# Patient Record
Sex: FEMALE | Race: WHITE | NOT HISPANIC OR LATINO | Employment: UNEMPLOYED | URBAN - METROPOLITAN AREA
[De-identification: names, ages, dates, MRNs, and addresses within clinical notes are randomized per-mention and may not be internally consistent; named-entity substitution may affect disease eponyms.]

---

## 2018-02-04 ENCOUNTER — APPOINTMENT (OUTPATIENT)
Dept: GENERAL RADIOLOGY | Facility: HOSPITAL | Age: 38
End: 2018-02-04

## 2018-02-04 ENCOUNTER — HOSPITAL ENCOUNTER (INPATIENT)
Facility: HOSPITAL | Age: 38
LOS: 2 days | Discharge: HOME | End: 2018-02-06
Attending: EMERGENCY MEDICINE | Admitting: HOSPITALIST

## 2018-02-04 DIAGNOSIS — T40.1X1A ACCIDENTAL OVERDOSE OF HEROIN, INITIAL ENCOUNTER (HCC): ICD-10-CM

## 2018-02-04 DIAGNOSIS — S82.841A BIMALLEOLAR FRACTURE, RIGHT, CLOSED, INITIAL ENCOUNTER: Primary | ICD-10-CM

## 2018-02-04 DIAGNOSIS — Z74.09 IMPAIRED FUNCTIONAL MOBILITY, BALANCE, AND ENDURANCE: ICD-10-CM
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
## 2018-02-04 PROBLEM — G89.11 PAIN, ACUTE DUE TO TRAUMA: Status: ACTIVE | Noted: 2018-02-04

## 2018-02-04 PROBLEM — F19.10 DRUG ABUSE, IV: Status: ACTIVE | Noted: 2018-02-04

## 2018-02-04 LAB
ANION GAP SERPL CALCULATED.3IONS-SCNC: 12.5 MMOL/L
BACTERIA UR QL AUTO: ABNORMAL /HPF
BASOPHILS # BLD AUTO: 0.03 10*3/MM3 (ref 0–0.2)
BASOPHILS NFR BLD AUTO: 0.4 % (ref 0–1.5)
BILIRUB UR QL STRIP: NEGATIVE
BUN BLD-MCNC: 12 MG/DL (ref 6–20)
BUN/CREAT SERPL: 20.3 (ref 7–25)
CALCIUM SPEC-SCNC: 8.3 MG/DL (ref 8.6–10.5)
CHLORIDE SERPL-SCNC: 103 MMOL/L (ref 98–107)
CLARITY UR: CLEAR
CO2 SERPL-SCNC: 25.5 MMOL/L (ref 22–29)
COLOR UR: YELLOW
CREAT BLD-MCNC: 0.59 MG/DL (ref 0.57–1)
DEPRECATED RDW RBC AUTO: 43.6 FL (ref 37–54)
EOSINOPHIL # BLD AUTO: 0.12 10*3/MM3 (ref 0–0.7)
EOSINOPHIL NFR BLD AUTO: 1.7 % (ref 0.3–6.2)
ERYTHROCYTE [DISTWIDTH] IN BLOOD BY AUTOMATED COUNT: 13.2 % (ref 11.7–13)
GFR SERPL CREATININE-BSD FRML MDRD: 115 ML/MIN/1.73
GLUCOSE BLD-MCNC: 129 MG/DL (ref 65–99)
GLUCOSE UR STRIP-MCNC: NEGATIVE MG/DL
HCT VFR BLD AUTO: 38.7 % (ref 35.6–45.5)
HGB BLD-MCNC: 12.7 G/DL (ref 11.9–15.5)
HGB UR QL STRIP.AUTO: ABNORMAL
HYALINE CASTS UR QL AUTO: ABNORMAL /LPF
IMM GRANULOCYTES # BLD: 0 10*3/MM3 (ref 0–0.03)
IMM GRANULOCYTES NFR BLD: 0 % (ref 0–0.5)
KETONES UR QL STRIP: NEGATIVE
LEUKOCYTE ESTERASE UR QL STRIP.AUTO: NEGATIVE
LYMPHOCYTES # BLD AUTO: 2.2 10*3/MM3 (ref 0.9–4.8)
LYMPHOCYTES NFR BLD AUTO: 31.3 % (ref 19.6–45.3)
MCH RBC QN AUTO: 29.3 PG (ref 26.9–32)
MCHC RBC AUTO-ENTMCNC: 32.8 G/DL (ref 32.4–36.3)
MCV RBC AUTO: 89.4 FL (ref 80.5–98.2)
MONOCYTES # BLD AUTO: 0.46 10*3/MM3 (ref 0.2–1.2)
MONOCYTES NFR BLD AUTO: 6.6 % (ref 5–12)
NEUTROPHILS # BLD AUTO: 4.21 10*3/MM3 (ref 1.9–8.1)
NEUTROPHILS NFR BLD AUTO: 60 % (ref 42.7–76)
NITRITE UR QL STRIP: NEGATIVE
PH UR STRIP.AUTO: 6 [PH] (ref 5–8)
PLATELET # BLD AUTO: 243 10*3/MM3 (ref 140–500)
PMV BLD AUTO: 9.9 FL (ref 6–12)
POTASSIUM BLD-SCNC: 3.7 MMOL/L (ref 3.5–5.2)
PROT UR QL STRIP: NEGATIVE
RBC # BLD AUTO: 4.33 10*6/MM3 (ref 3.9–5.2)
RBC # UR: ABNORMAL /HPF
REF LAB TEST METHOD: ABNORMAL
SODIUM BLD-SCNC: 141 MMOL/L (ref 136–145)
SP GR UR STRIP: 1.02 (ref 1–1.03)
SQUAMOUS #/AREA URNS HPF: ABNORMAL /HPF
UROBILINOGEN UR QL STRIP: ABNORMAL
WBC NRBC COR # BLD: 7.02 10*3/MM3 (ref 4.5–10.7)
WBC UR QL AUTO: ABNORMAL /HPF

## 2018-02-04 PROCEDURE — 99285 EMERGENCY DEPT VISIT HI MDM: CPT

## 2018-02-04 PROCEDURE — 73610 X-RAY EXAM OF ANKLE: CPT

## 2018-02-04 PROCEDURE — 25010000002 ONDANSETRON PER 1 MG: Performed by: NURSE PRACTITIONER

## 2018-02-04 PROCEDURE — 71045 X-RAY EXAM CHEST 1 VIEW: CPT

## 2018-02-04 PROCEDURE — 73600 X-RAY EXAM OF ANKLE: CPT

## 2018-02-04 PROCEDURE — 73590 X-RAY EXAM OF LOWER LEG: CPT

## 2018-02-04 PROCEDURE — 81001 URINALYSIS AUTO W/SCOPE: CPT | Performed by: PHYSICIAN ASSISTANT

## 2018-02-04 PROCEDURE — 25010000002 PROPOFOL 10 MG/ML EMULSION: Performed by: NURSE PRACTITIONER

## 2018-02-04 PROCEDURE — 85025 COMPLETE CBC W/AUTO DIFF WBC: CPT | Performed by: PHYSICIAN ASSISTANT

## 2018-02-04 PROCEDURE — 80048 BASIC METABOLIC PNL TOTAL CA: CPT | Performed by: PHYSICIAN ASSISTANT

## 2018-02-04 RX ORDER — SODIUM CHLORIDE 0.9 % (FLUSH) 0.9 %
10 SYRINGE (ML) INJECTION AS NEEDED
Status: DISCONTINUED | OUTPATIENT
Start: 2018-02-04 | End: 2018-02-06

## 2018-02-04 RX ORDER — DIPHENHYDRAMINE HCL 25 MG
25 CAPSULE ORAL NIGHTLY PRN
Status: DISCONTINUED | OUTPATIENT
Start: 2018-02-04 | End: 2018-02-06 | Stop reason: HOSPADM

## 2018-02-04 RX ORDER — KETOROLAC TROMETHAMINE 30 MG/ML
30 INJECTION, SOLUTION INTRAMUSCULAR; INTRAVENOUS EVERY 6 HOURS PRN
Status: DISCONTINUED | OUTPATIENT
Start: 2018-02-04 | End: 2018-02-06 | Stop reason: HOSPADM

## 2018-02-04 RX ORDER — HYDROCODONE BITARTRATE AND ACETAMINOPHEN 10; 325 MG/1; MG/1
1 TABLET ORAL EVERY 4 HOURS
Status: DISCONTINUED | OUTPATIENT
Start: 2018-02-04 | End: 2018-02-04

## 2018-02-04 RX ORDER — HYDROCODONE BITARTRATE AND ACETAMINOPHEN 10; 325 MG/1; MG/1
1 TABLET ORAL EVERY 4 HOURS
Status: DISCONTINUED | OUTPATIENT
Start: 2018-02-04 | End: 2018-02-05

## 2018-02-04 RX ORDER — SODIUM CHLORIDE 0.9 % (FLUSH) 0.9 %
1-10 SYRINGE (ML) INJECTION AS NEEDED
Status: DISCONTINUED | OUTPATIENT
Start: 2018-02-04 | End: 2018-02-06

## 2018-02-04 RX ORDER — PROPOFOL 10 MG/ML
200 VIAL (ML) INTRAVENOUS ONCE
Status: COMPLETED | OUTPATIENT
Start: 2018-02-04 | End: 2018-02-04

## 2018-02-04 RX ORDER — ACETAMINOPHEN 325 MG/1
650 TABLET ORAL EVERY 4 HOURS PRN
Status: DISCONTINUED | OUTPATIENT
Start: 2018-02-04 | End: 2018-02-06 | Stop reason: HOSPADM

## 2018-02-04 RX ORDER — ONDANSETRON 2 MG/ML
4 INJECTION INTRAMUSCULAR; INTRAVENOUS EVERY 6 HOURS PRN
Status: DISCONTINUED | OUTPATIENT
Start: 2018-02-04 | End: 2018-02-06 | Stop reason: HOSPADM

## 2018-02-04 RX ORDER — ONDANSETRON 2 MG/ML
4 INJECTION INTRAMUSCULAR; INTRAVENOUS ONCE
Status: COMPLETED | OUTPATIENT
Start: 2018-02-04 | End: 2018-02-04

## 2018-02-04 RX ADMIN — PROPOFOL 60 MG: 10 INJECTION, EMULSION INTRAVENOUS at 09:45

## 2018-02-04 RX ADMIN — HYDROCODONE BITARTRATE AND ACETAMINOPHEN 1 TABLET: 10; 325 TABLET ORAL at 23:32

## 2018-02-04 RX ADMIN — HYDROCODONE BITARTRATE AND ACETAMINOPHEN 1 TABLET: 10; 325 TABLET ORAL at 15:30

## 2018-02-04 RX ADMIN — HYDROCODONE BITARTRATE AND ACETAMINOPHEN 1 TABLET: 10; 325 TABLET ORAL at 19:16

## 2018-02-04 RX ADMIN — HYDROMORPHONE HYDROCHLORIDE 1 MG: 10 INJECTION INTRAMUSCULAR; INTRAVENOUS; SUBCUTANEOUS at 07:16

## 2018-02-04 RX ADMIN — ONDANSETRON 4 MG: 2 INJECTION INTRAMUSCULAR; INTRAVENOUS at 07:16

## 2018-02-04 NOTE — H&P
"HISTORY AND PHYSICAL   [facility]        Patient Identification:  Name: Beverley Tovar  Age: 37 y.o.  Sex: female  :  1980  MRN: [id number]                     Primary Care Physician: No Known Provider    Chief Complaint:  Right ankle pain status fall post heroin overdose    History of Present Illness:   Mrs Tovar is a 37-year-old female who formally lived in [facility].  She states she started with abuse of prescription pain pills which then led to OxyContin which she claims is \"government heroin\".  She came to [place] for detox in which she states she was sober for 2 years but has been abusing heroin on a daily basis for the last 8 months.  She still states she injects on a daily basis and it's very evident as you can see numerous track marks up and down each upper extremity.  Today she elected to self inject IV heroin and ultimately she gave a strong enough dose that she fell over out of her chair and broke her right ankle.  She was discovered and given Narcan by EMS.  Since coming to the ER she was given IV Dilaudid and right lower extremity was splinted.  Other than her right ankle pain she does not voice any additional complaints and states she had been in her usual health leading up to this day.  She denies any fever chills night sweats chest pain palpitations cough or shortness of breath.  She is already asked to leave the floor with use of a wheelchair and even plans on having additional friends up this evening to watch the Super Bowl.    Past Medical History:  Past Medical History:   Diagnosis Date   • History of alcohol abuse    • History of drug abuse      Past Surgical History:  Past Surgical History:   Procedure Laterality Date   •  SECTION     • EYE SURGERY        Home Meds:  Prescriptions Prior to Admission   Medication Sig Dispense Refill Last Dose   • ibuprofen (ADVIL,MOTRIN) 200 MG tablet Take 200 mg by mouth every 6 (six) hours as needed for mild " "pain (1-3).   2018 at 0300       Allergies:  No Known Allergies  Immunizations:  Immunization History   Administered Date(s) Administered   • Tdap 2016     Social History:   Social History     Social History Narrative     Social History     Social History   • Marital status: Single     Spouse name: N/A   • Number of children: N/A   • Years of education: N/A     Occupational History   • Not on file.     Social History Main Topics   • Smoking status: Heavy Tobacco Smoker     Packs/day: 1.00     Types: Cigarettes   • Smokeless tobacco: Never Used   • Alcohol use No   • Drug use: 7.00 per week     Special: Heroin      Comment: used  yesterday   • Sexual activity: Not Currently     Other Topics Concern   • Not on file     Social History Narrative       Family History:  History reviewed. No pertinent family history.     Review of Systems  See history of present illness and past medical history.  Patient denies any acute changes to vision smell taste or sound headache dizziness.  Denies any focal changes to vision smell taste or sound.  Denies any nausea vomiting chest pain palpitations cough or shortness of breath denies abdominal pain dysuria bruising bleeding or focal loss of function.  Denies any redness or swelling around her injection sites.  Admits to right ankle pain.  Remainder of ROS is negative.    Objective:  tMax 24 hrs: Temp (24hrs), Av.2 °F (36.8 °C), Min:97.7 °F (36.5 °C), Max:98.7 °F (37.1 °C)    Vitals Ranges:   Temp:  [97.7 °F (36.5 °C)-98.7 °F (37.1 °C)] 98.7 °F (37.1 °C)  Heart Rate:  [] 80  Resp:  [14-18] 18  BP: (105-141)/(44-95) 121/80      Exam:  /80 (BP Location: Right arm)  Pulse 80  Temp 98.7 °F (37.1 °C) (Oral)   Resp 18  Ht 165.1 cm (65\")  Wt 113 kg (250 lb)  LMP 2018  SpO2 97%  Breastfeeding? No  BMI 41.6 kg/m2    General Appearance:    Alert, cooperative, no distress, AOx3, pleasant    Head:    Normocephalic, without obvious abnormality, atraumatic "   Eyes:    PERRL, conjunctiva/corneas clear, EOM's intact, both eyes   Ears:    Normal external ear canals, both ears   Nose:   Nares normal, septum midline, mucosa normal, no drainage    or sinus tenderness   Throat:   Lips, mucosa, and tongue normal. Poor dentition    Neck:   Supple, no JVD       Lungs:     Clear to auscultation bilaterally, respirations unlabored        Heart:    Regular rate and rhythm, S1 and S2 normal, no murmur, rub   or gallop   Abdomen:     Soft, non-tender, bowel sounds active all four quadrants,     no masses, no hepatomegaly, no splenomegaly   Extremities:   RLE in ACE/splint - NVM intact in toes, no cyanosis or edema       Skin:   Multiple track marks on both UE w/out cellulitis        Neurologic:   CNII-XII intact, normal strength      .    Data Review:  Labs in chart were reviewed.             Imaging Results (all)     Procedure Component Value Units Date/Time    XR Tibia Fibula 2 View Right [[id number]] Collected:  02/04/18 0842     Updated:  02/04/18 0847    Narrative:       XR ANKLE 3+ VW RIGHT-, XR TIBIA FIBULA 2 VW RIGHT-     INDICATIONS:     Trauma     TECHNIQUE: 3 VIEWS OF THE RIGHT ANKLE, 3 VIEWS OF THE RIGHT LOWER LEG     COMPARISON: None available     FINDINGS:      Angulated fracture of distal fibular metaphysis is seen with 6 mm  posterior displacement of distal fracture fragment. Medial malleolus  fracture shows about 1.4 cm lateral displacement of distal fracture  fragment. Talus is laterally and posteriorly subluxed/partly dislocated,  with surrounding soft tissue swelling. Small calcaneal spurring is  noted.       Impression:          Fracture dislocation of the ankle.     This report was finalized on 2/4/2018 8:44 AM by Dr. Escobar Alaniz MD.       XR Ankle 3+ View Right [[id number]] Collected:  02/04/18 0842     Updated:  02/04/18 0847    Narrative:       XR ANKLE 3+ VW RIGHT-, XR TIBIA FIBULA 2 VW RIGHT-     INDICATIONS:     Trauma     TECHNIQUE: 3 VIEWS OF THE  RIGHT ANKLE, 3 VIEWS OF THE RIGHT LOWER LEG     COMPARISON: None available     FINDINGS:      Angulated fracture of distal fibular metaphysis is seen with 6 mm  posterior displacement of distal fracture fragment. Medial malleolus  fracture shows about 1.4 cm lateral displacement of distal fracture  fragment. Talus is laterally and posteriorly subluxed/partly dislocated,  with surrounding soft tissue swelling. Small calcaneal spurring is  noted.       Impression:          Fracture dislocation of the ankle.     This report was finalized on 2/4/2018 8:44 AM by Dr. Escobar Alaniz MD.       XR Ankle 2 View Right [[id number]] Collected:  02/04/18 1035     Updated:  02/04/18 1041    Narrative:       XR ANKLE 2 VW RIGHT-     INDICATIONS:     Postreduction evaluation     TECHNIQUE: 2 VIEWS OF THE RIGHT ANKLE     COMPARISON: 2/4/2018 at 0816 hours     FINDINGS:      Distal tibia and fibular fractures are redemonstrated with persistent  displacement of fracture fragments, and lateral subluxation of the  talus. Posterior subluxation of the talus is decreased since the prior  exam. Splint material obscures some bony detail.       Impression:          As described.     This report was finalized on 2/4/2018 10:38 AM by Dr. Escobar Alaniz MD.               Assessment:  Principal Problem:    Bimalleolar fracture, right, closed, initial encounter  Active Problems:    Pain, acute due to trauma    Drug abuse, IV    Overdose of heroin      Plan:  Orthopedics to surgically correct fracture   -Will keep in splint for now with plans for the OR in the a.m.   -Postoperative DVT prophylaxis per orthopedics    IV drug abuse with heroin overdose requiring Narcan just earlier in the day   -I normally would give no narcotics to an IV drug abuser but given this acute fracture I feel she requires a certain element of pain management.  At this juncture she will be place on scheduled Norco until nothing by mouth without any when necessary  medications other than Toradol.  Once nothing by mouth will have IV pain medication available on a every 4 hours prn basis and will immediately convert back to oral regimen post operatively.   -Very high concerned for this patient to abuse while in the hospital.  I very clearly sat down with patient with RN and  present to explain terms of this hospitalization.  If this patient chooses to leave the floor, then I asked that she be discharged and further escorted from the hospital immediately.  I've also instructed that I do not want any unsupervised visits due to concerns for abuse in which she could easily use her current IV site.  We will provide some type of bedside sitter if any of her friends are to show up.  Patient has already asked for a wheelchair to leave the floor and this will not be granted.  Patient agrees to the above aspects and rules on this hospitalization and will see how this plays out clinically.    Hua Merida MD  2/4/2018  2:46 PM

## 2018-02-04 NOTE — ED NOTES
EMS reports they were called for OD. Pt also has deformity to her right ankle     Kannan Dos Santos RN  02/04/18 0103

## 2018-02-04 NOTE — ED PROVIDER NOTES
Pt presents complaining of R ankle pain and swelling secondary to a slip and fall onset PTA. She denies LOC secondary to a fall. Pt denies extremity numbness or tingling, SOA, neck pain, back pain, or any other symptoms at this time.    On exam:  Heart: RRR without murmur  Lungs: CTAB without respiratory distress  Musculoskeletal: Obvious deformity of R ankle with tenderness, she has normal DP pulses. Pt's cap refill is nml.    Procedural Sedation  Date/Time: 2/4/2018 9:44 AM  Performed by: AMELIA MOJICA  Authorized by: AMELIA MOJICA     Consent:     Consent obtained:  Verbal    Consent given by:  Patient  Universal protocol:     Procedure explained and questions answered to patient or proxy's satisfaction: yes      Relevant documents present and verified: yes      Test results available and properly labeled: yes      Imaging studies available: yes      Required blood products, implants, devices, and special equipment available: yes      Site/side marked: yes      Patient identity confirmation method:  Verbally with patient  Indications:     Procedure performed:  Dislocation reduction    Procedure necessitating sedation performed by:  Physician performing sedation    Intended level of sedation:  Moderate (conscious sedation)  Pre-sedation assessment:     Time since last food or drink:  PO intake about eight hours ago    ASA classification: class 1 - normal, healthy patient      Neck mobility: normal      Mouth opening:  3 or more finger widths    Mallampati score:  II - soft palate, uvula, fauces visible    Pre-sedation assessments completed and reviewed: airway patency, cardiovascular function, hydration status, mental status, nausea/vomiting, pain level, respiratory function and temperature      History of difficult intubation: no      Pre-sedation assessment completed:  2/4/2018 9:44 AM  Immediate pre-procedure details:     Reassessment: Patient reassessed immediately prior to procedure      Reviewed:  vital signs      Verified: bag valve mask available, emergency equipment available, intubation equipment available, IV patency confirmed, oxygen available, reversal medications available and suction available    Procedure details (see MAR for exact dosages):     Sedation start time:  2/4/2018 9:45 AM    Preoxygenation:  Nasal cannula    Sedation:  Propofol    Intra-procedure monitoring:  Blood pressure monitoring, cardiac monitor, continuous pulse oximetry and frequent vital sign checks    Sedation end time:  2/4/2018 9:51 AM  Post-procedure details:     Post-sedation assessment completed:  2/4/2018 9:54 AM    Attendance: Constant attendance by certified staff until patient recovered      Recovery: Patient returned to pre-procedure baseline      Post-sedation assessments completed and reviewed: airway patency, cardiovascular function, hydration status, mental status, nausea/vomiting, pain level, respiratory function and temperature      Patient is stable for discharge or admission: yes      Patient tolerance:  Tolerated well, no immediate complications            I reviewed pt's imaging results which show R bimalleolar fracture dislocation. Will admit. Pt understands and agrees with the plan. All questions answered.    I supervised care provided by the midlevel provider.    We have discussed this patient's history, physical exam, and treatment plan.   I have reviewed the note and personally saw and examined the patient and agree with the plan of care. Documentation assistance provided by rossana Naidu for Dr. Enriquez.  Information recorded by the scribe was done at my direction and has been verified and validated by me.     Carmel Naidu  02/04/18 121       Darek Enriquez MD  02/04/18 9486

## 2018-02-04 NOTE — CONSULTS
ORTHOPEDIC SURGERY CONSULT      Patient: Beverley Tovar  Date of Admission: 2018  6:44 AM  YOB: 1980  Medical Record Number: [id number]  Attending Physician: Hua Merida MD  Consulting Physician: Julia Polanco PA-C    CHIEF COMPLIANT: Right ankle pain     HISTORY OF PRESENT ILLINESS: Patient is a 37 y.o. year old female presents to [facility] with above complaints.  I was consulted for further evaluation and treatment. Patient presented to the ER after falling in her home. She had immediate onset of right ankle pain. She was diagnosed in the ER with a bimalleolar ankle fracture. She was admitted to the floor. Patient denies any numbess or tingling. She does admit to history of IV drug abuse.     ALLERGIES: No Known Allergies    HOME MEDICATIONS:  Prescriptions Prior to Admission   Medication Sig Dispense Refill Last Dose   • ibuprofen (ADVIL,MOTRIN) 200 MG tablet Take 200 mg by mouth every 6 (six) hours as needed for mild pain (1-3).   2018 at 0300       CURRENT MEDICATIONS:  Scheduled Meds:   Continuous Infusions:   PRN Meds:.•  Insert peripheral IV **AND** sodium chloride    Past Medical History:   Diagnosis Date   • History of alcohol abuse    • History of drug abuse      Past Surgical History:   Procedure Laterality Date   •  SECTION     • EYE SURGERY       Social History     Occupational History   • Not on file.     Social History Main Topics   • Smoking status: Heavy Tobacco Smoker     Packs/day: 1.00     Types: Cigarettes   • Smokeless tobacco: Never Used   • Alcohol use No   • Drug use: 7.00 per week     Special: Heroin      Comment: used  yesterday   • Sexual activity: Not Currently      Social History     Social History Narrative     History reviewed. No pertinent family history.    REVIEW OF SYSTEMS:    HEENT: Patient denies any headaches, vision changes, change in hearing, or tinnitus, Patient denies epistaxis, sinus pain, hoarseness, or  dysphagia   Pulmonary: Patient denies any cough, congestion, acute change in SOA or wheezing.   Cardiovascular: Patient denies any change in chest pain, dyspnea, palpitations, weakness, intolerance of exercise, varicosities, change in murmur   Gastrointestinal:  Patient denies change in appetite, melena, change in bowel habits.   Genital/Urinary: Patient denies dysuria, change in color of urine, change in frequency of urination, pain with urgency, change in incontinence, retention.   Musculoskeletal: Patient denies complaints of acute changes in symptoms of other joints not mentioned above.   Neurological: Patient denies changes in dizziness, tremor, ataxia, or difficulty in speaking or changes in memory.   Endocrine system: Patient denies acute changes in tremors, palpitations, polyuria, polydipsia, polyphagia, diaphoresis, exophthalmos, or goiter.   Psychological: Patient denies thoughts/plans or harming self or other; denies acute changes in depression,  insomnia, night terrors, laverne, disorientation.   Skin: Patient denies any bruising, rashes, discoloration, pruritus,or wounds not mentioned in history of present illness or chief complaint above.   Hematopoietic: Patient denies current bleeding, epistaxis, hematuria, or melena.    PHYSICAL EXAM:   Vitals:  Vitals:    02/04/18 1214 02/04/18 1229 02/04/18 1245 02/04/18 1245   BP:    121/80   BP Location:    Right arm   Pulse: 73 84 81 80   Resp:       Temp:    98.7 °F (37.1 °C)   TempSrc:    Oral   SpO2: 97% 98% 99% 97%   Weight:       Height:           General:  37 y.o. female who appears about stated age.    Alert, cooperative, in no acute distress         Head:    Normocephalic, without obvious abnormality, atraumatic   Eyes:            Lids and lashes normal, conjunctivae and sclerae normal, no         icterus, no pallor, corneas clear, PERRLA   Ears:    Ears appear intact with no abnormalities noted   Throat:   No oral lesions, no thrush, oral mucosa moist    Neck:   No adenopathy, supple, trachea midline, no JVD   Back:     Limited exam shows no severe kyphosis present,no visible           erythema, no excessive  tenderness to palpation.    Lungs:     Respirations regular, even and unlabored.     Heart:    Normal rate, Pulses palpable   Chest Wall:    No abnormalities observed.   Abdomen:     Normal bowel sounds, no masses, no organomegaly, soft              non-tender, non-distended, no guarding, no rebound                      tenderness   Rectal:     Deferred   Pulses:   Pulses palpable and equal bilaterally   Skin:   No bleeding, bruising or rash   Lymph nodes:   No palpable adenopathy   Extremities:     Right ankle is in a well fitting splint. She is NVID. Negative Homans. Skin is intact. She is able to move all toes. ROM not assessed. Knee nontender to palpation. Pulses 2+    DIAGNOSTIC TEST:  No visits with results within 2 Day(s) from this visit.  Latest known visit with results is:    Admission on 05/04/2016, Discharged on 05/04/2016   Component Date Value Ref Range Status   • Hepatitis B Surface Ag 05/04/2016 Non-Reactive  Non-Reactive Final   • Hep A IgM 05/04/2016 Non-Reactive  Non-Reactive Final   • Hep B C IgM 05/04/2016 Non-Reactive  Non-Reactive Final   • Hepatitis C Ab 05/04/2016 Non-Reactive  Non-Reactive Final   • HIV-1/ HIV-2 05/04/2016 Non-Reactive  Non-Reactive Final   • HIV-1 P24 Ag Screen 05/04/2016 Non-Reactive   Final       Xr Tibia Fibula 2 View Right    Result Date: 2/4/2018  Narrative: XR ANKLE 3+ VW RIGHT-, XR TIBIA FIBULA 2 VW RIGHT-  INDICATIONS:     Trauma  TECHNIQUE: 3 VIEWS OF THE RIGHT ANKLE, 3 VIEWS OF THE RIGHT LOWER LEG  COMPARISON: None available  FINDINGS:   Angulated fracture of distal fibular metaphysis is seen with 6 mm posterior displacement of distal fracture fragment. Medial malleolus fracture shows about 1.4 cm lateral displacement of distal fracture fragment. Talus is laterally and posteriorly subluxed/partly  dislocated, with surrounding soft tissue swelling. Small calcaneal spurring is noted.      Impression:  Fracture dislocation of the ankle.  This report was finalized on 2/4/2018 8:44 AM by Dr. Escobar Alaniz MD.      Xr Ankle 2 View Right    Result Date: 2/4/2018  Narrative: XR ANKLE 2 VW RIGHT-  INDICATIONS:     Postreduction evaluation  TECHNIQUE: 2 VIEWS OF THE RIGHT ANKLE  COMPARISON: 2/4/2018 at 0816 hours  FINDINGS:   Distal tibia and fibular fractures are redemonstrated with persistent displacement of fracture fragments, and lateral subluxation of the talus. Posterior subluxation of the talus is decreased since the prior exam. Splint material obscures some bony detail.      Impression:  As described.  This report was finalized on 2/4/2018 10:38 AM by Dr. Escobar Alaniz MD.      Xr Ankle 3+ View Right    Result Date: 2/4/2018  Narrative: XR ANKLE 3+ VW RIGHT-, XR TIBIA FIBULA 2 VW RIGHT-  INDICATIONS:     Trauma  TECHNIQUE: 3 VIEWS OF THE RIGHT ANKLE, 3 VIEWS OF THE RIGHT LOWER LEG  COMPARISON: None available  FINDINGS:   Angulated fracture of distal fibular metaphysis is seen with 6 mm posterior displacement of distal fracture fragment. Medial malleolus fracture shows about 1.4 cm lateral displacement of distal fracture fragment. Talus is laterally and posteriorly subluxed/partly dislocated, with surrounding soft tissue swelling. Small calcaneal spurring is noted.      Impression:  Fracture dislocation of the ankle.  This report was finalized on 2/4/2018 8:44 AM by Dr. Escobar Alaniz MD.          ASSESSMENT:  Bimalleolar ankle fracture  IV Drug user  Patient Active Problem List   Diagnosis   • Bimalleolar fracture, right, closed, initial encounter       PLAN:    I discussed treatment options with the patient. It was discussed that this will need surgical fixation with ORIF of the right fibula and medial malleolus. Dr Jackson was made aware. We will likely move forward with this tomorrow. NPO after  midnight. Continue with pain control.     Risks and benefits of surgery were discussed with the patient in detail.   Risks include infection, blood clot, fracture, need for additional surgery, anesthesia problems, etc.     The above diagnosis and treatment plan was discussed with the patient.  They were educated in treatment options for their condition.   They were given the opportunity to ask questions and were answered to their satisfaction.  They agreed to proceed with the above treatment plan.        Julia Polanco PA-C  Date: 2/4/2018    As Above.      Hira Jackson MD

## 2018-02-04 NOTE — PLAN OF CARE
Problem: Patient Care Overview (Adult)  Goal: Plan of Care Review  Outcome: Ongoing (interventions implemented as appropriate)   02/04/18 1732   Coping/Psychosocial Response Interventions   Plan Of Care Reviewed With patient   Patient Care Overview   Progress no change   Outcome Evaluation   Outcome Summary/Follow up Plan Pt was admitted from ED today with fracture of left ankle. Pt to have surgery in AM. Consent signed. Pt IV drug user and is on strict scheduled pain medication. Pt is to be supervised while visitors are here per MD. Pt c/o mderate pain. VSS. Will continue to monitor.      Goal: Adult Individualization and Mutuality  Outcome: Ongoing (interventions implemented as appropriate)   02/04/18 1732   Individualization   Patient Specific Preferences Pt prefers to be called Concepcion     Goal: Discharge Needs Assessment  Outcome: Ongoing (interventions implemented as appropriate)   02/04/18 1732   Discharge Needs Assessment   Concerns To Be Addressed basic needs concerns;homelessness   Concerns Comments Pt states that she lives in a hotel.        Problem: Pain, Acute (Adult)  Goal: Identify Related Risk Factors and Signs and Symptoms  Outcome: Ongoing (interventions implemented as appropriate)   02/04/18 1732   Pain, Acute   Related Risk Factors (Acute Pain) persistent pain;procedure/treatment;surgery   Signs and Symptoms (Acute Pain) BADLs/IADLs reluctance/inability to perform;verbalization of pain descriptors     Goal: Acceptable Pain Control/Comfort Level  Outcome: Ongoing (interventions implemented as appropriate)   02/04/18 1732   Pain, Acute (Adult)   Acceptable Pain Control/Comfort Level making progress toward outcome       Problem: SUBSTANCE USE/ABUSE  Goal: By day 5 will complete medical detox and be discharged with an appropriate treatment plan in place.  Outcome: Ongoing (interventions implemented as appropriate)    Goal: By discharge, will develop insight into their chemical dependency and sustain  motivation to continue in recovery.  Outcome: Ongoing (interventions implemented as appropriate)    Goal: By discharge, will have in place an ongoing treatment plan in collaboration with assigned CDP.  Outcome: Ongoing (interventions implemented as appropriate)

## 2018-02-04 NOTE — ED PROVIDER NOTES
EMERGENCY DEPARTMENT ENCOUNTER    CHIEF COMPLAINT  Chief Complaint: ingestion  History given by: patient, EMS  History limited by: patient is amnesic of heroin overdose event  Room Number: 06/06  PMD: No Known Provider      HPI:  Pt is a 37 y.o. female who presents with ingestion. She states that she recreationally uses IV heroin daily. Today, at about 0600, after she injected heroin while sitting at her hotel room kitchen table, she lost consciousness. At some point, she fell out of her chair and injured her right ankle. The pain is exacerbated by RLE movement. EMS reports that once they administered 2mg narcan, pt came to and became responsive. Pt is not sure how or exactly when the fall occurred. She reports that she only remembers awaking with EMS surrounding her. She denies headache, neck pain, back pain, abd pain, chest pain, trouble breathing, sensory loss, any other illicit drug use, ETOH use, and sustaining any other injury. EMS splinted pt's right ankle for immobilization. Past Medical History of drug abuse.      Duration: occurred around 0600 today  Timing: constant  Location: generalized  Radiation: none  Quality: none  Intensity/Severity: moderate  Progression: resolved  Associated Symptoms: loss of consciousness, right ankle pain s/p fall  Aggravating Factors: using IV heroin  Alleviating Factors: administration of narcan by EMS  Previous Episodes: Pt states that she recreationally uses IV heroin daily.   Treatment before arrival: EMS reports that once they administered 2mg narcan, pt came to and became responsive. EMS also splinted pt's right ankle for immobilization.     PAST MEDICAL HISTORY  Active Ambulatory Problems     Diagnosis Date Noted   • No Active Ambulatory Problems     Resolved Ambulatory Problems     Diagnosis Date Noted   • No Resolved Ambulatory Problems     Past Medical History:   Diagnosis Date   • History of alcohol abuse    • History of drug abuse        PAST SURGICAL  HISTORY  Past Surgical History:   Procedure Laterality Date   •  SECTION     • EYE SURGERY         FAMILY HISTORY  History reviewed. No pertinent family history.    SOCIAL HISTORY  Social History     Social History   • Marital status: Single     Spouse name: N/A   • Number of children: N/A   • Years of education: N/A     Occupational History   • Not on file.     Social History Main Topics   • Smoking status: Heavy Tobacco Smoker     Packs/day: 1.00     Types: Cigarettes   • Smokeless tobacco: Not on file   • Alcohol use No   • Drug use: Yes      Comment: heroin, sober x 1 year    • Sexual activity: Not on file     Other Topics Concern   • Not on file     Social History Narrative         ALLERGIES  Review of patient's allergies indicates no known allergies.    REVIEW OF SYSTEMS  Review of Systems   Constitutional: Negative for chills and fever.   HENT: Negative for sore throat.    Respiratory: Negative for cough and shortness of breath.    Cardiovascular: Negative for chest pain.   Gastrointestinal: Negative for abdominal pain, diarrhea, nausea and vomiting.   Genitourinary: Negative for difficulty urinating and dysuria.   Musculoskeletal: Negative for back pain and neck pain.        Right ankle pain   Skin: Negative for rash.   Neurological: Positive for syncope (loss of consciousness during heroin ingestion). Negative for dizziness, numbness and headaches.   Psychiatric/Behavioral: The patient is not nervous/anxious.        PHYSICAL EXAM  ED Triage Vitals   Temp Heart Rate Resp BP SpO2   18 0641 18 0641 18 0641 18 0641 18 0641   97.7 °F (36.5 °C) 104 18 141/95 98 % WNL       Physical Exam   Constitutional: She is oriented to person, place, and time and well-developed, well-nourished, and in no distress.   HENT:   Head: Normocephalic and atraumatic.   Mouth/Throat: Mucous membranes are normal.   Eyes: EOM are normal. No scleral icterus.   Neck: Normal range of motion.   No  c-spine tenderness   Cardiovascular: Regular rhythm and normal heart sounds.  Tachycardia present.    Pulses:       Dorsalis pedis pulses are 2+ on the right side, and 2+ on the left side.        Posterior tibial pulses are 2+ on the right side, and 2+ on the left side.   Pulmonary/Chest: Effort normal and breath sounds normal. No respiratory distress.   Abdominal: Soft. There is no tenderness. There is no rebound and no guarding.   Musculoskeletal: She exhibits deformity (deformity to right ankle).   Tenderness to right ankle, no tenderness to right foot or right knee, able to move right toes, pelvis stable, no t-spine or l-spine tenderness, NV intact distally to all extremities    Neurological: She is alert and oriented to person, place, and time. She has normal motor skills and normal sensation.   Sensation intact distally to RLE, follows commands, answers questions appropriately   Skin: Skin is warm and dry.   Track marks and bruising to BUE   Psychiatric: Mood and affect normal.   Nursing note and vitals reviewed.        RADIOLOGY           XR Tibia Fibula 2 View Right (Final result) (pre reduction) Result time: 02/04/18 08:44:31     Final result by Escobar Alaniz MD (02/04/18 08:44:31)     Impression:        Fracture dislocation of the ankle.     This report was finalized on 2/4/2018 8:44 AM by Dr. Escobar Alaniz MD.        Narrative:     XR ANKLE 3+ VW RIGHT-, XR TIBIA FIBULA 2 VW RIGHT-     INDICATIONS:     Trauma     TECHNIQUE: 3 VIEWS OF THE RIGHT ANKLE, 3 VIEWS OF THE RIGHT LOWER LEG     COMPARISON: None available     FINDINGS:      Angulated fracture of distal fibular metaphysis is seen with 6 mm  posterior displacement of distal fracture fragment. Medial malleolus  fracture shows about 1.4 cm lateral displacement of distal fracture  fragment. Talus is laterally and posteriorly subluxed/partly dislocated,  with surrounding soft tissue swelling. Small calcaneal spurring is  noted.            XR  Ankle 2 View Right (Final result) (post reduction) Result time: 02/04/18 10:38:16     Procedure changed from XR Ankle 3+ View Right          Final result by Escobar Alaniz MD (02/04/18 10:38:16)     Impression:        As described.     This report was finalized on 2/4/2018 10:38 AM by Dr. Escobar Alaniz MD.        Narrative:     XR ANKLE 2 VW RIGHT-     INDICATIONS:     Postreduction evaluation     TECHNIQUE: 2 VIEWS OF THE RIGHT ANKLE     COMPARISON: 2/4/2018 at 0816 hours     FINDINGS:      Distal tibia and fibular fractures are redemonstrated with persistent  displacement of fracture fragments, and lateral subluxation of the  talus. Posterior subluxation of the talus is decreased since the prior  exam. Splint material obscures some bony detail.       I ordered the above noted radiological studies and reviewed the images on the PACS system.       PROCEDURES    Lower Extremity Dislocation  Date/Time: 2/4/2018 9:45 AM  Performed by: BEN GUEVARA  Authorized by: AMELIA MOJICA   Consent: Verbal consent obtained. Written consent obtained.  Risks and benefits: risks, benefits and alternatives were discussed  Consent given by: patient  Patient understanding: patient states understanding of the procedure being performed  Patient consent: the patient's understanding of the procedure matches consent given  Imaging studies: imaging studies available  Required items: required blood products, implants, devices, and special equipment available  Patient identity confirmed: verbally with patient and arm band  Injury location: ankle  Location details: right ankle  Injury type: fracture-dislocation  Fracture type: bimalleolar  Pre-procedure neurovascular assessment: neurovascularly intact  Pre-procedure distal perfusion: normal  Pre-procedure neurological function: normal  Pre-procedure range of motion: reduced    Anesthesia:  Local anesthesia used: no    Sedation:  Patient sedated: yes  Sedation type:  moderate (conscious) sedation  Sedatives: propofol and see MAR for details (60mg propofol)  Analgesia: see MAR for details and hydromorphone  Sedation start date/time: 2/4/2018 9:45 AM  Sedation end date/time: 2/4/2018 9:50 AM  Vitals: Vital signs were monitored during sedation.  Manipulation performed: yes  Reduction successful: yes  X-ray confirmed reduction: yes (minimal improvement of alignment on xray)  Immobilization: splint  Splint type: ankle stirrup (posterior)  Supplies used: Ortho-Glass  Post-procedure neurovascular assessment: post-procedure neurovascularly intact  Post-procedure distal perfusion: normal  Post-procedure neurological function: normal  Post-procedure range of motion: improved  Patient tolerance: Patient tolerated the procedure well with no immediate complications  Comments:   9:45 AM- Dr Enriquez administered 60mg propofol for conscious sedation prior to reduction and splint application of right bimalleolar fracture dislocation.      9:46 AM- Reduced and splinted right bimalleolar fracture dislocation.               PROGRESS AND CONSULTS  7:03 AM- Ordered dilaudid and zofran for pain.   7:43 AM- Reviewed pt's history and workup with Dr. Enriquez.  At bedside evaluation, they agree with the plan of care.  8:50 AM- Obtained right ankle xray and right tib/fib xray results-> shows right bimalleolar fracture dislocation.   9:30 AM- Rechecked pt. She is resting comfortably and is in no acute distress. Discussed with pt about right ankle xray and right tib/fib xray findings (right bimalleolar fracture dislocation). Informed pt of plan to reduce and splint her right bimalleolar fracture dislocation under conscious sedation for support and stabilization. Also notified her that we will consult with on call orthopedic surgeon regarding further management. Educated pt that her right bimallolar fracture dislocation could possibly require surgical intervention. Pt verbalizes understanding and agreement.    9:45 AM- Performed reduction and splint application of right bimalleolar fracture dislocation under conscious sedation. See Dr Enriquez's note regarding details of conscious sedation. See above note regarding details of reduction and splint application. Will obtain post reduction right ankle xray.   10:15 AM- Sent call out to on call orthopedic surgeon for consult.   10:35 AM- Discussed case with Dr Jackosn (orthopedic surgeon)  Reviewed history, exam, results and treatments.  Discussed concerns and plan of care. Dr Jackson will consult and would like for pt to be admitted to medicine.  10:37 AM- Sent call out to [facility] for admission.  10:40 AM- Post reduction right ankle xray shows minimal improvement of alignment.   11:09 AM- Discussed case with Dr Merida ([facility] hospitalist)  Reviewed history, exam, results and treatments.  Discussed concerns and plan of care. Dr Merida accepts pt to be admitted to med/surg.  11:11 AM- Rechecked pt. She is resting comfortably and is in no acute distress. Discussed with pt about my conversation with Dr Jackson (orthopedic surgeon) and plan for her to be admitted for further care, possible orthopedic surgical intervention, and observation. Pt verbalizes understanding and agreement with plan.       ADMISSION    Discussed treatment plan and reason for admission with pt/family and admitting physician.  Pt/family voiced understanding of the plan for admission for further testing/treatment as needed.      DIAGNOSIS  Final diagnoses:   Bimalleolar fracture with dislocation, right, closed, initial encounter   Fall, initial encounter   Accidental overdose of heroin, initial encounter           COURSE & MEDICAL DECISION MAKING  Pertinent Imaging studies that were ordered and reviewed are noted above.  Results were reviewed/discussed with the patient and they were also made aware of online assess.   Pt also made aware that some labs, such as cultures, will not be resulted during ER visit and follow  "up with PMD is necessary.     MEDICATIONS GIVEN IN ER  Medications   sodium chloride 0.9 % flush 10 mL (not administered)   HYDROmorphone (DILAUDID) injection 1 mg (1 mg Intravenous Given 2/4/18 0716)   ondansetron (ZOFRAN) injection 4 mg (4 mg Intravenous Given 2/4/18 0716)   Propofol (DIPRIVAN) injection 200 mg (60 mg Intravenous Given 2/4/18 0945)       /44  Pulse 82  Temp 97.7 °F (36.5 °C) (Tympanic)   Resp 18  Ht 165.1 cm (65\")  Wt 113 kg (250 lb)  SpO2 99%  BMI 41.6 kg/m2      I personally reviewed the past medical history, past surgical history, social history, family history, current medications and allergies as they appear in this chart.  The scribe's note accurately reflects the work and decisions made by me.     Documentation assistance provided by rossana Elmore for JC Willis on 2/4/2018 at 11:18 AM. Information recorded by the scribe was done at my direction and has been verified and validated by me.       Franko Elmore  02/04/18 1128       DANDRE Sanchez  02/04/18 1233    Fixed error on ROS.     Macarena Cisse, DANDRE  02/05/18 0736    "

## 2018-02-05 PROCEDURE — 25010000002 KETOROLAC TROMETHAMINE PER 15 MG: Performed by: HOSPITALIST

## 2018-02-05 RX ORDER — BUPROPION HYDROCHLORIDE 150 MG/1
150 TABLET, EXTENDED RELEASE ORAL EVERY 12 HOURS SCHEDULED
Status: DISCONTINUED | OUTPATIENT
Start: 2018-02-05 | End: 2018-02-06 | Stop reason: HOSPADM

## 2018-02-05 RX ORDER — HYDROCODONE BITARTRATE AND ACETAMINOPHEN 10; 325 MG/1; MG/1
1 TABLET ORAL EVERY 4 HOURS PRN
Status: DISCONTINUED | OUTPATIENT
Start: 2018-02-05 | End: 2018-02-06 | Stop reason: HOSPADM

## 2018-02-05 RX ORDER — HYDROMORPHONE HYDROCHLORIDE 1 MG/ML
1 INJECTION, SOLUTION INTRAMUSCULAR; INTRAVENOUS; SUBCUTANEOUS EVERY 4 HOURS PRN
Status: DISCONTINUED | OUTPATIENT
Start: 2018-02-05 | End: 2018-02-06 | Stop reason: HOSPADM

## 2018-02-05 RX ADMIN — HYDROMORPHONE HYDROCHLORIDE 1 MG: 10 INJECTION INTRAMUSCULAR; INTRAVENOUS; SUBCUTANEOUS at 22:45

## 2018-02-05 RX ADMIN — BUPROPION HYDROCHLORIDE 150 MG: 150 TABLET, EXTENDED RELEASE ORAL at 22:44

## 2018-02-05 RX ADMIN — KETOROLAC TROMETHAMINE 30 MG: 30 INJECTION, SOLUTION INTRAMUSCULAR at 19:21

## 2018-02-05 RX ADMIN — HYDROCODONE BITARTRATE AND ACETAMINOPHEN 1 TABLET: 10; 325 TABLET ORAL at 03:35

## 2018-02-05 RX ADMIN — KETOROLAC TROMETHAMINE 30 MG: 30 INJECTION, SOLUTION INTRAMUSCULAR at 08:12

## 2018-02-05 RX ADMIN — HYDROMORPHONE HYDROCHLORIDE 1 MG: 10 INJECTION INTRAMUSCULAR; INTRAVENOUS; SUBCUTANEOUS at 12:09

## 2018-02-05 NOTE — PROGRESS NOTES
DATE OF ADMISSION: 2/4/2018  6:44 AM     LOS: 1 day     Subjective :   C/o. Right ankle pain. Awaiting surgery.    Objective :    Vital signs in last 24 hours:  Vitals:    02/04/18 2030 02/04/18 2330 02/05/18 0300 02/05/18 0731   BP: 133/72 121/67 122/72 114/73   BP Location: Left arm Left arm Left arm Left arm   Patient Position: Lying Lying Lying Lying   Pulse: 90 90 92 87   Resp: 16 16 16 16   Temp: 98.5 °F (36.9 °C) 97.2 °F (36.2 °C) 98 °F (36.7 °C) 98 °F (36.7 °C)   TempSrc: Oral Oral Oral Oral   SpO2: 96% 99% 98% 98%   Weight:       Height:         Body mass index is 41.6 kg/(m^2).    PHYSICAL EXAM:  Patient is calm, in no acute distress, awake and oriented x 3.  Skin on right ankle is clean, dry and intact.  No signs of infection.  Swelling is appropriate in amount.  Homans test is negative.  Patient is neurovascularly intact distally.    LABS:    Results from last 7 days  Lab Units 02/04/18  1614   WBC 10*3/mm3 7.02   HEMOGLOBIN g/dL 12.7   HEMATOCRIT % 38.7   PLATELETS 10*3/mm3 243       Results from last 7 days  Lab Units 02/04/18  1614   SODIUM mmol/L 141   POTASSIUM mmol/L 3.7   CHLORIDE mmol/L 103   CO2 mmol/L 25.5   BUN mg/dL 12   CREATININE mg/dL 0.59   GLUCOSE mg/dL 129*   CALCIUM mg/dL 8.3*           ASSESSMENT:  Right ankle dislocation and fracture     Plan:  Continue pain control. Surgical planning for today or tomorrow am.   Continue splint  Continue SCDs, Continue DVT prophylaxis.  .    Julia Polanco PA-C    Date: 2/5/2018  Time: 8:38 AM    As above.  Surgery scheduled for tomorrow.  NPO after midnight.    Hira Jackson MD

## 2018-02-05 NOTE — PLAN OF CARE
Problem: Orthopaedic Fracture (Adult)  Goal: Signs and Symptoms of Listed Potential Problems Will be Absent or Manageable (Orthopaedic Fracture)  Outcome: Ongoing (interventions implemented as appropriate)      Problem: Patient Care Overview (Adult)  Goal: Plan of Care Review  Outcome: Ongoing (interventions implemented as appropriate)   02/05/18 0253   Coping/Psychosocial Response Interventions   Plan Of Care Reviewed With patient   Patient Care Overview   Progress no change   Outcome Evaluation   Outcome Summary/Follow up Plan patient resting through night, pain controlled at this time, patient educated on medication orders and safety      Goal: Adult Individualization and Mutuality  Outcome: Ongoing (interventions implemented as appropriate)    Goal: Discharge Needs Assessment  Outcome: Ongoing (interventions implemented as appropriate)      Problem: Fall Risk (Adult)  Goal: Identify Related Risk Factors and Signs and Symptoms  Outcome: Outcome(s) achieved Date Met: 02/05/18    Goal: Absence of Falls  Outcome: Ongoing (interventions implemented as appropriate)

## 2018-02-05 NOTE — PAYOR COMM NOTE
"UR CONTACT:  DOT                   P: [phone]  F: [phone]        Beverley Tovar (37 y.o. Female)     Date of Birth Social Security Number Address Home Phone MRN    1980  [place] [address]  [address] [facility] [address] [phone] [id number]    [place] Marital Status          None Single       Admission Date Admission Type Admitting Provider Attending Provider Department, Room/Bed    18 Emergency Hua Merida MD [place], Hua Farley MD [facility] [address] [place], P877/1    Discharge Date Discharge Disposition Discharge Destination                      Attending Provider: Hua Merida MD     Allergies:  No Known Allergies    Isolation:  None   Infection:  None   Code Status:  FULL    Ht:  165.1 cm (65\")   Wt:  113 kg (250 lb)    Admission Cmt:  None   Principal Problem:  Bimalleolar fracture, right, closed, initial encounter [S82.841A]                 Active Insurance as of 2018     Patient has no active insurance coverage on file for 2018.          Emergency Contacts      (Rel.) Home Phone Work Phone Mobile Phone    Tylor De Los Santos (Friend) [phone] -- --               History & Physical      Hua Merida MD at 2018  2:46 PM          HISTORY AND PHYSICAL   [facility]        Patient Identification:  Name: Beverley Tovar  Age: 37 y.o.  Sex: female  :  1980  MRN: [id number]                     Primary Care Physician: No Known Provider    Chief Complaint:  Right ankle pain status fall post heroin overdose    History of Present Illness:   Mrs Tovar is a 37-year-old female who formally lived in [facility].  She states she started with abuse of prescription pain pills which then led to OxyContin which she claims is \"government heroin\".  She came to [place] for detox in which she states she was sober for 2 years but has been abusing heroin on a daily basis for the last 8 months.  " She still states she injects on a daily basis and it's very evident as you can see numerous track marks up and down each upper extremity.  Today she elected to self inject IV heroin and ultimately she gave a strong enough dose that she fell over out of her chair and broke her right ankle.  She was discovered and given Narcan by EMS.  Since coming to the ER she was given IV Dilaudid and right lower extremity was splinted.  Other than her right ankle pain she does not voice any additional complaints and states she had been in her usual health leading up to this day.  She denies any fever chills night sweats chest pain palpitations cough or shortness of breath.  She is already asked to leave the floor with use of a wheelchair and even plans on having additional friends up this evening to watch the Super Bowl.    Past Medical History:  Past Medical History:   Diagnosis Date   • History of alcohol abuse    • History of drug abuse      Past Surgical History:  Past Surgical History:   Procedure Laterality Date   •  SECTION     • EYE SURGERY        Home Meds:  Prescriptions Prior to Admission   Medication Sig Dispense Refill Last Dose   • ibuprofen (ADVIL,MOTRIN) 200 MG tablet Take 200 mg by mouth every 6 (six) hours as needed for mild pain (1-3).   2018 at 0300       Allergies:  No Known Allergies  Immunizations:  Immunization History   Administered Date(s) Administered   • Tdap 2016     Social History:   Social History     Social History Narrative     Social History     Social History   • Marital status: Single     Spouse name: N/A   • Number of children: N/A   • Years of education: N/A     Occupational History   • Not on file.     Social History Main Topics   • Smoking status: Heavy Tobacco Smoker     Packs/day: 1.00     Types: Cigarettes   • Smokeless tobacco: Never Used   • Alcohol use No   • Drug use: 7.00 per week     Special: Heroin      Comment: used  yesterday   • Sexual activity: Not Currently  "    Other Topics Concern   • Not on file     Social History Narrative       Family History:  History reviewed. No pertinent family history.     Review of Systems  See history of present illness and past medical history.  Patient denies any acute changes to vision smell taste or sound headache dizziness.  Denies any focal changes to vision smell taste or sound.  Denies any nausea vomiting chest pain palpitations cough or shortness of breath denies abdominal pain dysuria bruising bleeding or focal loss of function.  Denies any redness or swelling around her injection sites.  Admits to right ankle pain.  Remainder of ROS is negative.    Objective:  tMax 24 hrs: Temp (24hrs), Av.2 °F (36.8 °C), Min:97.7 °F (36.5 °C), Max:98.7 °F (37.1 °C)    Vitals Ranges:   Temp:  [97.7 °F (36.5 °C)-98.7 °F (37.1 °C)] 98.7 °F (37.1 °C)  Heart Rate:  [] 80  Resp:  [14-18] 18  BP: (105-141)/(44-95) 121/80      Exam:  /80 (BP Location: Right arm)  Pulse 80  Temp 98.7 °F (37.1 °C) (Oral)   Resp 18  Ht 165.1 cm (65\")  Wt 113 kg (250 lb)  LMP 2018  SpO2 97%  Breastfeeding? No  BMI 41.6 kg/m2    General Appearance:    Alert, cooperative, no distress, AOx3, pleasant    Head:    Normocephalic, without obvious abnormality, atraumatic   Eyes:    PERRL, conjunctiva/corneas clear, EOM's intact, both eyes   Ears:    Normal external ear canals, both ears   Nose:   Nares normal, septum midline, mucosa normal, no drainage    or sinus tenderness   Throat:   Lips, mucosa, and tongue normal. Poor dentition    Neck:   Supple, no JVD       Lungs:     Clear to auscultation bilaterally, respirations unlabored        Heart:    Regular rate and rhythm, S1 and S2 normal, no murmur, rub   or gallop   Abdomen:     Soft, non-tender, bowel sounds active all four quadrants,     no masses, no hepatomegaly, no splenomegaly   Extremities:   RLE in ACE/splint - NVM intact in toes, no cyanosis or edema       Skin:   Multiple track marks on " both UE w/out cellulitis        Neurologic:   CNII-XII intact, normal strength      .    Data Review:  Labs in chart were reviewed.             Imaging Results (all)     Procedure Component Value Units Date/Time    XR Tibia Fibula 2 View Right [[id number]] Collected:  02/04/18 0842     Updated:  02/04/18 0847    Narrative:       XR ANKLE 3+ VW RIGHT-, XR TIBIA FIBULA 2 VW RIGHT-     INDICATIONS:     Trauma     TECHNIQUE: 3 VIEWS OF THE RIGHT ANKLE, 3 VIEWS OF THE RIGHT LOWER LEG     COMPARISON: None available     FINDINGS:      Angulated fracture of distal fibular metaphysis is seen with 6 mm  posterior displacement of distal fracture fragment. Medial malleolus  fracture shows about 1.4 cm lateral displacement of distal fracture  fragment. Talus is laterally and posteriorly subluxed/partly dislocated,  with surrounding soft tissue swelling. Small calcaneal spurring is  noted.       Impression:          Fracture dislocation of the ankle.     This report was finalized on 2/4/2018 8:44 AM by Dr. Escobar Alaniz MD.       XR Ankle 3+ View Right [[id number]] Collected:  02/04/18 0842     Updated:  02/04/18 0847    Narrative:       XR ANKLE 3+ VW RIGHT-, XR TIBIA FIBULA 2 VW RIGHT-     INDICATIONS:     Trauma     TECHNIQUE: 3 VIEWS OF THE RIGHT ANKLE, 3 VIEWS OF THE RIGHT LOWER LEG     COMPARISON: None available     FINDINGS:      Angulated fracture of distal fibular metaphysis is seen with 6 mm  posterior displacement of distal fracture fragment. Medial malleolus  fracture shows about 1.4 cm lateral displacement of distal fracture  fragment. Talus is laterally and posteriorly subluxed/partly dislocated,  with surrounding soft tissue swelling. Small calcaneal spurring is  noted.       Impression:          Fracture dislocation of the ankle.     This report was finalized on 2/4/2018 8:44 AM by Dr. Escobar Alaniz MD.       XR Ankle 2 View Right [[id number]] Collected:  02/04/18 1035     Updated:  02/04/18 1041     Narrative:       XR ANKLE 2 VW RIGHT-     INDICATIONS:     Postreduction evaluation     TECHNIQUE: 2 VIEWS OF THE RIGHT ANKLE     COMPARISON: 2/4/2018 at 0816 hours     FINDINGS:      Distal tibia and fibular fractures are redemonstrated with persistent  displacement of fracture fragments, and lateral subluxation of the  talus. Posterior subluxation of the talus is decreased since the prior  exam. Splint material obscures some bony detail.       Impression:          As described.     This report was finalized on 2/4/2018 10:38 AM by Dr. Escobar Alaniz MD.               Assessment:  Principal Problem:    Bimalleolar fracture, right, closed, initial encounter  Active Problems:    Pain, acute due to trauma    Drug abuse, IV    Overdose of heroin      Plan:  Orthopedics to surgically correct fracture   -Will keep in splint for now with plans for the OR in the a.m.   -Postoperative DVT prophylaxis per orthopedics    IV drug abuse with heroin overdose requiring Narcan just earlier in the day   -I normally would give no narcotics to an IV drug abuser but given this acute fracture I feel she requires a certain element of pain management.  At this juncture she will be place on scheduled Norco until nothing by mouth without any when necessary medications other than Toradol.  Once nothing by mouth will have IV pain medication available on a every 4 hours prn basis and will immediately convert back to oral regimen post operatively.   -Very high concerned for this patient to abuse while in the hospital.  I very clearly sat down with patient with RN and  present to explain terms of this hospitalization.  If this patient chooses to leave the floor, then I asked that she be discharged and further escorted from the hospital immediately.  I've also instructed that I do not want any unsupervised visits due to concerns for abuse in which she could easily use her current IV site.  We will provide some type of bedside  sitter if any of her friends are to show up.  Patient has already asked for a wheelchair to leave the floor and this will not be granted.  Patient agrees to the above aspects and rules on this hospitalization and will see how this plays out clinically.    Hua Merida MD  2/4/2018  2:46 PM     Electronically signed by Hua Merida MD at 2/4/2018  3:12 PM           Emergency Department Notes      Kannan Dos Santos RN at 2/4/2018  6:39 AM          EMS reports they were called for OD. Pt also has deformity to her right ankle     Kannan Dos Santos RN  02/04/18 0640       Electronically signed by Kannan Dos Santos RN at 2/4/2018  6:40 AM      DANDRE Sanchez at 2/4/2018  6:49 AM      Procedure Orders:    1. Orthopedic Injury Treatment [[id number]] ordered by DANDRE Sanchez at 02/04/18 0946           Attestation signed by Darek Enriquez MD at 2/5/2018  8:08 AM        I supervised care provided by the midlevel provider.    We have discussed this patient's history, physical exam, and treatment plan.   I have reviewed the note and personally saw and examined the patient and agree with the plan of care.  See attached attending note.          For this patient encounter, I reviewed the NP or PA documentation, treatment plan, and medical decision making. Darek Enriquez MD 2/5/2018 8:08 AM                                 EMERGENCY DEPARTMENT ENCOUNTER    CHIEF COMPLAINT  Chief Complaint: ingestion  History given by: patient, EMS  History limited by: patient is amnesic of heroin overdose event  Room Number: 06/06  PMD: No Known Provider      HPI:  Pt is a 37 y.o. female who presents with ingestion. She states that she recreationally uses IV heroin daily. Today, at about 0600, after she injected heroin while sitting at her hotel room kitchen table, she lost consciousness. At some point, she fell out of her chair and injured her right ankle. The pain is  exacerbated by RLE movement. EMS reports that once they administered 2mg narcan, pt came to and became responsive. Pt is not sure how or exactly when the fall occurred. She reports that she only remembers awaking with EMS surrounding her. She denies headache, neck pain, back pain, abd pain, chest pain, trouble breathing, sensory loss, any other illicit drug use, ETOH use, and sustaining any other injury. EMS splinted pt's right ankle for immobilization. Past Medical History of drug abuse.      Duration: occurred around 0600 today  Timing: constant  Location: generalized  Radiation: none  Quality: none  Intensity/Severity: moderate  Progression: resolved  Associated Symptoms: loss of consciousness, right ankle pain s/p fall  Aggravating Factors: using IV heroin  Alleviating Factors: administration of narcan by EMS  Previous Episodes: Pt states that she recreationally uses IV heroin daily.   Treatment before arrival: EMS reports that once they administered 2mg narcan, pt came to and became responsive. EMS also splinted pt's right ankle for immobilization.     PAST MEDICAL HISTORY  Active Ambulatory Problems     Diagnosis Date Noted   • No Active Ambulatory Problems     Resolved Ambulatory Problems     Diagnosis Date Noted   • No Resolved Ambulatory Problems     Past Medical History:   Diagnosis Date   • History of alcohol abuse    • History of drug abuse        PAST SURGICAL HISTORY  Past Surgical History:   Procedure Laterality Date   •  SECTION     • EYE SURGERY         FAMILY HISTORY  History reviewed. No pertinent family history.    SOCIAL HISTORY  Social History     Social History   • Marital status: Single     Spouse name: N/A   • Number of children: N/A   • Years of education: N/A     Occupational History   • Not on file.     Social History Main Topics   • Smoking status: Heavy Tobacco Smoker     Packs/day: 1.00     Types: Cigarettes   • Smokeless tobacco: Not on file   • Alcohol use No   • Drug use:  Yes      Comment: heroin, sober x 1 year    • Sexual activity: Not on file     Other Topics Concern   • Not on file     Social History Narrative         ALLERGIES  Review of patient's allergies indicates no known allergies.    REVIEW OF SYSTEMS  Review of Systems   Constitutional: Negative for chills and fever.   HENT: Negative for sore throat.    Respiratory: Negative for cough and shortness of breath.    Cardiovascular: Negative for chest pain.   Gastrointestinal: Negative for abdominal pain, diarrhea, nausea and vomiting.   Genitourinary: Negative for difficulty urinating and dysuria.   Musculoskeletal: Negative for back pain and neck pain.        Right ankle pain   Skin: Negative for rash.   Neurological: Positive for syncope (loss of consciousness during heroin ingestion). Negative for dizziness, numbness and headaches.   Psychiatric/Behavioral: The patient is not nervous/anxious.        PHYSICAL EXAM  ED Triage Vitals   Temp Heart Rate Resp BP SpO2   02/04/18 0641 02/04/18 0641 02/04/18 0641 02/04/18 0641 02/04/18 0641   97.7 °F (36.5 °C) 104 18 141/95 98 % WNL       Physical Exam   Constitutional: She is oriented to person, place, and time and well-developed, well-nourished, and in no distress.   HENT:   Head: Normocephalic and atraumatic.   Mouth/Throat: Mucous membranes are normal.   Eyes: EOM are normal. No scleral icterus.   Neck: Normal range of motion.   No c-spine tenderness   Cardiovascular: Regular rhythm and normal heart sounds.  Tachycardia present.    Pulses:       Dorsalis pedis pulses are 2+ on the right side, and 2+ on the left side.        Posterior tibial pulses are 2+ on the right side, and 2+ on the left side.   Pulmonary/Chest: Effort normal and breath sounds normal. No respiratory distress.   Abdominal: Soft. There is no tenderness. There is no rebound and no guarding.   Musculoskeletal: She exhibits deformity (deformity to right ankle).   Tenderness to right ankle, no tenderness to right  foot or right knee, able to move right toes, pelvis stable, no t-spine or l-spine tenderness, NV intact distally to all extremities    Neurological: She is alert and oriented to person, place, and time. She has normal motor skills and normal sensation.   Sensation intact distally to RLE, follows commands, answers questions appropriately   Skin: Skin is warm and dry.   Track marks and bruising to BUE   Psychiatric: Mood and affect normal.   Nursing note and vitals reviewed.        RADIOLOGY           XR Tibia Fibula 2 View Right (Final result) (pre reduction) Result time: 02/04/18 08:44:31     Final result by Escobar Alaniz MD (02/04/18 08:44:31)     Impression:        Fracture dislocation of the ankle.     This report was finalized on 2/4/2018 8:44 AM by Dr. Escobar Alaniz MD.        Narrative:     XR ANKLE 3+ VW RIGHT-, XR TIBIA FIBULA 2 VW RIGHT-     INDICATIONS:     Trauma     TECHNIQUE: 3 VIEWS OF THE RIGHT ANKLE, 3 VIEWS OF THE RIGHT LOWER LEG     COMPARISON: None available     FINDINGS:      Angulated fracture of distal fibular metaphysis is seen with 6 mm  posterior displacement of distal fracture fragment. Medial malleolus  fracture shows about 1.4 cm lateral displacement of distal fracture  fragment. Talus is laterally and posteriorly subluxed/partly dislocated,  with surrounding soft tissue swelling. Small calcaneal spurring is  noted.            XR Ankle 2 View Right (Final result) (post reduction) Result time: 02/04/18 10:38:16     Procedure changed from XR Ankle 3+ View Right          Final result by Escobar Alaniz MD (02/04/18 10:38:16)     Impression:        As described.     This report was finalized on 2/4/2018 10:38 AM by Dr. Escobar Alaniz MD.        Narrative:     XR ANKLE 2 VW RIGHT-     INDICATIONS:     Postreduction evaluation     TECHNIQUE: 2 VIEWS OF THE RIGHT ANKLE     COMPARISON: 2/4/2018 at 0816 hours     FINDINGS:      Distal tibia and fibular fractures are redemonstrated  with persistent  displacement of fracture fragments, and lateral subluxation of the  talus. Posterior subluxation of the talus is decreased since the prior  exam. Splint material obscures some bony detail.       I ordered the above noted radiological studies and reviewed the images on the PACS system.       PROCEDURES    Lower Extremity Dislocation  Date/Time: 2/4/2018 9:45 AM  Performed by: BEN GUEVARA  Authorized by: AMELIA MOJICA   Consent: Verbal consent obtained. Written consent obtained.  Risks and benefits: risks, benefits and alternatives were discussed  Consent given by: patient  Patient understanding: patient states understanding of the procedure being performed  Patient consent: the patient's understanding of the procedure matches consent given  Imaging studies: imaging studies available  Required items: required blood products, implants, devices, and special equipment available  Patient identity confirmed: verbally with patient and arm band  Injury location: ankle  Location details: right ankle  Injury type: fracture-dislocation  Fracture type: bimalleolar  Pre-procedure neurovascular assessment: neurovascularly intact  Pre-procedure distal perfusion: normal  Pre-procedure neurological function: normal  Pre-procedure range of motion: reduced    Anesthesia:  Local anesthesia used: no    Sedation:  Patient sedated: yes  Sedation type: moderate (conscious) sedation  Sedatives: propofol and see MAR for details (60mg propofol)  Analgesia: see MAR for details and hydromorphone  Sedation start date/time: 2/4/2018 9:45 AM  Sedation end date/time: 2/4/2018 9:50 AM  Vitals: Vital signs were monitored during sedation.  Manipulation performed: yes  Reduction successful: yes  X-ray confirmed reduction: yes (minimal improvement of alignment on xray)  Immobilization: splint  Splint type: ankle stirrup (posterior)  Supplies used: Ortho-Glass  Post-procedure neurovascular assessment: post-procedure  neurovascularly intact  Post-procedure distal perfusion: normal  Post-procedure neurological function: normal  Post-procedure range of motion: improved  Patient tolerance: Patient tolerated the procedure well with no immediate complications  Comments:   9:45 AM- Dr Enriquez administered 60mg propofol for conscious sedation prior to reduction and splint application of right bimalleolar fracture dislocation.      9:46 AM- Reduced and splinted right bimalleolar fracture dislocation.               PROGRESS AND CONSULTS  7:03 AM- Ordered dilaudid and zofran for pain.   7:43 AM- Reviewed pt's history and workup with Dr. Enriquez.  At bedside evaluation, they agree with the plan of care.  8:50 AM- Obtained right ankle xray and right tib/fib xray results-> shows right bimalleolar fracture dislocation.   9:30 AM- Rechecked pt. She is resting comfortably and is in no acute distress. Discussed with pt about right ankle xray and right tib/fib xray findings (right bimalleolar fracture dislocation). Informed pt of plan to reduce and splint her right bimalleolar fracture dislocation under conscious sedation for support and stabilization. Also notified her that we will consult with on call orthopedic surgeon regarding further management. Educated pt that her right bimallolar fracture dislocation could possibly require surgical intervention. Pt verbalizes understanding and agreement.   9:45 AM- Performed reduction and splint application of right bimalleolar fracture dislocation under conscious sedation. See Dr Enriquez's note regarding details of conscious sedation. See above note regarding details of reduction and splint application. Will obtain post reduction right ankle xray.   10:15 AM- Sent call out to on call orthopedic surgeon for consult.   10:35 AM- Discussed case with Dr Jackson (orthopedic surgeon)  Reviewed history, exam, results and treatments.  Discussed concerns and plan of care. Dr Jackson will consult and would like for  "pt to be admitted to medicine.  10:37 AM- [facility] to [facility] for [facility].  10:40 AM- Post reduction right ankle xray shows minimal improvement of alignment.   11:09 AM- Discussed case with Dr Merida ([facility] hospitalist)  Reviewed history, exam, results and treatments.  Discussed concerns and plan of care. Dr Merida accepts pt to be admitted to med/surg.  11:11 AM- Rechecked pt. She is resting comfortably and is in no acute distress. Discussed with pt about my conversation with Dr Jackson (orthopedic surgeon) and plan for her to be admitted for further care, possible orthopedic surgical intervention, and observation. Pt verbalizes understanding and agreement with plan.       ADMISSION    Discussed treatment plan and reason for admission with pt/family and admitting physician.  Pt/family voiced understanding of the plan for admission for further testing/treatment as needed.      DIAGNOSIS  Final diagnoses:   Bimalleolar fracture with dislocation, right, closed, initial encounter   Fall, initial encounter   Accidental overdose of heroin, initial encounter           COURSE & MEDICAL DECISION MAKING  Pertinent Imaging studies that were ordered and reviewed are noted above.  Results were reviewed/discussed with the patient and they were also made aware of online assess.   Pt also made aware that some labs, such as cultures, will not be resulted during ER visit and follow up with PMD is necessary.     MEDICATIONS GIVEN IN ER  Medications   sodium chloride 0.9 % flush 10 mL (not administered)   HYDROmorphone (DILAUDID) injection 1 mg (1 mg Intravenous Given 2/4/18 0716)   ondansetron (ZOFRAN) injection 4 mg (4 mg Intravenous Given 2/4/18 0716)   Propofol (DIPRIVAN) injection 200 mg (60 mg Intravenous Given 2/4/18 0945)       /44  Pulse 82  Temp 97.7 °F (36.5 °C) (Tympanic)   Resp 18  Ht 165.1 cm (65\")  Wt 113 kg (250 lb)  SpO2 99%  BMI 41.6 kg/m2      I personally reviewed the past medical history, past " surgical history, social history, family history, current medications and allergies as they appear in this chart.  The scribe's note accurately reflects the work and decisions made by me.     Documentation assistance provided by rossana Elmore for JC Willis on 2/4/2018 at 11:18 AM. Information recorded by the scribe was done at my direction and has been verified and validated by me.       Franko Elmore  02/04/18 1128       DANDRE Sanchez  02/04/18 1233    Fixed error on ROS.     Macarena Cisse, DANDRE  02/05/18 0736       Electronically signed by Darek Mojica MD at 2/5/2018  8:08 AM      Darek Mojica MD at 2/4/2018  7:44 AM      Procedure Orders:    1. Procedural Sedation [[id number]] ordered by Darek Mojica MD at 02/04/18 0944                Pt presents complaining of R ankle pain and swelling secondary to a slip and fall onset PTA. She denies LOC secondary to a fall. Pt denies extremity numbness or tingling, SOA, neck pain, back pain, or any other symptoms at this time.    On exam:  Heart: RRR without murmur  Lungs: CTAB without respiratory distress  Musculoskeletal: Obvious deformity of R ankle with tenderness, she has normal DP pulses. Pt's cap refill is nml.    Procedural Sedation  Date/Time: 2/4/2018 9:44 AM  Performed by: DAREK MOJICA  Authorized by: DAREK MOJICA     Consent:     Consent obtained:  Verbal    Consent given by:  Patient  Universal protocol:     Procedure explained and questions answered to patient or proxy's satisfaction: yes      Relevant documents present and verified: yes      Test results available and properly labeled: yes      Imaging studies available: yes      Required blood products, implants, devices, and special equipment available: yes      Site/side marked: yes      Patient identity confirmation method:  Verbally with patient  Indications:     Procedure performed:  Dislocation reduction    Procedure necessitating  sedation performed by:  Physician performing sedation    Intended level of sedation:  Moderate (conscious sedation)  Pre-sedation assessment:     Time since last food or drink:  PO intake about eight hours ago    ASA classification: class 1 - normal, healthy patient      Neck mobility: normal      Mouth opening:  3 or more finger widths    Mallampati score:  II - soft palate, uvula, fauces visible    Pre-sedation assessments completed and reviewed: airway patency, cardiovascular function, hydration status, mental status, nausea/vomiting, pain level, respiratory function and temperature      History of difficult intubation: no      Pre-sedation assessment completed:  2/4/2018 9:44 AM  Immediate pre-procedure details:     Reassessment: Patient reassessed immediately prior to procedure      Reviewed: vital signs      Verified: bag valve mask available, emergency equipment available, intubation equipment available, IV patency confirmed, oxygen available, reversal medications available and suction available    Procedure details (see MAR for exact dosages):     Sedation start time:  2/4/2018 9:45 AM    Preoxygenation:  Nasal cannula    Sedation:  Propofol    Intra-procedure monitoring:  Blood pressure monitoring, cardiac monitor, continuous pulse oximetry and frequent vital sign checks    Sedation end time:  2/4/2018 9:51 AM  Post-procedure details:     Post-sedation assessment completed:  2/4/2018 9:54 AM    Attendance: Constant attendance by certified staff until patient recovered      Recovery: Patient returned to pre-procedure baseline      Post-sedation assessments completed and reviewed: airway patency, cardiovascular function, hydration status, mental status, nausea/vomiting, pain level, respiratory function and temperature      Patient is stable for discharge or admission: yes      Patient tolerance:  Tolerated well, no immediate complications            I reviewed pt's imaging results which show R bimalleolar  fracture dislocation. Will admit. Pt understands and agrees with the plan. All questions answered.    I supervised care provided by the midlevel provider.    We have discussed this patient's history, physical exam, and treatment plan.   I have reviewed the note and personally saw and examined the patient and agree with the plan of care. Documentation assistance provided by rossana Naidu for Dr. Enriquez.  Information recorded by the scribe was done at my direction and has been verified and validated by me.     Carmel Naidu  02/04/18 1216       Darek Enriquez MD  02/04/18 1330       Electronically signed by Darek Enriquez MD at 2/4/2018  1:30 PM        Lines, Drains & Airways    Active LDAs     Name:   Placement date:   Placement time:   Site:   Days:    Peripheral IV Line - Single Lumen 02/04/18 2308 cephalic vein (lateral side of arm), right 18 gauge  02/04/18    2308      less than 1         Inactive LDAs     Name:   Placement date:   Placement time:   Removal date:   Removal time:   Site:   Days:    [REMOVED] Peripheral IV Line - Single Lumen 02/04/18 median cubital vein (antecubital fossa), left 20 gauge  02/04/18 02/04/18    2308      less than 1                Hospital Medications (all)       Dose Frequency Start End    acetaminophen (TYLENOL) tablet 650 mg 650 mg Every 4 Hours PRN 2/4/2018     Sig - Route: Take 2 tablets by mouth Every 4 (Four) Hours As Needed for Mild Pain . - Oral    diphenhydrAMINE (BENADRYL) capsule 25 mg 25 mg Nightly PRN 2/4/2018     Sig - Route: Take 1 capsule by mouth At Night As Needed for Itching. - Oral    HYDROcodone-acetaminophen (NORCO)  MG per tablet 1 tablet 1 tablet Every 4 Hours PRN 2/5/2018 2/15/2018    Sig - Route: Take 1 tablet by mouth Every 4 (Four) Hours As Needed for Moderate Pain . - Oral    HYDROmorphone (DILAUDID) injection 1 mg 1 mg Once 2/4/2018 2/4/2018    Sig - Route: Infuse 1 mL into a venous catheter 1 (One) Time. - Intravenous  "   HYDROmorphone (DILAUDID) injection 1 mg 1 mg Every 4 Hours PRN 2018    Sig - Route: Infuse 1 mL into a venous catheter Every 4 (Four) Hours As Needed for Severe Pain . - Intravenous    ketorolac (TORADOL) injection 30 mg 30 mg Every 6 Hours PRN 2018    Sig - Route: Infuse 30 mg into a venous catheter Every 6 (Six) Hours As Needed for Severe Pain . - Intravenous    ondansetron (ZOFRAN) injection 4 mg 4 mg Once 2018    Sig - Route: Infuse 2 mL into a venous catheter 1 (One) Time. - Intravenous    ondansetron (ZOFRAN) injection 4 mg 4 mg Every 6 Hours PRN 2018     Sig - Route: Infuse 2 mL into a venous catheter Every 6 (Six) Hours As Needed for Nausea or Vomiting. - Intravenous    Propofol (DIPRIVAN) injection 200 mg 200 mg Once 2018    Sig - Route: Infuse 20 mL into a venous catheter 1 (One) Time. - Intravenous    sodium chloride 0.9 % flush 1-10 mL 1-10 mL As Needed 2018     Sig - Route: Infuse 1-10 mL into a venous catheter As Needed for Line Care. - Intravenous    sodium chloride 0.9 % flush 10 mL 10 mL As Needed 2018     Sig - Route: Infuse 10 mL into a venous catheter As Needed for Line Care. - Intravenous    Linked Group 1:  \"And\" Linked Group Details        HYDROcodone-acetaminophen (NORCO)  MG per tablet 1 tablet (Discontinued) 1 tablet Every 4 Hours 2018    Sig - Route: Take 1 tablet by mouth Every 4 (Four) Hours. - Oral    HYDROcodone-acetaminophen (NORCO)  MG per tablet 1 tablet (Discontinued) 1 tablet Every 4 Hours 2018    Sig - Route: Take 1 tablet by mouth Every 4 (Four) Hours. - Oral             Physician Progress Notes       Hua Merida MD at 2018  9:34 AM  Version 2 of 2             DAILY PROGRESS NOTE  [facility]    Patient Identification:  Name: Beverley Tovar  Age: 37 y.o.  Sex: female  :  1980  MRN: [id number]         Primary Care Physician: No Known " "Provider    Subjective:  Interval History: No new issues whether it be medical or social.  Pain is decently controlled.  Denies chest pain shortness of breath or altered mentation.    Objective: No family at bedside    Scheduled Meds:   Continuous Infusions:     Vital signs in last 24 hours:  Temp:  [97.2 °F (36.2 °C)-98.7 °F (37.1 °C)] 98 °F (36.7 °C)  Heart Rate:  [] 87  Resp:  [14-18] 16  BP: (105-141)/(44-86) 114/73    Intake/Output:    Intake/Output Summary (Last 24 hours) at 02/05/18 0934  Last data filed at 02/05/18 0731   Gross per 24 hour   Intake              840 ml   Output              700 ml   Net              140 ml       Exam:  /73 (BP Location: Left arm, Patient Position: Lying)  Pulse 87  Temp 98 °F (36.7 °C) (Oral)   Resp 16  Ht 165.1 cm (65\")  Wt 113 kg (250 lb)  LMP 02/04/2018  SpO2 98%  Breastfeeding? No  BMI 41.6 kg/m2    General Appearance:    Alert, cooperative, no distress, AAOx3, Pain seems controlled                         Throat:   Lips, tongue, gums normal; oral mucosa pink and moist                           Neck:   Supple, symmetrical, trachea midline, no JVD                         Lungs:    Clear to auscultation bilaterally, respirations unlabored                          Heart:    Regular rate and rhythm, S1 and S2 normal                  Abdomen:     Soft, non-tender, bowel sounds active                 Extremities:   Ace/splint to right lower extremity - NVM distally, no cyanosis or edema                             Data Review:  Labs in chart were reviewed.    Assessment:  Active Hospital Problems (** Indicates Principal Problem)    Diagnosis Date Noted   • **Bimalleolar fracture, right, closed, initial encounter [S82.841A] 02/04/2018   • Pain, acute due to trauma [G89.11] 02/04/2018   • Drug abuse, IV [F19.10] 02/04/2018   • Overdose of heroin [T40.1X1A] 02/04/2018      Resolved Hospital Problems    Diagnosis Date Noted Date Resolved   No resolved problems " "to display.       Plan:  OR today - postop DVT prophylaxis per orthopedics and appreciate their assistance    Patient at this juncture is very compliant with pain medication regimen as well as plans to not leave the floor and if visitors come that they are accompanied by staff member    Currently on IV when necessary Dilaudid and postoperatively will transfer back to [place] on a every 4 when necessary basis.  Discussed plan with nurse.    Tobacco abuse - declines NicoNikom    Hua Merida MD  2018  9:34 AM       Electronically signed by Hua Merida MD at 2018  9:39 AM      Hua Merida MD at 2018  9:34 AM  Version 1 of 2             DAILY PROGRESS NOTE  [facility]    Patient Identification:  Name: Beverley Tovar  Age: 37 y.o.  Sex: female  :  1980  MRN: [id number]         Primary Care Physician: No Known Provider    Subjective:  Interval History: No new issues whether it be medical or social.  Pain is decently controlled.  Denies chest pain shortness of breath or altered mentation.    Objective: No family at bedside    Scheduled Meds:   Continuous Infusions:     Vital signs in last 24 hours:  Temp:  [97.2 °F (36.2 °C)-98.7 °F (37.1 °C)] 98 °F (36.7 °C)  Heart Rate:  [] 87  Resp:  [14-18] 16  BP: (105-141)/(44-86) 114/73    Intake/Output:    Intake/Output Summary (Last 24 hours) at 18 0934  Last data filed at 18 0731   Gross per 24 hour   Intake              840 ml   Output              700 ml   Net              140 ml       Exam:  /73 (BP Location: Left arm, Patient Position: Lying)  Pulse 87  Temp 98 °F (36.7 °C) (Oral)   Resp 16  Ht 165.1 cm (65\")  Wt 113 kg (250 lb)  LMP 2018  SpO2 98%  Breastfeeding? No  BMI 41.6 kg/m2    General Appearance:    Alert, cooperative, no distress, AAOx3, Pain seems controlled                         Throat:   Lips, tongue, gums normal; oral mucosa pink and moist                         "   Neck:   Supple, symmetrical, trachea midline, no JVD                         Lungs:    Clear to auscultation bilaterally, respirations unlabored                          Heart:    Regular rate and rhythm, S1 and S2 normal                  Abdomen:     Soft, non-tender, bowel sounds active                 Extremities:   Ace/splint to right lower extremity - NVM distally, no cyanosis or edema                             Data Review:  Labs in chart were reviewed.    Assessment:  Active Hospital Problems (** Indicates Principal Problem)    Diagnosis Date Noted   • **Bimalleolar fracture, right, closed, initial encounter [S82.841A] 02/04/2018   • Pain, acute due to trauma [G89.11] 02/04/2018   • Drug abuse, IV [F19.10] 02/04/2018   • Overdose of heroin [T40.1X1A] 02/04/2018      Resolved Hospital Problems    Diagnosis Date Noted Date Resolved   No resolved problems to display.       Plan:  OR today - postop DVT prophylaxis per orthopedics and appreciate their assistance    Patient at this juncture is very compliant with pain medication regimen as well as plans to not leave the floor and if visitors come that they are accompanied by staff member    Currently on IV when necessary Dilaudid and postoperatively will transfer back to [place] on a every 4 when necessary basis.  Discussed plan with nurse.      Hua Merida MD  2/5/2018  9:34 AM       Electronically signed by Hua Merida MD at 2/5/2018  9:38 AM           Consult Notes       Julia Polanco PA-C at 2/4/2018  2:20 PM  Version 1 of 1     Consult Orders:    1. Ortho (on-call MD unless specified) [[id number]] ordered by DANDRE Sanchez at 02/04/18 1015                      ORTHOPEDIC SURGERY CONSULT      Patient: Beverley Tovar  Date of Admission: 2/4/2018  6:44 AM  YOB: 1980  Medical Record Number: [id number]  Attending Physician: Hua Merida MD  Consulting Physician: Julia Polanco PA-C    CHIEF COMPLIANT: Right  ankle pain     HISTORY OF PRESENT ILLINESS: Patient is a 37 y.o. year old female presents to [facility] with above complaints.  I was consulted for further evaluation and treatment. Patient presented to the ER after falling in her home. She had immediate onset of right ankle pain. She was diagnosed in the ER with a bimalleolar ankle fracture. She was admitted to the floor. Patient denies any numbess or tingling. She does admit to history of IV drug abuse.     ALLERGIES: No Known Allergies    HOME MEDICATIONS:  Prescriptions Prior to Admission   Medication Sig Dispense Refill Last Dose   • ibuprofen (ADVIL,MOTRIN) 200 MG tablet Take 200 mg by mouth every 6 (six) hours as needed for mild pain (1-3).   2018 at 0300       CURRENT MEDICATIONS:  Scheduled Meds:   Continuous Infusions:   PRN Meds:.•  Insert peripheral IV **AND** sodium chloride    Past Medical History:   Diagnosis Date   • History of alcohol abuse    • History of drug abuse      Past Surgical History:   Procedure Laterality Date   •  SECTION     • EYE SURGERY       Social History     Occupational History   • Not on file.     Social History Main Topics   • Smoking status: Heavy Tobacco Smoker     Packs/day: 1.00     Types: Cigarettes   • Smokeless tobacco: Never Used   • Alcohol use No   • Drug use: 7.00 per week     Special: Heroin      Comment: used  yesterday   • Sexual activity: Not Currently      Social History     Social History Narrative     History reviewed. No pertinent family history.    REVIEW OF SYSTEMS:    HEENT: Patient denies any headaches, vision changes, change in hearing, or tinnitus, Patient denies epistaxis, sinus pain, hoarseness, or dysphagia   Pulmonary: Patient denies any cough, congestion, acute change in SOA or wheezing.   Cardiovascular: Patient denies any change in chest pain, dyspnea, palpitations, weakness, intolerance of exercise, varicosities, change in murmur   Gastrointestinal:  Patient denies  change in appetite, melena, change in bowel habits.   Genital/Urinary: Patient denies dysuria, change in color of urine, change in frequency of urination, pain with urgency, change in incontinence, retention.   Musculoskeletal: Patient denies complaints of acute changes in symptoms of other joints not mentioned above.   Neurological: Patient denies changes in dizziness, tremor, ataxia, or difficulty in speaking or changes in memory.   Endocrine system: Patient denies acute changes in tremors, palpitations, polyuria, polydipsia, polyphagia, diaphoresis, exophthalmos, or goiter.   Psychological: Patient denies thoughts/plans or harming self or other; denies acute changes in depression,  insomnia, night terrors, laverne, disorientation.   Skin: Patient denies any bruising, rashes, discoloration, pruritus,or wounds not mentioned in history of present illness or chief complaint above.   Hematopoietic: Patient denies current bleeding, epistaxis, hematuria, or melena.    PHYSICAL EXAM:   Vitals:  Vitals:    02/04/18 1214 02/04/18 1229 02/04/18 1245 02/04/18 1245   BP:    121/80   BP Location:    Right arm   Pulse: 73 84 81 80   Resp:       Temp:    98.7 °F (37.1 °C)   TempSrc:    Oral   SpO2: 97% 98% 99% 97%   Weight:       Height:           General:  37 y.o. female who appears about stated age.    Alert, cooperative, in no acute distress         Head:    Normocephalic, without obvious abnormality, atraumatic   Eyes:            Lids and lashes normal, conjunctivae and sclerae normal, no         icterus, no pallor, corneas clear, PERRLA   Ears:    Ears appear intact with no abnormalities noted   Throat:   No oral lesions, no thrush, oral mucosa moist   Neck:   No adenopathy, supple, trachea midline, no JVD   Back:     Limited exam shows no severe kyphosis present,no visible           erythema, no excessive  tenderness to palpation.    Lungs:     Respirations regular, even and unlabored.     Heart:    Normal rate, Pulses  palpable   Chest Wall:    No abnormalities observed.   Abdomen:     Normal bowel sounds, no masses, no organomegaly, soft              non-tender, non-distended, no guarding, no rebound                      tenderness   Rectal:     Deferred   Pulses:   Pulses palpable and equal bilaterally   Skin:   No bleeding, bruising or rash   Lymph nodes:   No palpable adenopathy   Extremities:     Right ankle is in a well fitting splint. She is NVID. Negative Homans. Skin is intact. She is able to move all toes. ROM not assessed. Knee nontender to palpation. Pulses 2+    DIAGNOSTIC TEST:  No visits with results within 2 Day(s) from this visit.  Latest known visit with results is:    Admission on 05/04/2016, Discharged on 05/04/2016   Component Date Value Ref Range Status   • Hepatitis B Surface Ag 05/04/2016 Non-Reactive  Non-Reactive Final   • Hep A IgM 05/04/2016 Non-Reactive  Non-Reactive Final   • Hep B C IgM 05/04/2016 Non-Reactive  Non-Reactive Final   • Hepatitis C Ab 05/04/2016 Non-Reactive  Non-Reactive Final   • HIV-1/ HIV-2 05/04/2016 Non-Reactive  Non-Reactive Final   • HIV-1 P24 Ag Screen 05/04/2016 Non-Reactive   Final       Xr Tibia Fibula 2 View Right    Result Date: 2/4/2018  Narrative: XR ANKLE 3+ VW RIGHT-, XR TIBIA FIBULA 2 VW RIGHT-  INDICATIONS:     Trauma  TECHNIQUE: 3 VIEWS OF THE RIGHT ANKLE, 3 VIEWS OF THE RIGHT LOWER LEG  COMPARISON: None available  FINDINGS:   Angulated fracture of distal fibular metaphysis is seen with 6 mm posterior displacement of distal fracture fragment. Medial malleolus fracture shows about 1.4 cm lateral displacement of distal fracture fragment. Talus is laterally and posteriorly subluxed/partly dislocated, with surrounding soft tissue swelling. Small calcaneal spurring is noted.      Impression:  Fracture dislocation of the ankle.  This report was finalized on 2/4/2018 8:44 AM by Dr. Escobar Alaniz MD.      Xr Ankle 2 View Right    Result Date: 2/4/2018  Narrative: XR  ANKLE 2 VW RIGHT-  INDICATIONS:     Postreduction evaluation  TECHNIQUE: 2 VIEWS OF THE RIGHT ANKLE  COMPARISON: 2/4/2018 at 0816 hours  FINDINGS:   Distal tibia and fibular fractures are redemonstrated with persistent displacement of fracture fragments, and lateral subluxation of the talus. Posterior subluxation of the talus is decreased since the prior exam. Splint material obscures some bony detail.      Impression:  As described.  This report was finalized on 2/4/2018 10:38 AM by Dr. Escobar Alaniz MD.      Xr Ankle 3+ View Right    Result Date: 2/4/2018  Narrative: XR ANKLE 3+ VW RIGHT-, XR TIBIA FIBULA 2 VW RIGHT-  INDICATIONS:     Trauma  TECHNIQUE: 3 VIEWS OF THE RIGHT ANKLE, 3 VIEWS OF THE RIGHT LOWER LEG  COMPARISON: None available  FINDINGS:   Angulated fracture of distal fibular metaphysis is seen with 6 mm posterior displacement of distal fracture fragment. Medial malleolus fracture shows about 1.4 cm lateral displacement of distal fracture fragment. Talus is laterally and posteriorly subluxed/partly dislocated, with surrounding soft tissue swelling. Small calcaneal spurring is noted.      Impression:  Fracture dislocation of the ankle.  This report was finalized on 2/4/2018 8:44 AM by Dr. Escobar Alaniz MD.          ASSESSMENT:  Bimalleolar ankle fracture  IV Drug user  Patient Active Problem List   Diagnosis   • Bimalleolar fracture, right, closed, initial encounter       PLAN:    I discussed treatment options with the patient. It was discussed that this will need surgical fixation with ORIF of the right fibula and medial malleolus. Dr Jackson was made aware. We will likely move forward with this tomorrow. NPO after midnight. Continue with pain control.     Risks and benefits of surgery were discussed with the patient in detail.   Risks include infection, blood clot, fracture, need for additional surgery, anesthesia problems, etc.     The above diagnosis and treatment plan was discussed with  the patient.  They were educated in treatment options for their condition.   They were given the opportunity to ask questions and were answered to their satisfaction.  They agreed to proceed with the above treatment plan.        Julia Polanco PA-C  Date: 2/4/2018         Electronically signed by Julia Polanco PA-C at 2/4/2018  3:26 PM

## 2018-02-05 NOTE — PROGRESS NOTES
"    DAILY PROGRESS NOTE  [facility]    Patient Identification:  Name: Beverley Tovar  Age: 37 y.o.  Sex: female  :  1980  MRN: [id number]         Primary Care Physician: No Known Provider    Subjective:  Interval History: No new issues whether it be medical or social.  Pain is decently controlled.  Denies chest pain shortness of breath or altered mentation.    Objective: No family at bedside    Scheduled Meds:   Continuous Infusions:     Vital signs in last 24 hours:  Temp:  [97.2 °F (36.2 °C)-98.7 °F (37.1 °C)] 98 °F (36.7 °C)  Heart Rate:  [] 87  Resp:  [14-18] 16  BP: (105-141)/(44-86) 114/73    Intake/Output:    Intake/Output Summary (Last 24 hours) at 18 0934  Last data filed at 18 0731   Gross per 24 hour   Intake              840 ml   Output              700 ml   Net              140 ml       Exam:  /73 (BP Location: Left arm, Patient Position: Lying)  Pulse 87  Temp 98 °F (36.7 °C) (Oral)   Resp 16  Ht 165.1 cm (65\")  Wt 113 kg (250 lb)  LMP 2018  SpO2 98%  Breastfeeding? No  BMI 41.6 kg/m2    General Appearance:    Alert, cooperative, no distress, AAOx3, Pain seems controlled                         Throat:   Lips, tongue, gums normal; oral mucosa pink and moist                           Neck:   Supple, symmetrical, trachea midline, no JVD                         Lungs:    Clear to auscultation bilaterally, respirations unlabored                          Heart:    Regular rate and rhythm, S1 and S2 normal                  Abdomen:     Soft, non-tender, bowel sounds active                 Extremities:   Ace/splint to right lower extremity - NVM distally, no cyanosis or edema                             Data Review:  Labs in chart were reviewed.    Assessment:  Active Hospital Problems (** Indicates Principal Problem)    Diagnosis Date Noted   • **Bimalleolar fracture, right, closed, initial encounter [S82.849G] 2018   • Pain, acute due " to trauma [G89.11] 02/04/2018   • Drug abuse, IV [F19.10] 02/04/2018   • Overdose of heroin [T40.1X1A] 02/04/2018      Resolved Hospital Problems    Diagnosis Date Noted Date Resolved   No resolved problems to display.       Plan:  OR today - postop DVT prophylaxis per orthopedics and appreciate their assistance    Patient at this juncture is very compliant with pain medication regimen as well as plans to not leave the floor and if visitors come that they are accompanied by staff member    Currently on IV when necessary Dilaudid and postoperatively will transfer back to [place] on a every 4 when necessary basis.  Discussed plan with nurse.    Tobacco abuse - declines Lana Merida MD  2/5/2018  9:34 AM

## 2018-02-05 NOTE — PLAN OF CARE
Problem: Orthopaedic Fracture (Adult)  Goal: Signs and Symptoms of Listed Potential Problems Will be Absent or Manageable (Orthopaedic Fracture)  Outcome: Ongoing (interventions implemented as appropriate)   02/05/18 1744   Orthopaedic Fracture   Problems Assessed (Orthopaedic Fracture) all   Problems Present (Orthopaedic Fracture) pain;functional deficit/ self-care deficit       Problem: Patient Care Overview (Adult)  Goal: Plan of Care Review  Outcome: Ongoing (interventions implemented as appropriate)   02/05/18 1744   Coping/Psychosocial Response Interventions   Plan Of Care Reviewed With patient   Patient Care Overview   Progress no change   Outcome Evaluation   Outcome Summary/Follow up Plan Pt treated with IV toradal and one dose of IV dilaudid. Pain controlled. Pt rested. Pt to have surgery tomorrow. NPO after midnight. VSS. Will continue to monitor.      Goal: Adult Individualization and Mutuality  Outcome: Ongoing (interventions implemented as appropriate)    Goal: Discharge Needs Assessment  Outcome: Ongoing (interventions implemented as appropriate)      Problem: SUBSTANCE USE/ABUSE  Goal: By day 5 will complete medical detox and be discharged with an appropriate treatment plan in place.  Outcome: Ongoing (interventions implemented as appropriate)    Goal: By discharge, will develop insight into their chemical dependency and sustain motivation to continue in recovery.  Outcome: Ongoing (interventions implemented as appropriate)    Goal: By discharge, will have in place an ongoing treatment plan in collaboration with assigned CDP.  Outcome: Ongoing (interventions implemented as appropriate)      Problem: Fall Risk (Adult)  Goal: Absence of Falls  Outcome: Ongoing (interventions implemented as appropriate)   02/05/18 1744   Fall Risk (Adult)   Absence of Falls making progress toward outcome

## 2018-02-06 ENCOUNTER — APPOINTMENT (OUTPATIENT)
Dept: GENERAL RADIOLOGY | Facility: HOSPITAL | Age: 38
End: 2018-02-06

## 2018-02-06 ENCOUNTER — ANESTHESIA (OUTPATIENT)
Dept: PERIOP | Facility: HOSPITAL | Age: 38
End: 2018-02-06

## 2018-02-06 ENCOUNTER — ANESTHESIA EVENT (OUTPATIENT)
Dept: PERIOP | Facility: HOSPITAL | Age: 38
End: 2018-02-06

## 2018-02-06 VITALS
OXYGEN SATURATION: 97 % | DIASTOLIC BLOOD PRESSURE: 68 MMHG | HEIGHT: 65 IN | HEART RATE: 96 BPM | TEMPERATURE: 97.4 F | BODY MASS INDEX: 41.65 KG/M2 | WEIGHT: 250 LBS | RESPIRATION RATE: 14 BRPM | SYSTOLIC BLOOD PRESSURE: 118 MMHG

## 2018-02-06 LAB — HCG SERPL QL: NEGATIVE

## 2018-02-06 PROCEDURE — C1713 ANCHOR/SCREW BN/BN,TIS/BN: HCPCS | Performed by: ORTHOPAEDIC SURGERY

## 2018-02-06 PROCEDURE — 25010000002 MIDAZOLAM PER 1 MG: Performed by: ANESTHESIOLOGY

## 2018-02-06 PROCEDURE — 25010000002 HYDROMORPHONE PER 4 MG: Performed by: NURSE ANESTHETIST, CERTIFIED REGISTERED

## 2018-02-06 PROCEDURE — 25010000002 FENTANYL CITRATE (PF) 100 MCG/2ML SOLUTION: Performed by: NURSE ANESTHETIST, CERTIFIED REGISTERED

## 2018-02-06 PROCEDURE — 84703 CHORIONIC GONADOTROPIN ASSAY: CPT | Performed by: ANESTHESIOLOGY

## 2018-02-06 PROCEDURE — 25010000002 NEOSTIGMINE PER 0.5 MG: Performed by: NURSE ANESTHETIST, CERTIFIED REGISTERED

## 2018-02-06 PROCEDURE — 25010000002 DEXAMETHASONE PER 1 MG: Performed by: NURSE ANESTHETIST, CERTIFIED REGISTERED

## 2018-02-06 PROCEDURE — 25010000003 CEFAZOLIN IN DEXTROSE 2-4 GM/100ML-% SOLUTION: Performed by: ORTHOPAEDIC SURGERY

## 2018-02-06 PROCEDURE — 0QSG04Z REPOSITION RIGHT TIBIA WITH INTERNAL FIXATION DEVICE, OPEN APPROACH: ICD-10-PCS | Performed by: ORTHOPAEDIC SURGERY

## 2018-02-06 PROCEDURE — 0QSJ04Z REPOSITION RIGHT FIBULA WITH INTERNAL FIXATION DEVICE, OPEN APPROACH: ICD-10-PCS | Performed by: ORTHOPAEDIC SURGERY

## 2018-02-06 PROCEDURE — 25010000002 ONDANSETRON PER 1 MG: Performed by: NURSE ANESTHETIST, CERTIFIED REGISTERED

## 2018-02-06 PROCEDURE — 25010000002 PROPOFOL 10 MG/ML EMULSION: Performed by: NURSE ANESTHETIST, CERTIFIED REGISTERED

## 2018-02-06 PROCEDURE — 25010000002 FENTANYL CITRATE (PF) 100 MCG/2ML SOLUTION: Performed by: ANESTHESIOLOGY

## 2018-02-06 PROCEDURE — 73610 X-RAY EXAM OF ANKLE: CPT

## 2018-02-06 PROCEDURE — 76000 FLUOROSCOPY <1 HR PHYS/QHP: CPT

## 2018-02-06 PROCEDURE — 25010000002 KETOROLAC TROMETHAMINE PER 15 MG: Performed by: HOSPITALIST

## 2018-02-06 PROCEDURE — 97162 PT EVAL MOD COMPLEX 30 MIN: CPT

## 2018-02-06 DEVICE — SCRW ULS LK 2.7X20MM: Type: IMPLANTABLE DEVICE | Site: ANKLE | Status: FUNCTIONAL

## 2018-02-06 DEVICE — SCRW CANN SD/ST 1/2THRD 4X40MM: Type: IMPLANTABLE DEVICE | Site: ANKLE | Status: FUNCTIONAL

## 2018-02-06 DEVICE — SCRW PERIART LK HD2.7MM 3.5X14MM: Type: IMPLANTABLE DEVICE | Site: ANKLE | Status: FUNCTIONAL

## 2018-02-06 DEVICE — PLT FIB PERIART LK D/L 4H 80 RT: Type: IMPLANTABLE DEVICE | Site: ANKLE | Status: FUNCTIONAL

## 2018-02-06 DEVICE — SCRW PERIART S/TAP HD/2.7MM 3.5X14MM: Type: IMPLANTABLE DEVICE | Site: ANKLE | Status: FUNCTIONAL

## 2018-02-06 DEVICE — SCRW ULS LK 2.7X18MM: Type: IMPLANTABLE DEVICE | Site: ANKLE | Status: FUNCTIONAL

## 2018-02-06 DEVICE — SCRW ULS LK 2.7X16MM: Type: IMPLANTABLE DEVICE | Site: ANKLE | Status: FUNCTIONAL

## 2018-02-06 DEVICE — SCRW PERIART S/TAP HD/2.7MM 3.5X12MM: Type: IMPLANTABLE DEVICE | Site: ANKLE | Status: FUNCTIONAL

## 2018-02-06 DEVICE — SCRW PERIART S/TAP HD/2.7MM 3.5X16MM: Type: IMPLANTABLE DEVICE | Site: ANKLE | Status: FUNCTIONAL

## 2018-02-06 RX ORDER — HYDRALAZINE HYDROCHLORIDE 20 MG/ML
5 INJECTION INTRAMUSCULAR; INTRAVENOUS
Status: DISCONTINUED | OUTPATIENT
Start: 2018-02-06 | End: 2018-02-06 | Stop reason: HOSPADM

## 2018-02-06 RX ORDER — GLYCOPYRROLATE 0.2 MG/ML
INJECTION INTRAMUSCULAR; INTRAVENOUS AS NEEDED
Status: DISCONTINUED | OUTPATIENT
Start: 2018-02-06 | End: 2018-02-06 | Stop reason: SURG

## 2018-02-06 RX ORDER — PROMETHAZINE HYDROCHLORIDE 25 MG/1
25 SUPPOSITORY RECTAL ONCE AS NEEDED
Status: DISCONTINUED | OUTPATIENT
Start: 2018-02-06 | End: 2018-02-06 | Stop reason: HOSPADM

## 2018-02-06 RX ORDER — CEFAZOLIN SODIUM 2 G/100ML
2 INJECTION, SOLUTION INTRAVENOUS ONCE
Status: COMPLETED | OUTPATIENT
Start: 2018-02-06 | End: 2018-02-06

## 2018-02-06 RX ORDER — ONDANSETRON 2 MG/ML
INJECTION INTRAMUSCULAR; INTRAVENOUS AS NEEDED
Status: DISCONTINUED | OUTPATIENT
Start: 2018-02-06 | End: 2018-02-06 | Stop reason: SURG

## 2018-02-06 RX ORDER — EPHEDRINE SULFATE 50 MG/ML
5 INJECTION, SOLUTION INTRAVENOUS ONCE AS NEEDED
Status: DISCONTINUED | OUTPATIENT
Start: 2018-02-06 | End: 2018-02-06 | Stop reason: HOSPADM

## 2018-02-06 RX ORDER — DEXAMETHASONE SODIUM PHOSPHATE 10 MG/ML
INJECTION INTRAMUSCULAR; INTRAVENOUS AS NEEDED
Status: DISCONTINUED | OUTPATIENT
Start: 2018-02-06 | End: 2018-02-06 | Stop reason: SURG

## 2018-02-06 RX ORDER — DIPHENHYDRAMINE HYDROCHLORIDE 50 MG/ML
12.5 INJECTION INTRAMUSCULAR; INTRAVENOUS
Status: DISCONTINUED | OUTPATIENT
Start: 2018-02-06 | End: 2018-02-06 | Stop reason: HOSPADM

## 2018-02-06 RX ORDER — NALOXONE HCL 0.4 MG/ML
0.2 VIAL (ML) INJECTION AS NEEDED
Status: DISCONTINUED | OUTPATIENT
Start: 2018-02-06 | End: 2018-02-06 | Stop reason: HOSPADM

## 2018-02-06 RX ORDER — FENTANYL CITRATE 50 UG/ML
INJECTION, SOLUTION INTRAMUSCULAR; INTRAVENOUS AS NEEDED
Status: DISCONTINUED | OUTPATIENT
Start: 2018-02-06 | End: 2018-02-06 | Stop reason: SURG

## 2018-02-06 RX ORDER — SODIUM CHLORIDE AND POTASSIUM CHLORIDE 150; 900 MG/100ML; MG/100ML
100 INJECTION, SOLUTION INTRAVENOUS CONTINUOUS
Status: DISCONTINUED | OUTPATIENT
Start: 2018-02-06 | End: 2018-02-06 | Stop reason: HOSPADM

## 2018-02-06 RX ORDER — FAMOTIDINE 10 MG/ML
20 INJECTION, SOLUTION INTRAVENOUS ONCE
Status: COMPLETED | OUTPATIENT
Start: 2018-02-06 | End: 2018-02-06

## 2018-02-06 RX ORDER — SODIUM CHLORIDE 0.9 % (FLUSH) 0.9 %
1-10 SYRINGE (ML) INJECTION AS NEEDED
Status: DISCONTINUED | OUTPATIENT
Start: 2018-02-06 | End: 2018-02-06 | Stop reason: HOSPADM

## 2018-02-06 RX ORDER — OXYCODONE AND ACETAMINOPHEN 7.5; 325 MG/1; MG/1
1 TABLET ORAL ONCE AS NEEDED
Status: DISCONTINUED | OUTPATIENT
Start: 2018-02-06 | End: 2018-02-06 | Stop reason: HOSPADM

## 2018-02-06 RX ORDER — HYDROCODONE BITARTRATE AND ACETAMINOPHEN 10; 325 MG/1; MG/1
1-2 TABLET ORAL EVERY 4 HOURS PRN
Qty: 56 TABLET | Refills: 0 | Status: SHIPPED | OUTPATIENT
Start: 2018-02-06 | End: 2018-02-15

## 2018-02-06 RX ORDER — ROCURONIUM BROMIDE 10 MG/ML
INJECTION, SOLUTION INTRAVENOUS AS NEEDED
Status: DISCONTINUED | OUTPATIENT
Start: 2018-02-06 | End: 2018-02-06 | Stop reason: SURG

## 2018-02-06 RX ORDER — MIDAZOLAM HYDROCHLORIDE 1 MG/ML
1 INJECTION INTRAMUSCULAR; INTRAVENOUS
Status: DISCONTINUED | OUTPATIENT
Start: 2018-02-06 | End: 2018-02-06 | Stop reason: HOSPADM

## 2018-02-06 RX ORDER — FENTANYL CITRATE 50 UG/ML
50 INJECTION, SOLUTION INTRAMUSCULAR; INTRAVENOUS
Status: DISCONTINUED | OUTPATIENT
Start: 2018-02-06 | End: 2018-02-06 | Stop reason: HOSPADM

## 2018-02-06 RX ORDER — PROMETHAZINE HYDROCHLORIDE 25 MG/ML
12.5 INJECTION, SOLUTION INTRAMUSCULAR; INTRAVENOUS ONCE AS NEEDED
Status: DISCONTINUED | OUTPATIENT
Start: 2018-02-06 | End: 2018-02-06 | Stop reason: HOSPADM

## 2018-02-06 RX ORDER — CEFAZOLIN SODIUM 2 G/100ML
2 INJECTION, SOLUTION INTRAVENOUS EVERY 8 HOURS
Status: DISCONTINUED | OUTPATIENT
Start: 2018-02-06 | End: 2018-02-06 | Stop reason: HOSPADM

## 2018-02-06 RX ORDER — PROMETHAZINE HYDROCHLORIDE 25 MG/1
12.5 TABLET ORAL ONCE AS NEEDED
Status: DISCONTINUED | OUTPATIENT
Start: 2018-02-06 | End: 2018-02-06 | Stop reason: HOSPADM

## 2018-02-06 RX ORDER — ONDANSETRON 2 MG/ML
4 INJECTION INTRAMUSCULAR; INTRAVENOUS ONCE AS NEEDED
Status: DISCONTINUED | OUTPATIENT
Start: 2018-02-06 | End: 2018-02-06 | Stop reason: HOSPADM

## 2018-02-06 RX ORDER — LIDOCAINE HYDROCHLORIDE 20 MG/ML
INJECTION, SOLUTION INFILTRATION; PERINEURAL AS NEEDED
Status: DISCONTINUED | OUTPATIENT
Start: 2018-02-06 | End: 2018-02-06 | Stop reason: SURG

## 2018-02-06 RX ORDER — HYDROCODONE BITARTRATE AND ACETAMINOPHEN 7.5; 325 MG/1; MG/1
1 TABLET ORAL ONCE AS NEEDED
Status: DISCONTINUED | OUTPATIENT
Start: 2018-02-06 | End: 2018-02-06 | Stop reason: HOSPADM

## 2018-02-06 RX ORDER — LABETALOL HYDROCHLORIDE 5 MG/ML
5 INJECTION, SOLUTION INTRAVENOUS
Status: DISCONTINUED | OUTPATIENT
Start: 2018-02-06 | End: 2018-02-06 | Stop reason: HOSPADM

## 2018-02-06 RX ORDER — MIDAZOLAM HYDROCHLORIDE 1 MG/ML
2 INJECTION INTRAMUSCULAR; INTRAVENOUS
Status: DISCONTINUED | OUTPATIENT
Start: 2018-02-06 | End: 2018-02-06 | Stop reason: HOSPADM

## 2018-02-06 RX ORDER — FLUMAZENIL 0.1 MG/ML
0.2 INJECTION INTRAVENOUS AS NEEDED
Status: DISCONTINUED | OUTPATIENT
Start: 2018-02-06 | End: 2018-02-06 | Stop reason: HOSPADM

## 2018-02-06 RX ORDER — HYDROMORPHONE HCL 110MG/55ML
0.5 PATIENT CONTROLLED ANALGESIA SYRINGE INTRAVENOUS
Status: DISCONTINUED | OUTPATIENT
Start: 2018-02-06 | End: 2018-02-06 | Stop reason: HOSPADM

## 2018-02-06 RX ORDER — SODIUM CHLORIDE, SODIUM LACTATE, POTASSIUM CHLORIDE, CALCIUM CHLORIDE 600; 310; 30; 20 MG/100ML; MG/100ML; MG/100ML; MG/100ML
9 INJECTION, SOLUTION INTRAVENOUS CONTINUOUS
Status: DISCONTINUED | OUTPATIENT
Start: 2018-02-06 | End: 2018-02-06 | Stop reason: HOSPADM

## 2018-02-06 RX ORDER — PROMETHAZINE HYDROCHLORIDE 25 MG/1
25 TABLET ORAL ONCE AS NEEDED
Status: DISCONTINUED | OUTPATIENT
Start: 2018-02-06 | End: 2018-02-06 | Stop reason: HOSPADM

## 2018-02-06 RX ORDER — PROPOFOL 10 MG/ML
VIAL (ML) INTRAVENOUS AS NEEDED
Status: DISCONTINUED | OUTPATIENT
Start: 2018-02-06 | End: 2018-02-06 | Stop reason: SURG

## 2018-02-06 RX ORDER — MAGNESIUM HYDROXIDE 1200 MG/15ML
LIQUID ORAL AS NEEDED
Status: DISCONTINUED | OUTPATIENT
Start: 2018-02-06 | End: 2018-02-06 | Stop reason: HOSPADM

## 2018-02-06 RX ADMIN — LIDOCAINE HYDROCHLORIDE 100 MG: 20 INJECTION, SOLUTION INFILTRATION; PERINEURAL at 07:50

## 2018-02-06 RX ADMIN — GLYCOPYRROLATE 0.2 MG: 0.2 INJECTION INTRAMUSCULAR; INTRAVENOUS at 09:03

## 2018-02-06 RX ADMIN — KETOROLAC TROMETHAMINE 30 MG: 30 INJECTION, SOLUTION INTRAMUSCULAR at 03:28

## 2018-02-06 RX ADMIN — PROPOFOL 100 MG: 10 INJECTION, EMULSION INTRAVENOUS at 09:35

## 2018-02-06 RX ADMIN — MIDAZOLAM 2 MG: 1 INJECTION INTRAMUSCULAR; INTRAVENOUS at 07:03

## 2018-02-06 RX ADMIN — FENTANYL CITRATE 25 MCG: 50 INJECTION INTRAMUSCULAR; INTRAVENOUS at 08:34

## 2018-02-06 RX ADMIN — SODIUM CHLORIDE, POTASSIUM CHLORIDE, SODIUM LACTATE AND CALCIUM CHLORIDE: 600; 310; 30; 20 INJECTION, SOLUTION INTRAVENOUS at 07:04

## 2018-02-06 RX ADMIN — FENTANYL CITRATE 50 MCG: 50 INJECTION INTRAMUSCULAR; INTRAVENOUS at 09:12

## 2018-02-06 RX ADMIN — CEFAZOLIN SODIUM 2 G: 2 INJECTION, SOLUTION INTRAVENOUS at 07:56

## 2018-02-06 RX ADMIN — HYDROMORPHONE HYDROCHLORIDE 0.5 MG: 2 INJECTION, SOLUTION INTRAMUSCULAR; INTRAVENOUS; SUBCUTANEOUS at 10:24

## 2018-02-06 RX ADMIN — SODIUM CHLORIDE, POTASSIUM CHLORIDE, SODIUM LACTATE AND CALCIUM CHLORIDE 9 ML/HR: 600; 310; 30; 20 INJECTION, SOLUTION INTRAVENOUS at 10:32

## 2018-02-06 RX ADMIN — KETOROLAC TROMETHAMINE 30 MG: 30 INJECTION, SOLUTION INTRAMUSCULAR at 15:56

## 2018-02-06 RX ADMIN — PROPOFOL 200 MG: 10 INJECTION, EMULSION INTRAVENOUS at 07:50

## 2018-02-06 RX ADMIN — HYDROCODONE BITARTRATE AND ACETAMINOPHEN 1 TABLET: 10; 325 TABLET ORAL at 15:41

## 2018-02-06 RX ADMIN — FENTANYL CITRATE 50 MCG: 50 INJECTION, SOLUTION INTRAMUSCULAR; INTRAVENOUS at 07:20

## 2018-02-06 RX ADMIN — HYDROCODONE BITARTRATE AND ACETAMINOPHEN 1 TABLET: 10; 325 TABLET ORAL at 11:36

## 2018-02-06 RX ADMIN — NEOSTIGMINE METHYLSULFATE 2 MG: 1 INJECTION INTRAMUSCULAR; INTRAVENOUS; SUBCUTANEOUS at 09:03

## 2018-02-06 RX ADMIN — ONDANSETRON 4 MG: 2 INJECTION INTRAMUSCULAR; INTRAVENOUS at 09:00

## 2018-02-06 RX ADMIN — FENTANYL CITRATE 50 MCG: 50 INJECTION INTRAMUSCULAR; INTRAVENOUS at 07:48

## 2018-02-06 RX ADMIN — DEXAMETHASONE SODIUM PHOSPHATE 8 MG: 10 INJECTION INTRAMUSCULAR; INTRAVENOUS at 08:10

## 2018-02-06 RX ADMIN — FAMOTIDINE 20 MG: 10 INJECTION, SOLUTION INTRAVENOUS at 07:03

## 2018-02-06 RX ADMIN — FENTANYL CITRATE 50 MCG: 50 INJECTION, SOLUTION INTRAMUSCULAR; INTRAVENOUS at 07:35

## 2018-02-06 RX ADMIN — FENTANYL CITRATE 25 MCG: 50 INJECTION INTRAMUSCULAR; INTRAVENOUS at 09:05

## 2018-02-06 RX ADMIN — SODIUM CHLORIDE, POTASSIUM CHLORIDE, SODIUM LACTATE AND CALCIUM CHLORIDE 9 ML/HR: 600; 310; 30; 20 INJECTION, SOLUTION INTRAVENOUS at 07:03

## 2018-02-06 RX ADMIN — CEFAZOLIN SODIUM 2 G: 2 INJECTION, SOLUTION INTRAVENOUS at 15:56

## 2018-02-06 RX ADMIN — FENTANYL CITRATE 50 MCG: 50 INJECTION, SOLUTION INTRAMUSCULAR; INTRAVENOUS at 07:03

## 2018-02-06 RX ADMIN — ROCURONIUM BROMIDE 40 MG: 10 INJECTION INTRAVENOUS at 07:50

## 2018-02-06 RX ADMIN — HYDROMORPHONE HYDROCHLORIDE 1 MG: 10 INJECTION INTRAMUSCULAR; INTRAVENOUS; SUBCUTANEOUS at 05:08

## 2018-02-06 NOTE — PLAN OF CARE
Problem: Patient Care Overview (Adult)  Goal: Plan of Care Review   02/06/18 4474   Coping/Psychosocial Response Interventions   Plan Of Care Reviewed With patient   Outcome Evaluation   Outcome Summary/Follow up Plan Patient is a pleasant 37 y.o. female who sustained a Right Biimalleolar ankle fracture after a fall of unknown cause. s/p R ankle ORIF- NWBing per PT orders. All mobility performed with SV-SBA. Ambulated 25' with crutches- no LOB noted. Discussed stair training verbally. Patient to DC home today with family assist. TRAVIS Guan notified regarding ordering axillary crutches.

## 2018-02-06 NOTE — ANESTHESIA PROCEDURE NOTES
Airway  Urgency: elective    Date/Time: 2/6/2018 7:50 AM  End Time:2/6/2018 7:53 AM  Airway not difficult    General Information and Staff    Patient location during procedure: OR  Anesthesiologist: MELVIN MARIE  CRNA: ABHINAV NIELSEN    Indications and Patient Condition  Indications for airway management: airway protection    Preoxygenated: yes  Mask difficulty assessment: 1 - vent by mask    Final Airway Details  Final airway type: endotracheal airway      Successful airway: ETT  Cuffed: yes   Successful intubation technique: direct laryngoscopy  Facilitating devices/methods: intubating stylet  Endotracheal tube insertion site: oral  Blade: Hector  Blade size: #3  ETT size: 7.0 mm  Cormack-Lehane Classification: grade I - full view of glottis  Placement verified by: chest auscultation and capnometry   Cuff volume (mL): 7  Measured from: lips  ETT to lips (cm): 22  Number of attempts at approach: 1    Additional Comments  Preoxygenated with 100% FiO2. Smooth IV induction. Atraumatic insertion. No change to dentition or soft tissue.

## 2018-02-06 NOTE — PLAN OF CARE
Problem: Patient Care Overview (Adult)  Goal: Plan of Care Review  Outcome: Ongoing (interventions implemented as appropriate)   02/06/18 1043 02/06/18 1400   Coping/Psychosocial Response Interventions   Plan Of Care Reviewed With patient --    Patient Care Overview   Progress --  progress towards functional goals is fair   Outcome Evaluation   Outcome Summary/Follow up Plan --  Pt s/p right ankle ORIF. Surgical block is intact, RLE from knee down is numb, pt is unable to move extremity, pulses 2+, toes are warm. The patient complains of sharp pain on medial aspect of right ankle. Pain controlled with PO meds. Pt is tachycardic, other vials are WNL. Dressing is CDI. Ambulated with staff. Possible d/c tomorrow. Will continue to monitor for s/s of withdrawal d/t history of heroin and alcohol abuse.     Goal: Adult Individualization and Mutuality  Outcome: Ongoing (interventions implemented as appropriate)    Goal: Discharge Needs Assessment  Outcome: Ongoing (interventions implemented as appropriate)      Problem: SUBSTANCE USE/ABUSE  Goal: By day 5 will complete medical detox and be discharged with an appropriate treatment plan in place.  Outcome: Ongoing (interventions implemented as appropriate)    Goal: By discharge, will develop insight into their chemical dependency and sustain motivation to continue in recovery.  Outcome: Ongoing (interventions implemented as appropriate)    Goal: By discharge, will have in place an ongoing treatment plan in collaboration with assigned CDP.  Outcome: Ongoing (interventions implemented as appropriate)      Problem: Fall Risk (Adult)  Goal: Absence of Falls  Outcome: Ongoing (interventions implemented as appropriate)      Problem: Perioperative Period (Adult)  Goal: Signs and Symptoms of Listed Potential Problems Will be Absent or Manageable (Perioperative Period)  Outcome: Ongoing (interventions implemented as appropriate)

## 2018-02-06 NOTE — PLAN OF CARE
Problem: Orthopaedic Fracture (Adult)  Goal: Signs and Symptoms of Listed Potential Problems Will be Absent or Manageable (Orthopaedic Fracture)  Outcome: Ongoing (interventions implemented as appropriate)      Problem: Patient Care Overview (Adult)  Goal: Plan of Care Review  Outcome: Ongoing (interventions implemented as appropriate)   02/06/18 0212   Outcome Evaluation   Outcome Summary/Follow up Plan pain controlled during shift with IV pain meds; splint and ace wrap on ankle- elevated; vss; no signs of withdrawals; NPO after midnight; surgery planned for morning     Goal: Adult Individualization and Mutuality  Outcome: Ongoing (interventions implemented as appropriate)      Problem: SUBSTANCE USE/ABUSE  Goal: By day 5 will complete medical detox and be discharged with an appropriate treatment plan in place.  Outcome: Ongoing (interventions implemented as appropriate)    Goal: By discharge, will develop insight into their chemical dependency and sustain motivation to continue in recovery.  Outcome: Ongoing (interventions implemented as appropriate)    Goal: By discharge, will have in place an ongoing treatment plan in collaboration with assigned CDP.  Outcome: Ongoing (interventions implemented as appropriate)      Problem: Fall Risk (Adult)  Goal: Absence of Falls  Outcome: Ongoing (interventions implemented as appropriate)

## 2018-02-06 NOTE — DISCHARGE SUMMARY
Discharge Summary    Date of Admission: 2/4/2018  6:44 AM  Date of Discharge:  2/6/2018    Discharge Diagnosis:   Accidental overdose of heroin, initial encounter [T40.1X1A]  Fall, initial encounter [W19.XXXA]  Bimalleolar fracture, right, closed, initial encounter [S82.841A]      PMHX:   Past Medical History:   Diagnosis Date   • History of alcohol abuse    • History of drug abuse        Discharge Disposition  Home or Self Care    Procedures Performed  Procedure(s):  RIGHT ANKLE OPEN REDUCTION INTERNAL FIXATION       Indication for Admission  Patient is a 37 y.o. female admitted after undergoing the above surgical procedure. They were admitted for post-operative pain control, medical management and physical therapy.  They progressed with physical therapy.    They were deemed stable for discharge.      Consults:   Consults     Date and Time Order Name Status Description    2/4/2018 1455 Inpatient Consult to Orthopedic Surgery      2/4/2018 1037 [facility] (on-call MD unless specified) Completed     2/4/2018 1015 Ortho (on-call MD unless specified) Completed           Discharge Instructions:  Patient is non-weight bearing as tolerated on the operative leg.  Patient is to progress range of motion and ambulation as tolerated.  Use walker/crutches as needed for stability and gait.  Keep cast clean and dry.  Patient will follow-up in the office in 2 weeks.  Call the office at [phone] for any questions or concerns.      Discharge Medications   Beverley Tovar   Home Medication Instructions ROSARIO:[id number]    Printed on:02/06/18 5678   Medication Information                      HYDROcodone-acetaminophen (NORCO)  MG per tablet  Take 1-2 tablets by mouth Every 4 (Four) Hours As Needed for Moderate Pain  for up to 9 days.             ibuprofen (ADVIL,MOTRIN) 200 MG tablet  Take 200 mg by mouth every 6 (six) hours as needed for mild pain (1-3).                 Discharge Diet:   Diet Instructions     Diet:  Regular       Discharge Diet:  Regular                 Activity at Discharge:   Activity Instructions     Discharge Activity Restrictions       Keep cast clean and dry.  Elevate leg.  Keep weight off leg.                 Follow-up Appointments  No future appointments.  Additional Instructions for the Follow-ups that You Need to Schedule     Discharge Follow-up with Specified Provider: Dr. Jackson.  [phone]; 2 Weeks    As directed    To:  Dr. Jackson.  [phone]    Follow Up:  2 Weeks                     Test Results Pending at Discharge  none     Hira Jackson MD  02/06/18,  9:52 AM

## 2018-02-06 NOTE — PROGRESS NOTES
Discharge Planning Assessment  [facility]     Patient Name: Beverley Tovar  MRN: [id number]  Today's Date: 2/6/2018    Admit Date: 2/4/2018          Discharge Needs Assessment       02/06/18 1229    Living Environment    Lives With friend(s)    Living Arrangements hotel/motel    Transportation Available car;family or friend will provide    Discharge Needs Assessment    Concerns To Be Addressed basic needs concerns    Current Discharge Risk substance abuse            Discharge Plan       02/06/18 1231    Case Management/Social Work Plan    [facility]    Additional Comments Spoke with pt for screening o DCP/needs.  Pt statedt hat her  plan will be to D/c to her Dad's ( Armen Tovar) home [address] uop[address] D/C.   Pt reports not have any DME or using HH in the past.  Pt did state that her dad will be able to bring her back for any  MD appts upon D/C.  Also encouraged pt to get a PCP.   [facility] was able to get pt signed up for [place] medicaid.    Discussed with pt also about her   Heroine  abuse.  pt stated that she has had  2 yr soberity in the passed but has relasped for the last 8 months.   Pt stated that she completed the [facility] program in the past and has also completed thier refocus  program as well.  Pt stated that she prefers to go to  support groups such as NA.   Pt reorts that her family does not know she has had a relasp and does not plan to tell them.  pt stated that she will plan to atten a NA support group once she can in [place].   Pt anticipating that she will D/C 2/7 and will have her dad on stand by to come get her since it is over 2 hrs away.   CCP will follow jaci  assist if any additonal D/c needs arise.          Discharge Placement     No information found        Expected Discharge Date and Time     Expected Discharge Date Expected Discharge Time    Feb 6, 2018               Demographic Summary       02/06/18 1228    Referral Information     Admission Type inpatient    Arrived From home or self-care    Referral Source admission list    Reason For Consult discharge planning;substance use concerns            Functional Status       02/06/18 1229    Functional Status Current    Ambulation 3-->assistive equipment and person    Transferring 3-->assistive equipment and person    Toileting 3-->assistive equipment and person    Bathing 2-->assistive person    Dressing 2-->assistive person    Eating 0-->independent    Communication 0-->understands/communicates without difficulty    Swallowing (if score 2 or more for any item, consult Rehab Services) 0-->swallows foods/liquids without difficulty    Functional Status Prior    Ambulation 0-->independent    Transferring 0-->independent    Toileting 0-->independent    Bathing 0-->independent    Dressing 0-->independent    Eating 0-->independent    Communication 0-->understands/communicates without difficulty    Swallowing 0-->swallows foods/liquids without difficulty    IADL    Medications independent    Meal Preparation independent    Housekeeping independent    Laundry independent    Shopping independent    Oral Care independent            Psychosocial     None            Abuse/Neglect     None            Legal     None            Substance Abuse     None            Patient Forms     None          IRENE Quinteros

## 2018-02-06 NOTE — OP NOTE
ANKLE OPEN REDUCTION INTERNAL FIXATION  Procedure Note    Beverley Tovar  2/4/2018 - 2/6/2018    Pre-op Diagnosis: Right  Biimalleolar ankle fracture  Post-op Diagnosis:Same  Procedure:  Open Reduction Internal Fixation of Right trimalleolar ankle fracture.  Surgeon:  Hira Jackson MD  Anesthesia: General, Anesthesiologist: Rhonda Ledesma MD  CRNA: Selam Flowers CRNA  Staff: Circulator: Magdalena Restrepo RN  Radiology Technologist: Debra Knapp  Scrub Person: Phil Celestin  Vendor Representative: Saúl Wang CFA  Estimated Blood Loss: 50 ml  Specimens:   Order Name Source Comment Collection Info Order Time   HCG, SERUM, QUALITATIVE Arm, Left  Collected By: Laly Yoo RN 2/6/2018  6:25 AM     Drains: none  Complications: None  Implants used:    Implant Name Type Inv. Item Serial No.  Lot No. LRB No. Used   PLT FIB PERIART LK D/L 4H 80 RT - [id number] Implant PLT FIB PERIART LK D/L 4H 80 RT  [employer]  Right 1   SCRW ULS LK 2.7X16MM - [id number] Implant SCRW ULS LK 2.7X16MM  [employer]  Right 1   SCRW ULS LK 2.7X18MM - [id number] Implant SCRW ULS LK 2.7X18MM  [employer]  Right 2   SCRW ULS LK 2.7X20MM - [id number] Implant SCRW ULS LK 2.7X20MM  [employer]  Right 2   SCRW PERIART S/TAP HD/2.7MM 3.5X14MM - [id number] Implant SCRW PERIART S/TAP HD/2.7MM 3.5X14MM  [employer]  Right 1   SCRW PERIART S/TAP HD/2.7MM 3.5X16MM - [id number] Implant SCRW PERIART S/TAP HD/2.7MM 3.5X16MM  [employer]  Right 1   SCRW PERIART S/TAP HD/2.7MM 3.5X12MM - [id number] Implant SCRW PERIART S/TAP HD/2.7MM 3.5X12MM  [employer]  Right 1   SCRW PERIART LK HD2.7MM 3.5X14MM - [id number] Implant SCRW PERIART LK HD2.7MM 3.5X14MM  [employer]  Right 1   SCRW LETHA SD/ST 1/2THRD 4X40MM - [id number] Implant SCRW LETHA SD/ST 1/2THRD 4X40MM   [employer]   Right 2       Indication for Procedure:   The patient is a 37 y.o. female  who sustained a ankle fracture after she passed out from heroin use.  EMS  was called and gave her narcan and resuscitated her. The fracture was significantly displaced.  Surgical options and non-surgical options were discussed in detail and to the patients satisfaction.  Surgical intervention was recommended based on the patients injury and functional status.     The patient was educated in risks of surgery that could include possible risk of fracture malunion, non-union, painful hardware,  infection, deep venous thrombosis, pulmonary embolism, fracture, neurovascular injury, dislocation, possible persistent pain, need for additional surgeries, anesthetic risks, medical risks including heart attack and stroke, and death.  The patient had the opportunity to ask questions, and concerns about the proposed surgery.  The patient also understood that medicine is not an exact science, and that outcomes of the surgical procedure may be less than desired. The patient wished to proceed.      Protocols for intravenous antibiotics and venous thrombosis were followed for this patient.  IV antibiotics were infused prior to surgery and will be discontinued within 24 hours of completion of the surgical procedure.       DESCRIPTION OF PROCEDURE: After the patient was identified in the preoperative holding area, the operative site was marked and confirmed. The patient was brought to the operating room on a stretcher and placed supine on the operating table. The above anesthetic was placed uneventfully. The patient's operative leg then had a non-sterile tourniquet placed and was then prepped and drapped in usual sterile fashion.  Time-out procedure was performed and IV antibiotics were infused.  An esmarch was used and the touniquet was inflated.  Then a lateral approach to the distal fibula was used with a 10 blade scalpel.  The periosteum was elevated off the fracture and the hematoma was removed.  The fracture was reduced using a lobster bone reduction forceps.  C-arm visualization was used to confirm  fracture reduction.   Then the Delgado distal fibular locking plate was applied, and with C-arm guidance, screws were filled in standard AO technique.     Then a medial curvilinear approach was used for the medial malleolar fracture.  The hematoma was removed.  Then the fracture was reduced using point to point bone reduction forceps.  C-arm was used to confirm anatomic fracture alignment.  Then 2 k-wires were placed distal to proximal crossing the medial malleolar fragment.  Then two 4-0 cannulated screws were placed over these wires up into the tibia proximally.  This held the fracture together nicely.  The posterior tibia was evaluated and it was deemed that it did not need screw fixation.  Final C-arm images were obtained which showed good fracture reduction and hardware placement.  The tourniquet was dropped and the wounds were closed with 0-vicryl to close the fascia, 2-0 vicryl to close the deep dermis, and 3-0 nylon to close the skin.  Sterile dressings were applied and a fiberglass cast that was split to allow for swelling was applied.      The patient was then awakened from anesthesia and returned to recovery room in stable condition. No intraoperative complications.  Sponge and needle counts were correct at the conclusion of the case.      Hira Jackson MD

## 2018-02-06 NOTE — THERAPY EVALUATION
Acute Care - Physical Therapy Initial Evaluation/Discharge  [facility]     Patient Name: Beverley Tovar  : 1980  MRN: [id number]  Today's Date: 2018   Onset of Illness/Injury or Date of Surgery Date: 18 (s/p R ankle ORIF)  Date of Referral to PT: 18  Referring Physician: Renetta      Admit Date: 2018     Visit Dx:    ICD-10-CM ICD-9-CM   1. Bimalleolar fracture with dislocation, right, closed, initial encounter S82.841A 824.4   2. Fall, initial encounter W19.XXXA E888.9   3. Accidental overdose of heroin, initial encounter T40.1X1A 965.01     E850.0   4. Impaired functional mobility, balance, and endurance Z74.09 V49.89     Patient Active Problem List   Diagnosis   • Bimalleolar fracture, right, closed, initial encounter   • Pain, acute due to trauma   • Drug abuse, IV   • Overdose of heroin     Past Medical History:   Diagnosis Date   • History of alcohol abuse    • History of drug abuse      Past Surgical History:   Procedure Laterality Date   •  SECTION     • EYE SURGERY            PT ASSESSMENT (last 72 hours)      PT Evaluation       18 1600 18 1229    Rehab Evaluation    Document Type evaluation  -MA     Subjective Information agree to therapy;complains of;pain  -MA     Patient Effort, Rehab Treatment good  -MA     Symptoms Noted During/After Treatment none  -MA     General Information    Patient Profile Review yes  -MA     Onset of Illness/Injury or Date of Surgery Date 18   s/p R ankle ORIF  -MA     Referring Physician Renetta  -MA     General Observations supine in bed with NATALIE elevated, RN at bedside, R DIXIE elevated  -MA     Pertinent History Of Current Problem Lost consciousness after injecting heroin and sustained a fall-unsure of mechanism and only remembers EMS surrounding her. Sustained a Right  Bimalleolar ankle fracture. s/p R ankle ORIF  -MA     Precautions/Limitations fall precautions  -MA     Prior Level of Function independent:;all  household mobility  -MA     Equipment Currently Used at Home none  -MA     Plans/Goals Discussed With patient  -MA     Risks Reviewed patient:  -MA     Benefits Reviewed patient:  -MA     Barriers to Rehab none identified  -MA     Living Environment    Lives With  friend(s)  -MS    Living Arrangements  hotel/motel  -MS    Transportation Available  car;family or friend will provide  -MS    Clinical Impression    Date of Referral to PT 02/06/18  -MA     PT Diagnosis impaired funct mobility 2' to post op and NWBing of R LE  -MA     Criteria for Skilled Therapeutic Interventions Met no problems identified which require skilled intervention  -MA     Pathology/Pathophysiology Noted (Describe Specifically for Each System) musculoskeletal  -MA     Vital Signs    O2 Delivery Pre Treatment room air  -MA     Pain Assessment    Pain Assessment No/denies pain  -MA     Vision Assessment/Intervention    Visual Impairment WFL with corrective lenses  -MA     Cognitive Assessment/Intervention    Current Cognitive/Communication Assessment functional  -MA     Orientation Status oriented x 4  -MA     Follows Commands/Answers Questions 100% of the time;able to follow multi-step instructions  -MA     Personal Safety WNL/WFL;good awareness, safety precautions  -MA     Personal Safety Interventions fall prevention program maintained;gait belt;nonskid shoes/slippers when out of bed  -MA     ROM (Range of Motion)    General ROM Detail L LE WFL, R LE in cast  -MA     MMT (Manual Muscle Testing)    General MMT Assessment Detail L LE 4+/5 grossly, unable to assess R LE  -MA     Mobility Assessment/Training    Extremity Weight-Bearing Status right lower extremity  -MA     Right Lower Extremity Weight-Bearing non weight-bearing  -MA     Bed Mobility, Assessment/Treatment    Bed Mobility, Assistive Device bed rails;head of bed elevated  -MA     Bed Mob, Supine to Sit, [place] independent  -MA     Bed Mob, Sit to Supine, [place]  independent  -MA     Transfer Assessment/Treatment    Transfers, Sit-Stand [place] supervision required  -MA     Transfers, Stand-Sit [place] supervision required  -MA     Transfers, Sit-Stand-Sit, Assist Device axillary crutches  -MA     Transfer, Maintain Weight Bearing Status able to maintain weight bearing status  -MA     Transfer, Comment Cues for hand placement and positioning  -MA     Gait Assessment/Treatment    Gait, [place] Level supervision required  -MA     Gait, Assistive Device axillary crutches  -MA     Gait, Distance (Feet) 25  -MA     Gait, Gait Pattern Analysis 2-point gait  -MA     Gait, Maintain Weight Bearing Status able to maintain weight bearing status  -MA     Gait, Comment Cues for stair training, no overt LOB noted  -MA     Stairs Assessment/Treatment    Stairs, Comment Discussed stair training verbally as patient voiced she has 6 steps to enter home. Reinforced importance of NWBing status of R LE and recommended scooting on bottom and using UE to assist in ascending with chair placed at top of step. Patient verbalized understanding and did not have any concerns.  -MA     Positioning and Restraints    Pre-Treatment Position in bed  -MA     Post Treatment Position bed  -MA     In Bed fowlers;call light within reach;encouraged to call for assist;RLE elevated  -MA       02/06/18 0615 02/05/18 0400    General Information    Equipment Currently Used at Home none  -SK     Living Environment    Lives With alone  -SK     Living Arrangements hotel/motel  -SK     Home Accessibility no concerns  -SK     Stair Railings at Home none  -SK     Type of Financial/Environmental Concern no permanent residence   PT. STATES SHE WILL BE LIVING WITH PARENTS IN [place], [place].  -SK     Transportation Available car   FRIEND- ABILIO.  PT. DOES NOT KNOW PHONE NUMBER--IT IS N HER PHONE UPSTAIRS  -SK     Muscle Tone Assessment    Muscle Tone Assessment  Bilateral Upper Extremities  -BM    Bilateral Upper  Extremities Muscle Tone Assessment  other (see comments)   no arm drift  -BM      02/05/18 0253 02/05/18 0000    General Information    Equipment Currently Used at Home none  -BM     Living Environment    Transportation Available car  -BM     Muscle Tone Assessment    Muscle Tone Assessment  Bilateral Upper Extremities  -BM    Bilateral Upper Extremities Muscle Tone Assessment  other (see comments)   no arm drift  -BM      02/04/18 1954 02/04/18 1351    Living Environment    Lives With  alone  -HB    Living Arrangements  hotel/motel  -HB    Home Accessibility  no concerns  -HB    Stair Railings at Home  none  -HB    Type of Financial/Environmental Concern  no permanent residence  -HB    Transportation Available  family or friend will provide;car  -HB    Muscle Tone Assessment    Muscle Tone Assessment Bilateral Upper Extremities  -BM     Bilateral Upper Extremities Muscle Tone Assessment other (see comments)   no arm drift  -BM       02/04/18 1349       General Information    Equipment Currently Used at Home none  -HB       User Key  (r) = Recorded By, (t) = Taken By, (c) = Cosigned By    Initials Name Provider Type     Estephania Calderón, RN Registered Nurse    SK Laly Yoo RN Registered Nurse     Karolina Kong, RN Registered Nurse    MS Tanya Britt, MSW     YUE Carballo, PT Physical Therapist              PT Recommendation and Plan  Anticipated Equipment Needs At Discharge: crutches (Notified TRAVIS Guan to contact materials mgmt)  Anticipated Discharge Disposition: home with assist  PT Frequency: evaluation only (DC home today with family assist)  Plan of Care Review  Plan Of Care Reviewed With: patient  Outcome Summary/Follow up Plan: Patient is a pleasant 37 y.o. female who sustained a Right  Biimalleolar ankle fracture after a fall of unknown cause. s/p R ankle ORIF- NWBing per PT orders. All mobility performed with SV-SBA. Ambulated 25' with crutches- no LOB  noted. Discussed stair training verbally. Patient to MD home today with family assist. FreidaRN notified regarding ordering axillary crutches.              Outcome Measures       02/06/18 1600          How much help from another person do you currently need...    Turning from your back to your side while in flat bed without using bedrails? 4  -MA      Moving from lying on back to sitting on the side of a flat bed without bedrails? 4  -MA      Moving to and from a bed to a chair (including a wheelchair)? 4  -MA      Standing up from a chair using your arms (e.g., wheelchair, bedside chair)? 4  -MA      Climbing 3-5 steps with a railing? 3  -MA      To walk in hospital room? 4  -MA      AM-PAC 6 Clicks Score 23  -MA      Functional Assessment    Outcome Measure Options AM-PAC 6 Clicks Basic Mobility (PT)  -MA        User Key  (r) = Recorded By, (t) = Taken By, (c) = Cosigned By    Initials Name Provider Type    YUE Carballo PT Physical Therapist           Time Calculation:         PT Charges       02/06/18 1616          Time Calculation    Start Time 1600  -MA      Stop Time 1616  -MA      Time Calculation (min) 16 min  -MA      PT Received On 02/06/18  -MA        User Key  (r) = Recorded By, (t) = Taken By, (c) = Cosigned By    Initials Name Provider Type    YUE Carballo PT Physical Therapist          Therapy Charges for Today     Code Description Service Date Service Provider Modifiers Qty    [id number] HC PT EVAL MOD COMPLEXITY 1 2/6/2018 Shanelle Carballo, PT GP 1          PT G-Codes  Outcome Measure Options: AM-PAC 6 Clicks Basic Mobility (PT)      Shanelle Carballo PT  2/6/2018

## 2018-02-06 NOTE — ANESTHESIA PREPROCEDURE EVALUATION
Anesthesia Evaluation     no history of anesthetic complications:         Airway   Mallampati: I  TM distance: >3 FB  Neck ROM: full  no difficulty expected  Dental - normal exam     Pulmonary    (+) a smoker Current Abstained day of surgery,   (-) asthma, recent URI  Cardiovascular     (-) hypertension, dysrhythmias, hyperlipidemia      Neuro/Psych  (-) seizures, TIA, dizziness/light headedness  GI/Hepatic/Renal/Endo    (-) hepatitis, no renal disease, diabetes    Musculoskeletal     Abdominal    Substance History   (+) alcohol use, drug use (heroin overdose)     OB/GYN          Other                                                Anesthesia Plan    ASA 3     general     intravenous induction   Anesthetic plan and risks discussed with patient.

## 2018-02-06 NOTE — PROGRESS NOTES
DC already done by Dr Jackson - d/w. Medically stable for DC. Staying w/ father w/ sister for assistance. Crutches to be supplied prior to DC - d/w [facility]CP

## 2018-02-06 NOTE — ANESTHESIA PROCEDURE NOTES
Peripheral Block    Patient location during procedure: holding area  Start time: 2/6/2018 7:23 AM  Stop time: 2/6/2018 7:37 AM  Reason for block: at surgeon's request and post-op pain management  Performed by  Anesthesiologist: MELVIN MARIE  Preanesthetic Checklist  Completed: patient identified, site marked, surgical consent, pre-op evaluation, timeout performed, IV checked, risks and benefits discussed and monitors and equipment checked  Prep:  Pt Position: right lateral decubitus  Sterile barriers:cap, gloves and mask  Prep: ChloraPrep  Patient monitoring: blood pressure monitoring, continuous pulse oximetry and EKG  Procedure  Sedation:yes    Guidance:ultrasound guided    Block Type:popliteal  Injection Technique:single-shotNeedle Gauge:21 G    Medications  Comment:10cc Carbo and 30 Rop .5% in divided doses with neg asp  Local Injected:ropivacaine 0.5% and 2% Mepivacaine  Post Assessment  Injection Assessment: negative aspiration for heme, no paresthesia on injection and incremental injection  Patient Tolerance:comfortable throughout block  Complications:no  Additional Notes  Divided dosing

## 2018-02-06 NOTE — PLAN OF CARE
Problem: Patient Care Overview (Adult)  Goal: Plan of Care Review  Outcome: Ongoing (interventions implemented as appropriate)   02/06/18 1043   Coping/Psychosocial Response Interventions   Plan Of Care Reviewed With patient   Patient Care Overview   Progress improving   Outcome Evaluation   Outcome Summary/Follow up Plan VSS, Cast CDI, block seems to be working as pt has no sensation in foot however c/o sharp, inner ankle pain, CPOT score 0 , but pt given 1 dose of dilaudid, pt tolerating ice chips, no familiy in waiting area to update- ready to transfer to floor       Problem: Perioperative Period (Adult)  Goal: Signs and Symptoms of Listed Potential Problems Will be Absent or Manageable (Perioperative Period)  Outcome: Ongoing (interventions implemented as appropriate)

## 2018-02-06 NOTE — ANESTHESIA POSTPROCEDURE EVALUATION
"Patient: Beverley Tovar    Procedure Summary     Date Anesthesia Start Anesthesia Stop Room / Location    02/06/18 0744 0959  [facility] /  [facility]       Procedure Diagnosis Surgeon Provider    RIGHT ANKLE OPEN REDUCTION INTERNAL FIXATION (Right Ankle) No diagnosis on file. MD Rhonda Suazo MD          Anesthesia Type: general  Last vitals  BP   105/59 (02/06/18 1020)   Temp   36.8 °C (98.2 °F) (02/06/18 0953)   Pulse   101 (02/06/18 1020)   Resp   14 (02/06/18 1020)     SpO2   99 % (02/06/18 1020)     Post Anesthesia Care and Evaluation    Patient location during evaluation: PACU  Patient participation: complete - patient participated  Level of consciousness: awake and alert  Pain management: adequate  Airway patency: patent  Anesthetic complications: No anesthetic complications    Cardiovascular status: acceptable  Respiratory status: acceptable  Hydration status: acceptable    Comments: /59  Pulse 101  Temp 36.8 °C (98.2 °F) (Oral)   Resp 14  Ht 165.1 cm (65\")  Wt 113 kg (250 lb)  LMP 02/04/2018  SpO2 99%  Breastfeeding? No  BMI 41.6 kg/m2      "

## 2018-02-13 NOTE — PAYOR COMM NOTE
"UR CONTACT:  DOT                  P: [phone]  F: [phone]  RETRO REVIEW         Connie Tovar (37 y.o. Female)     Date of Birth Social Security Number Address Home Phone MRN    1980  NO ADDRESS  [place] [phone] [id number]    Mormonism Marital Status          None Single       Admission Date Admission Type Admitting Provider Attending Provider Department, Room/Bed    18 Emergency Hua Merida MD  [facility] [address], P877/1    Discharge Date Discharge Disposition Discharge Destination        2018 Home or Self Care             Attending Provider: (none)    Allergies:  No Known Allergies    Isolation:  None   Infection:  None   Code Status:  Prior    Ht:  165.1 cm (65\")   Wt:  113 kg (250 lb)    Admission Cmt:  None   Principal Problem:  Bimalleolar fracture, right, closed, initial encounter [S82.841A]                 Active Insurance as of 2018     Primary Coverage     Payor Plan Insurance Group Employer/Plan Group    PASSPORT PASSPORT      Payor Plan Address Payor Plan Phone Number Effective From Effective To    [address] [phone] 2018     [place] [address]       Subscriber Name Subscriber Birth Date Member ID       CONNIE TOVAR 1980 [id number]                 Emergency Contacts      (Rel.) Home Phone Work Phone Mobile Phone    Tylor De Los Santos (Friend) [phone] -- --               History & Physical      Hua Merida MD at 2018  2:46 PM          HISTORY AND PHYSICAL   [facility]        Patient Identification:  Name: Connie Tovar  Age: 37 y.o.  Sex: female  :  1980  MRN: [id number]                     Primary Care Physician: No Known Provider    Chief Complaint:  Right ankle pain status fall post heroin overdose    History of Present Illness:   Mrs Tovar is a 37-year-old female who formally lived in [facility].  She states she started with " "abuse of prescription pain pills which then led to OxyContin which she claims is \"government heroin\".  She came to [place] for detox in which she states she was sober for 2 years but has been abusing heroin on a daily basis for the last 8 months.  She still states she injects on a daily basis and it's very evident as you can see numerous track marks up and down each upper extremity.  Today she elected to self inject IV heroin and ultimately she gave a strong enough dose that she fell over out of her chair and broke her right ankle.  She was discovered and given Narcan by EMS.  Since coming to the ER she was given IV Dilaudid and right lower extremity was splinted.  Other than her right ankle pain she does not voice any additional complaints and states she had been in her usual health leading up to this day.  She denies any fever chills night sweats chest pain palpitations cough or shortness of breath.  She is already asked to leave the floor with use of a wheelchair and even plans on having additional friends up this evening to watch the Super Bowl.    Past Medical History:  Past Medical History:   Diagnosis Date   • History of alcohol abuse    • History of drug abuse      Past Surgical History:  Past Surgical History:   Procedure Laterality Date   •  SECTION     • EYE SURGERY        Home Meds:  Prescriptions Prior to Admission   Medication Sig Dispense Refill Last Dose   • ibuprofen (ADVIL,MOTRIN) 200 MG tablet Take 200 mg by mouth every 6 (six) hours as needed for mild pain (1-3).   2018 at 0300       Allergies:  No Known Allergies  Immunizations:  Immunization History   Administered Date(s) Administered   • Tdap 2016     Social History:   Social History     Social History Narrative     Social History     Social History   • Marital status: Single     Spouse name: N/A   • Number of children: N/A   • Years of education: N/A     Occupational History   • Not on file.     Social History Main Topics " "  • Smoking status: Heavy Tobacco Smoker     Packs/day: 1.00     Types: Cigarettes   • Smokeless tobacco: Never Used   • Alcohol use No   • Drug use: 7.00 per week     Special: Heroin      Comment: used  yesterday   • Sexual activity: Not Currently     Other Topics Concern   • Not on file     Social History Narrative       Family History:  History reviewed. No pertinent family history.     Review of Systems  See history of present illness and past medical history.  Patient denies any acute changes to vision smell taste or sound headache dizziness.  Denies any focal changes to vision smell taste or sound.  Denies any nausea vomiting chest pain palpitations cough or shortness of breath denies abdominal pain dysuria bruising bleeding or focal loss of function.  Denies any redness or swelling around her injection sites.  Admits to right ankle pain.  Remainder of ROS is negative.    Objective:  tMax 24 hrs: Temp (24hrs), Av.2 °F (36.8 °C), Min:97.7 °F (36.5 °C), Max:98.7 °F (37.1 °C)    Vitals Ranges:   Temp:  [97.7 °F (36.5 °C)-98.7 °F (37.1 °C)] 98.7 °F (37.1 °C)  Heart Rate:  [] 80  Resp:  [14-18] 18  BP: (105-141)/(44-95) 121/80      Exam:  /80 (BP Location: Right arm)  Pulse 80  Temp 98.7 °F (37.1 °C) (Oral)   Resp 18  Ht 165.1 cm (65\")  Wt 113 kg (250 lb)  LMP 2018  SpO2 97%  Breastfeeding? No  BMI 41.6 kg/m2    General Appearance:    Alert, cooperative, no distress, AOx3, pleasant    Head:    Normocephalic, without obvious abnormality, atraumatic   Eyes:    PERRL, conjunctiva/corneas clear, EOM's intact, both eyes   Ears:    Normal external ear canals, both ears   Nose:   Nares normal, septum midline, mucosa normal, no drainage    or sinus tenderness   Throat:   Lips, mucosa, and tongue normal. Poor dentition    Neck:   Supple, no JVD       Lungs:     Clear to auscultation bilaterally, respirations unlabored        Heart:    Regular rate and rhythm, S1 and S2 normal, no murmur, rub   " or gallop   Abdomen:     Soft, non-tender, bowel sounds active all four quadrants,     no masses, no hepatomegaly, no splenomegaly   Extremities:   RLE in ACE/splint - NVM intact in toes, no cyanosis or edema       Skin:   Multiple track marks on both UE w/out cellulitis        Neurologic:   CNII-XII intact, normal strength      .    Data Review:  Labs in chart were reviewed.             Imaging Results (all)     Procedure Component Value Units Date/Time    XR Tibia Fibula 2 View Right [[id number]] Collected:  02/04/18 0842     Updated:  02/04/18 0847    Narrative:       XR ANKLE 3+ VW RIGHT-, XR TIBIA FIBULA 2 VW RIGHT-     INDICATIONS:     Trauma     TECHNIQUE: 3 VIEWS OF THE RIGHT ANKLE, 3 VIEWS OF THE RIGHT LOWER LEG     COMPARISON: None available     FINDINGS:      Angulated fracture of distal fibular metaphysis is seen with 6 mm  posterior displacement of distal fracture fragment. Medial malleolus  fracture shows about 1.4 cm lateral displacement of distal fracture  fragment. Talus is laterally and posteriorly subluxed/partly dislocated,  with surrounding soft tissue swelling. Small calcaneal spurring is  noted.       Impression:          Fracture dislocation of the ankle.     This report was finalized on 2/4/2018 8:44 AM by Dr. Escobar Alaniz MD.       XR Ankle 3+ View Right [[id number]] Collected:  02/04/18 0842     Updated:  02/04/18 0847    Narrative:       XR ANKLE 3+ VW RIGHT-, XR TIBIA FIBULA 2 VW RIGHT-     INDICATIONS:     Trauma     TECHNIQUE: 3 VIEWS OF THE RIGHT ANKLE, 3 VIEWS OF THE RIGHT LOWER LEG     COMPARISON: None available     FINDINGS:      Angulated fracture of distal fibular metaphysis is seen with 6 mm  posterior displacement of distal fracture fragment. Medial malleolus  fracture shows about 1.4 cm lateral displacement of distal fracture  fragment. Talus is laterally and posteriorly subluxed/partly dislocated,  with surrounding soft tissue swelling. Small calcaneal spurring  is  noted.       Impression:          Fracture dislocation of the ankle.     This report was finalized on 2/4/2018 8:44 AM by Dr. Escobar Alaniz MD.       XR Ankle 2 View Right [[id number]] Collected:  02/04/18 1035     Updated:  02/04/18 1041    Narrative:       XR ANKLE 2 VW RIGHT-     INDICATIONS:     Postreduction evaluation     TECHNIQUE: 2 VIEWS OF THE RIGHT ANKLE     COMPARISON: 2/4/2018 at 0816 hours     FINDINGS:      Distal tibia and fibular fractures are redemonstrated with persistent  displacement of fracture fragments, and lateral subluxation of the  talus. Posterior subluxation of the talus is decreased since the prior  exam. Splint material obscures some bony detail.       Impression:          As described.     This report was finalized on 2/4/2018 10:38 AM by Dr. Escobar Alaniz MD.               Assessment:  Principal Problem:    Bimalleolar fracture, right, closed, initial encounter  Active Problems:    Pain, acute due to trauma    Drug abuse, IV    Overdose of heroin      Plan:  Orthopedics to surgically correct fracture   -Will keep in splint for now with plans for the OR in the a.m.   -Postoperative DVT prophylaxis per orthopedics    IV drug abuse with heroin overdose requiring Narcan just earlier in the day   -I normally would give no narcotics to an IV drug abuser but given this acute fracture I feel she requires a certain element of pain management.  At this juncture she will be place on scheduled Norco until nothing by mouth without any when necessary medications other than Toradol.  Once nothing by mouth will have IV pain medication available on a every 4 hours prn basis and will immediately convert back to oral regimen post operatively.   -Very high concerned for this patient to abuse while in the hospital.  I very clearly sat down with patient with RN and  present to explain terms of this hospitalization.  If this patient chooses to leave the floor, then I asked that  she be discharged and further escorted from the hospital immediately.  I've also instructed that I do not want any unsupervised visits due to concerns for abuse in which she could easily use her current IV site.  We will provide some type of bedside sitter if any of her friends are to show up.  Patient has already asked for a wheelchair to leave the floor and this will not be granted.  Patient agrees to the above aspects and rules on this hospitalization and will see how this plays out clinically.    Hua Merida MD  2/4/2018  2:46 PM     Electronically signed by Hua Merida MD at 2/4/2018  3:12 PM           Emergency Department Notes      Kannan Dos Santos RN at 2/4/2018  6:39 AM          EMS reports they were called for OD. Pt also has deformity to her right ankle     Kannan Dos Santos RN  02/04/18 0640       Electronically signed by Kannan Dos Santos RN at 2/4/2018  6:40 AM      DANDRE Sanchez at 2/4/2018  6:49 AM      Procedure Orders:    1. Orthopedic Injury Treatment [[id number]] ordered by DANDRE Sanchez at 02/04/18 0946           Attestation signed by Darek Enriquez MD at 2/5/2018  8:08 AM        I supervised care provided by the midlevel provider.    We have discussed this patient's history, physical exam, and treatment plan.   I have reviewed the note and personally saw and examined the patient and agree with the plan of care.  See attached attending note.          For this patient encounter, I reviewed the NP or PA documentation, treatment plan, and medical decision making. Darek Enriquez MD 2/5/2018 8:08 AM                                 EMERGENCY DEPARTMENT ENCOUNTER    CHIEF COMPLAINT  Chief Complaint: ingestion  History given by: patient, EMS  History limited by: patient is amnesic of heroin overdose event  Room Number: 06/06  PMD: No Known Provider      HPI:  Pt is a 37 y.o. female who presents with ingestion. She states  that she recreationally uses IV heroin daily. Today, at about 0600, after she injected heroin while sitting at her hotel room kitchen table, she lost consciousness. At some point, she fell out of her chair and injured her right ankle. The pain is exacerbated by RLE movement. EMS reports that once they administered 2mg narcan, pt came to and became responsive. Pt is not sure how or exactly when the fall occurred. She reports that she only remembers awaking with EMS surrounding her. She denies headache, neck pain, back pain, abd pain, chest pain, trouble breathing, sensory loss, any other illicit drug use, ETOH use, and sustaining any other injury. EMS splinted pt's right ankle for immobilization. Past Medical History of drug abuse.      Duration: occurred around 0600 today  Timing: constant  Location: generalized  Radiation: none  Quality: none  Intensity/Severity: moderate  Progression: resolved  Associated Symptoms: loss of consciousness, right ankle pain s/p fall  Aggravating Factors: using IV heroin  Alleviating Factors: administration of narcan by EMS  Previous Episodes: Pt states that she recreationally uses IV heroin daily.   Treatment before arrival: EMS reports that once they administered 2mg narcan, pt came to and became responsive. EMS also splinted pt's right ankle for immobilization.     PAST MEDICAL HISTORY  Active Ambulatory Problems     Diagnosis Date Noted   • No Active Ambulatory Problems     Resolved Ambulatory Problems     Diagnosis Date Noted   • No Resolved Ambulatory Problems     Past Medical History:   Diagnosis Date   • History of alcohol abuse    • History of drug abuse        PAST SURGICAL HISTORY  Past Surgical History:   Procedure Laterality Date   •  SECTION     • EYE SURGERY         FAMILY HISTORY  History reviewed. No pertinent family history.    SOCIAL HISTORY  Social History     Social History   • Marital status: Single     Spouse name: N/A   • Number of children: N/A   •  Years of education: N/A     Occupational History   • Not on file.     Social History Main Topics   • Smoking status: Heavy Tobacco Smoker     Packs/day: 1.00     Types: Cigarettes   • Smokeless tobacco: Not on file   • Alcohol use No   • Drug use: Yes      Comment: heroin, sober x 1 year    • Sexual activity: Not on file     Other Topics Concern   • Not on file     Social History Narrative         ALLERGIES  Review of patient's allergies indicates no known allergies.    REVIEW OF SYSTEMS  Review of Systems   Constitutional: Negative for chills and fever.   HENT: Negative for sore throat.    Respiratory: Negative for cough and shortness of breath.    Cardiovascular: Negative for chest pain.   Gastrointestinal: Negative for abdominal pain, diarrhea, nausea and vomiting.   Genitourinary: Negative for difficulty urinating and dysuria.   Musculoskeletal: Negative for back pain and neck pain.        Right ankle pain   Skin: Negative for rash.   Neurological: Positive for syncope (loss of consciousness during heroin ingestion). Negative for dizziness, numbness and headaches.   Psychiatric/Behavioral: The patient is not nervous/anxious.        PHYSICAL EXAM  ED Triage Vitals   Temp Heart Rate Resp BP SpO2   02/04/18 0641 02/04/18 0641 02/04/18 0641 02/04/18 0641 02/04/18 0641   97.7 °F (36.5 °C) 104 18 141/95 98 % WNL       Physical Exam   Constitutional: She is oriented to person, place, and time and well-developed, well-nourished, and in no distress.   HENT:   Head: Normocephalic and atraumatic.   Mouth/Throat: Mucous membranes are normal.   Eyes: EOM are normal. No scleral icterus.   Neck: Normal range of motion.   No c-spine tenderness   Cardiovascular: Regular rhythm and normal heart sounds.  Tachycardia present.    Pulses:       Dorsalis pedis pulses are 2+ on the right side, and 2+ on the left side.        Posterior tibial pulses are 2+ on the right side, and 2+ on the left side.   Pulmonary/Chest: Effort normal and  breath sounds normal. No respiratory distress.   Abdominal: Soft. There is no tenderness. There is no rebound and no guarding.   Musculoskeletal: She exhibits deformity (deformity to right ankle).   Tenderness to right ankle, no tenderness to right foot or right knee, able to move right toes, pelvis stable, no t-spine or l-spine tenderness, NV intact distally to all extremities    Neurological: She is alert and oriented to person, place, and time. She has normal motor skills and normal sensation.   Sensation intact distally to RLE, follows commands, answers questions appropriately   Skin: Skin is warm and dry.   Track marks and bruising to BUE   Psychiatric: Mood and affect normal.   Nursing note and vitals reviewed.        RADIOLOGY           XR Tibia Fibula 2 View Right (Final result) (pre reduction) Result time: 02/04/18 08:44:31     Final result by Escobar Alaniz MD (02/04/18 08:44:31)     Impression:        Fracture dislocation of the ankle.     This report was finalized on 2/4/2018 8:44 AM by Dr. Escobar Alaniz MD.        Narrative:     XR ANKLE 3+ VW RIGHT-, XR TIBIA FIBULA 2 VW RIGHT-     INDICATIONS:     Trauma     TECHNIQUE: 3 VIEWS OF THE RIGHT ANKLE, 3 VIEWS OF THE RIGHT LOWER LEG     COMPARISON: None available     FINDINGS:      Angulated fracture of distal fibular metaphysis is seen with 6 mm  posterior displacement of distal fracture fragment. Medial malleolus  fracture shows about 1.4 cm lateral displacement of distal fracture  fragment. Talus is laterally and posteriorly subluxed/partly dislocated,  with surrounding soft tissue swelling. Small calcaneal spurring is  noted.            XR Ankle 2 View Right (Final result) (post reduction) Result time: 02/04/18 10:38:16     Procedure changed from XR Ankle 3+ View Right          Final result by Escobar Alaniz MD (02/04/18 10:38:16)     Impression:        As described.     This report was finalized on 2/4/2018 10:38 AM by Dr. Escobar PACK  MD Corbin.        Narrative:     XR ANKLE 2 VW RIGHT-     INDICATIONS:     Postreduction evaluation     TECHNIQUE: 2 VIEWS OF THE RIGHT ANKLE     COMPARISON: 2/4/2018 at 0816 hours     FINDINGS:      Distal tibia and fibular fractures are redemonstrated with persistent  displacement of fracture fragments, and lateral subluxation of the  talus. Posterior subluxation of the talus is decreased since the prior  exam. Splint material obscures some bony detail.       I ordered the above noted radiological studies and reviewed the images on the PACS system.       PROCEDURES    Lower Extremity Dislocation  Date/Time: 2/4/2018 9:45 AM  Performed by: BEN GUEVARA  Authorized by: AMELIA MOJICA   Consent: Verbal consent obtained. Written consent obtained.  Risks and benefits: risks, benefits and alternatives were discussed  Consent given by: patient  Patient understanding: patient states understanding of the procedure being performed  Patient consent: the patient's understanding of the procedure matches consent given  Imaging studies: imaging studies available  Required items: required blood products, implants, devices, and special equipment available  Patient identity confirmed: verbally with patient and arm band  Injury location: ankle  Location details: right ankle  Injury type: fracture-dislocation  Fracture type: bimalleolar  Pre-procedure neurovascular assessment: neurovascularly intact  Pre-procedure distal perfusion: normal  Pre-procedure neurological function: normal  Pre-procedure range of motion: reduced    Anesthesia:  Local anesthesia used: no    Sedation:  Patient sedated: yes  Sedation type: moderate (conscious) sedation  Sedatives: propofol and see MAR for details (60mg propofol)  Analgesia: see MAR for details and hydromorphone  Sedation start date/time: 2/4/2018 9:45 AM  Sedation end date/time: 2/4/2018 9:50 AM  Vitals: Vital signs were monitored during sedation.  Manipulation performed:  yes  Reduction successful: yes  X-ray confirmed reduction: yes (minimal improvement of alignment on xray)  Immobilization: splint  Splint type: ankle stirrup (posterior)  Supplies used: Ortho-Glass  Post-procedure neurovascular assessment: post-procedure neurovascularly intact  Post-procedure distal perfusion: normal  Post-procedure neurological function: normal  Post-procedure range of motion: improved  Patient tolerance: Patient tolerated the procedure well with no immediate complications  Comments:   9:45 AM- Dr Enriquez administered 60mg propofol for conscious sedation prior to reduction and splint application of right bimalleolar fracture dislocation.      9:46 AM- Reduced and splinted right bimalleolar fracture dislocation.               PROGRESS AND CONSULTS  7:03 AM- Ordered dilaudid and zofran for pain.   7:43 AM- Reviewed pt's history and workup with Dr. Enriquez.  At bedside evaluation, they agree with the plan of care.  8:50 AM- Obtained right ankle xray and right tib/fib xray results-> shows right bimalleolar fracture dislocation.   9:30 AM- Rechecked pt. She is resting comfortably and is in no acute distress. Discussed with pt about right ankle xray and right tib/fib xray findings (right bimalleolar fracture dislocation). Informed pt of plan to reduce and splint her right bimalleolar fracture dislocation under conscious sedation for support and stabilization. Also notified her that we will consult with on call orthopedic surgeon regarding further management. Educated pt that her right bimallolar fracture dislocation could possibly require surgical intervention. Pt verbalizes understanding and agreement.   9:45 AM- Performed reduction and splint application of right bimalleolar fracture dislocation under conscious sedation. See Dr Enriquez's note regarding details of conscious sedation. See above note regarding details of reduction and splint application. Will obtain post reduction right ankle xray.    10:15 AM- Sent call out to on call orthopedic surgeon for consult.   10:35 AM- Discussed case with Dr Jackson (orthopedic surgeon)  Reviewed history, exam, results and treatments.  Discussed concerns and plan of care. Dr Jackson will consult and would like for pt to be admitted to medicine.  10:37 AM- Sent call out to [facility] for admission.  10:40 AM- Post reduction right ankle xray shows minimal improvement of alignment.   11:09 AM- Discussed case with Dr Merida ([facility] hospitalist)  Reviewed history, exam, results and treatments.  Discussed concerns and plan of care. Dr Merida accepts pt to be admitted to med/surg.  11:11 AM- Rechecked pt. She is resting comfortably and is in no acute distress. Discussed with pt about my conversation with Dr Jackson (orthopedic surgeon) and plan for her to be admitted for further care, possible orthopedic surgical intervention, and observation. Pt verbalizes understanding and agreement with plan.       ADMISSION    Discussed treatment plan and reason for admission with pt/family and admitting physician.  Pt/family voiced understanding of the plan for admission for further testing/treatment as needed.      DIAGNOSIS  Final diagnoses:   Bimalleolar fracture with dislocation, right, closed, initial encounter   Fall, initial encounter   Accidental overdose of heroin, initial encounter           COURSE & MEDICAL DECISION MAKING  Pertinent Imaging studies that were ordered and reviewed are noted above.  Results were reviewed/discussed with the patient and they were also made aware of online assess.   Pt also made aware that some labs, such as cultures, will not be resulted during ER visit and follow up with PMD is necessary.     MEDICATIONS GIVEN IN ER  Medications   sodium chloride 0.9 % flush 10 mL (not administered)   HYDROmorphone (DILAUDID) injection 1 mg (1 mg Intravenous Given 2/4/18 0716)   ondansetron (ZOFRAN) injection 4 mg (4 mg Intravenous Given 2/4/18 0716)   Propofol (DIPRIVAN)  "injection 200 mg (60 mg Intravenous Given 2/4/18 0945)       /44  Pulse 82  Temp 97.7 °F (36.5 °C) (Tympanic)   Resp 18  Ht 165.1 cm (65\")  Wt 113 kg (250 lb)  SpO2 99%  BMI 41.6 kg/m2      I personally reviewed the past medical history, past surgical history, social history, family history, current medications and allergies as they appear in this chart.  The scribe's note accurately reflects the work and decisions made by me.     Documentation assistance provided by rossana Elmore for JC Willis on 2/4/2018 at 11:18 AM. Information recorded by the scribe was done at my direction and has been verified and validated by me.       Franko Elmore  02/04/18 1128       Macarena Cisse, DANDRE  02/04/18 1233    Fixed error on ROS.     Macarena Cisse, APRABEL  02/05/18 0736       Electronically signed by Darek Mojica MD at 2/5/2018  8:08 AM      Darek Mojica MD at 2/4/2018  7:44 AM      Procedure Orders:    1. Procedural Sedation [[id number]] ordered by Darek Mojica MD at 02/04/18 0944                Pt presents complaining of R ankle pain and swelling secondary to a slip and fall onset PTA. She denies LOC secondary to a fall. Pt denies extremity numbness or tingling, SOA, neck pain, back pain, or any other symptoms at this time.    On exam:  Heart: RRR without murmur  Lungs: CTAB without respiratory distress  Musculoskeletal: Obvious deformity of R ankle with tenderness, she has normal DP pulses. Pt's cap refill is nml.    Procedural Sedation  Date/Time: 2/4/2018 9:44 AM  Performed by: DAREK MOJICA  Authorized by: DAREK MOJICA     Consent:     Consent obtained:  Verbal    Consent given by:  Patient  Universal protocol:     Procedure explained and questions answered to patient or proxy's satisfaction: yes      Relevant documents present and verified: yes      Test results available and properly labeled: yes      Imaging studies available: yes      Required " blood products, implants, devices, and special equipment available: yes      Site/side marked: yes      Patient identity confirmation method:  Verbally with patient  Indications:     Procedure performed:  Dislocation reduction    Procedure necessitating sedation performed by:  Physician performing sedation    Intended level of sedation:  Moderate (conscious sedation)  Pre-sedation assessment:     Time since last food or drink:  PO intake about eight hours ago    ASA classification: class 1 - normal, healthy patient      Neck mobility: normal      Mouth opening:  3 or more finger widths    Mallampati score:  II - soft palate, uvula, fauces visible    Pre-sedation assessments completed and reviewed: airway patency, cardiovascular function, hydration status, mental status, nausea/vomiting, pain level, respiratory function and temperature      History of difficult intubation: no      Pre-sedation assessment completed:  2/4/2018 9:44 AM  Immediate pre-procedure details:     Reassessment: Patient reassessed immediately prior to procedure      Reviewed: vital signs      Verified: bag valve mask available, emergency equipment available, intubation equipment available, IV patency confirmed, oxygen available, reversal medications available and suction available    Procedure details (see MAR for exact dosages):     Sedation start time:  2/4/2018 9:45 AM    Preoxygenation:  Nasal cannula    Sedation:  Propofol    Intra-procedure monitoring:  Blood pressure monitoring, cardiac monitor, continuous pulse oximetry and frequent vital sign checks    Sedation end time:  2/4/2018 9:51 AM  Post-procedure details:     Post-sedation assessment completed:  2/4/2018 9:54 AM    Attendance: Constant attendance by certified staff until patient recovered      Recovery: Patient returned to pre-procedure baseline      Post-sedation assessments completed and reviewed: airway patency, cardiovascular function, hydration status, mental status,  nausea/vomiting, pain level, respiratory function and temperature      Patient is stable for discharge or admission: yes      Patient tolerance:  Tolerated well, no immediate complications            I reviewed pt's imaging results which show R bimalleolar fracture dislocation. Will admit. Pt understands and agrees with the plan. All questions answered.    I supervised care provided by the midlevel provider.    We have discussed this patient's history, physical exam, and treatment plan.   I have reviewed the note and personally saw and examined the patient and agree with the plan of care. Documentation assistance provided by rossana Naidu for Dr. Enriquez.  Information recorded by the scribe was done at my direction and has been verified and validated by me.     Carmel Naidu  02/04/18 1216       Darek Enriquez MD  02/04/18 1330       Electronically signed by Darek Enriquez MD at 2/4/2018  1:30 PM        Lines, Drains & Airways    Active LDAs     Name:   Placement date:   Placement time:   Site:   Days:    Peripheral IV Line - Single Lumen 02/06/18 1543 median vein (underside of arm), left 22 gauge;1 in length  02/06/18    1543      6         Inactive LDAs     Name:   Placement date:   Placement time:   Removal date:   Removal time:   Site:   Days:    [REMOVED] Peripheral IV Line - Single Lumen 02/04/18 median cubital vein (antecubital fossa), left 20 gauge  02/04/18 02/04/18    2308      less than 1    [REMOVED] Peripheral IV Line - Single Lumen 02/04/18 2308 cephalic vein (lateral side of arm), right 18 gauge  02/04/18    2308    02/06/18    1532      1    [REMOVED] Airway 02/06/18 0750  02/06/18    0750 created via procedure documentation  02/06/18    0951      less than 1                     Operative/Procedure Notes       Hira Jackson MD at 2/6/2018  9:43 AM  Version 1 of 1                   ANKLE OPEN REDUCTION INTERNAL FIXATION  Procedure Note    Beverley Tovar  2/4/2018 -  2/6/2018    Pre-op Diagnosis: Right  Biimalleolar ankle fracture  Post-op Diagnosis:Same  Procedure:  Open Reduction Internal Fixation of Right trimalleolar ankle fracture.  Surgeon:  Hira Jackson MD  Anesthesia: General, Anesthesiologist: Rhonda Ledesma MD  CRNA: Selam Flowers CRNA  Staff: Circulator: Magdalena Restrepo RN  Radiology Technologist: Debra Knapp  Scrub Person: Phil Celestin  Vendor Representative: Saúl Wang [facility]  Estimated Blood Loss: 50 ml  Specimens:   Order Name Source Comment Collection Info Order Time   HCG, SERUM, QUALITATIVE Arm, Left  Collected By: Laly Yoo RN 2/6/2018  6:25 AM     Drains: none  Complications: None  Implants used:    Implant Name Type Inv. Item Serial No.  Lot No. LRB No. Used   PLT FIB PERIART LK D/L 4H 80 RT - [id number] Implant PLT FIB PERIART LK D/L 4H 80 RT  [employer]  Right 1   SCRW ULS LK 2.7X16MM - [id number] Implant SCRW ULS LK 2.7X16MM  [employer]  Right 1   SCRW ULS LK 2.7X18MM - [id number] Implant SCRW ULS LK 2.7X18MM  [employer]  Right 2   SCRW ULS LK 2.7X20MM - [id number] Implant SCRW ULS LK 2.7X20MM  [employer]  Right 2   SCRW PERIART S/TAP HD/2.7MM 3.5X14MM - [id number] Implant SCRW PERIART S/TAP HD/2.7MM 3.5X14MM  [employer]  Right 1   SCRW PERIART S/TAP HD/2.7MM 3.5X16MM - [id number] Implant SCRW PERIART S/TAP HD/2.7MM 3.5X16MM  [employer]  Right 1   SCRW PERIART S/TAP HD/2.7MM 3.5X12MM - [id number] Implant SCRW PERIART S/TAP HD/2.7MM 3.5X12MM  [employer]  Right 1   SCRW PERIART LK HD2.7MM 3.5X14MM - [id number] Implant SCRW PERIART LK HD2.7MM 3.5X14MM  [employer]  Right 1   SCRW LETHA SD/ST 1/2THRD 4X40MM - [id number] Implant SCRW LETHA SD/ST 1/2THRD 4X40MM   [employer]   Right 2       Indication for Procedure:   The patient is a 37 y.o. female  who sustained a ankle fracture after she passed out from heroin use.  EMS was called and gave her narcan and resuscitated her. The fracture was significantly displaced.   Surgical options and non-surgical options were discussed in detail and to the patients satisfaction.  Surgical intervention was recommended based on the patients injury and functional status.     The patient was educated in risks of surgery that could include possible risk of fracture malunion, non-union, painful hardware,  infection, deep venous thrombosis, pulmonary embolism, fracture, neurovascular injury, dislocation, possible persistent pain, need for additional surgeries, anesthetic risks, medical risks including heart attack and stroke, and death.  The patient had the opportunity to ask questions, and concerns about the proposed surgery.  The patient also understood that medicine is not an exact science, and that outcomes of the surgical procedure may be less than desired. The patient wished to proceed.      Protocols for intravenous antibiotics and venous thrombosis were followed for this patient.  IV antibiotics were infused prior to surgery and will be discontinued within 24 hours of completion of the surgical procedure.       DESCRIPTION OF PROCEDURE: After the patient was identified in the preoperative holding area, the operative site was marked and confirmed. The patient was brought to the operating room on a stretcher and placed supine on the operating table. The above anesthetic was placed uneventfully. The patient's operative leg then had a non-sterile tourniquet placed and was then prepped and drapped in usual sterile fashion.  Time-out procedure was performed and IV antibiotics were infused.  An esmarch was used and the touniquet was inflated.  Then a lateral approach to the distal fibula was used with a 10 blade scalpel.  The periosteum was elevated off the fracture and the hematoma was removed.  The fracture was reduced using a lobster bone reduction forceps.  C-arm visualization was used to confirm fracture reduction.   Then the Delgado distal fibular locking plate was applied, and with C-arm  guidance, screws were filled in standard AO technique.     Then a medial curvilinear approach was used for the medial malleolar fracture.  The hematoma was removed.  Then the fracture was reduced using point to point bone reduction forceps.  C-arm was used to confirm anatomic fracture alignment.  Then 2 k-wires were placed distal to proximal crossing the medial malleolar fragment.  Then two 4-0 cannulated screws were placed over these wires up into the tibia proximally.  This held the fracture together nicely.  The posterior tibia was evaluated and it was deemed that it did not need screw fixation.  Final C-arm images were obtained which showed good fracture reduction and hardware placement.  The tourniquet was dropped and the wounds were closed with 0-vicryl to close the fascia, 2-0 vicryl to close the deep dermis, and 3-0 nylon to close the skin.  Sterile dressings were applied and a fiberglass cast that was split to allow for swelling was applied.      The patient was then awakened from anesthesia and returned to recovery room in stable condition. No intraoperative complications.  Sponge and needle counts were correct at the conclusion of the case.      Hira Jackson MD        Electronically signed by Hira Jackson MD at 2/6/2018  9:46 AM           Physician Progress Notes       Hira Jackson MD at 2/5/2018  8:38 AM  Version 2 of 2              DATE OF ADMISSION: 2/4/2018  6:44 AM     LOS: 1 day     Subjective :   C/o. Right ankle pain. Awaiting surgery.    Objective :    Vital signs in last 24 hours:  Vitals:    02/04/18 2030 02/04/18 2330 02/05/18 0300 02/05/18 0731   BP: 133/72 121/67 122/72 114/73   BP Location: Left arm Left arm Left arm Left arm   Patient Position: Lying Lying Lying Lying   Pulse: 90 90 92 87   Resp: 16 16 16 16   Temp: 98.5 °F (36.9 °C) 97.2 °F (36.2 °C) 98 °F (36.7 °C) 98 °F (36.7 °C)   TempSrc: Oral Oral Oral Oral   SpO2: 96% 99% 98% 98%   Weight:       Height:         Body mass index is  41.6 kg/(m^2).    PHYSICAL EXAM:  Patient is calm, in no acute distress, awake and oriented x 3.  Skin on right ankle is clean, dry and intact.  No signs of infection.  Swelling is appropriate in amount.  Homans test is negative.  Patient is neurovascularly intact distally.    LABS:    Results from last 7 days  Lab Units 02/04/18  1614   WBC 10*3/mm3 7.02   HEMOGLOBIN g/dL 12.7   HEMATOCRIT % 38.7   PLATELETS 10*3/mm3 243       Results from last 7 days  Lab Units 02/04/18  1614   SODIUM mmol/L 141   POTASSIUM mmol/L 3.7   CHLORIDE mmol/L 103   CO2 mmol/L 25.5   BUN mg/dL 12   CREATININE mg/dL 0.59   GLUCOSE mg/dL 129*   CALCIUM mg/dL 8.3*           ASSESSMENT:  Right ankle dislocation and fracture     Plan:  Continue pain control. Surgical planning for today or tomorrow am.   Continue splint  Continue SCDs, Continue DVT prophylaxis.  .    Julia Polanco PA-C    Date: 2/5/2018  Time: 8:38 AM    As above.  Surgery scheduled for tomorrow.  NPO after midnight.    Hira Jackson MD     Electronically signed by Hira Jackson MD at 2/5/2018  3:52 PM      Julia Polanco PA-C at 2/5/2018  8:38 AM  Version 1 of 2              DATE OF ADMISSION: 2/4/2018  6:44 AM     LOS: 1 day     Subjective :   C/o. Right ankle pain. Awaiting surgery.    Objective :    Vital signs in last 24 hours:  Vitals:    02/04/18 2030 02/04/18 2330 02/05/18 0300 02/05/18 0731   BP: 133/72 121/67 122/72 114/73   BP Location: Left arm Left arm Left arm Left arm   Patient Position: Lying Lying Lying Lying   Pulse: 90 90 92 87   Resp: 16 16 16 16   Temp: 98.5 °F (36.9 °C) 97.2 °F (36.2 °C) 98 °F (36.7 °C) 98 °F (36.7 °C)   TempSrc: Oral Oral Oral Oral   SpO2: 96% 99% 98% 98%   Weight:       Height:         Body mass index is 41.6 kg/(m^2).    PHYSICAL EXAM:  Patient is calm, in no acute distress, awake and oriented x 3.  Skin on right ankle is clean, dry and intact.  No signs of infection.  Swelling is appropriate in amount.  Homans test is negative.  Patient  "is neurovascularly intact distally.    LABS:    Results from last 7 days  Lab Units 18  1614   WBC 10*3/mm3 7.02   HEMOGLOBIN g/dL 12.7   HEMATOCRIT % 38.7   PLATELETS 10*3/mm3 243       Results from last 7 days  Lab Units 18  1614   SODIUM mmol/L 141   POTASSIUM mmol/L 3.7   CHLORIDE mmol/L 103   CO2 mmol/L 25.5   BUN mg/dL 12   CREATININE mg/dL 0.59   GLUCOSE mg/dL 129*   CALCIUM mg/dL 8.3*           ASSESSMENT:  Right ankle dislocation and fracture     Plan:  Continue pain control. Surgical planning for today or tomorrow am.   Continue splint  Continue SCDs, Continue DVT prophylaxis.  .    Julia Polanco PA-C    Date: 2018  Time: 8:38 AM         Electronically signed by Julia Polanco PA-C at 2018 12:44 PM      Hua Merida MD at 2018  9:34 AM  Version 2 of 2             DAILY PROGRESS NOTE  [facility]    Patient Identification:  Name: Beverley Tovar  Age: 37 y.o.  Sex: female  :  1980  MRN: [id number]         Primary Care Physician: No Known Provider    Subjective:  Interval History: No new issues whether it be medical or social.  Pain is decently controlled.  Denies chest pain shortness of breath or altered mentation.    Objective: No family at bedside    Scheduled Meds:   Continuous Infusions:     Vital signs in last 24 hours:  Temp:  [97.2 °F (36.2 °C)-98.7 °F (37.1 °C)] 98 °F (36.7 °C)  Heart Rate:  [] 87  Resp:  [14-18] 16  BP: (105-141)/(44-86) 114/73    Intake/Output:    Intake/Output Summary (Last 24 hours) at 18 0934  Last data filed at 18 0731   Gross per 24 hour   Intake              840 ml   Output              700 ml   Net              140 ml       Exam:  /73 (BP Location: Left arm, Patient Position: Lying)  Pulse 87  Temp 98 °F (36.7 °C) (Oral)   Resp 16  Ht 165.1 cm (65\")  Wt 113 kg (250 lb)  LMP 2018  SpO2 98%  Breastfeeding? No  BMI 41.6 kg/m2    General Appearance:    Alert, cooperative, no distress, " AAOx3, Pain seems controlled                         Throat:   Lips, tongue, gums normal; oral mucosa pink and moist                           Neck:   Supple, symmetrical, trachea midline, no JVD                         Lungs:    Clear to auscultation bilaterally, respirations unlabored                          Heart:    Regular rate and rhythm, S1 and S2 normal                  Abdomen:     Soft, non-tender, bowel sounds active                 Extremities:   Ace/splint to right lower extremity - NVM distally, no cyanosis or edema                             Data Review:  Labs in chart were reviewed.    Assessment:  Active Hospital Problems (** Indicates Principal Problem)    Diagnosis Date Noted   • **Bimalleolar fracture, right, closed, initial encounter [S82.841A] 2018   • Pain, acute due to trauma [G89.11] 2018   • Drug abuse, IV [F19.10] 2018   • Overdose of heroin [T40.1X1A] 2018      Resolved Hospital Problems    Diagnosis Date Noted Date Resolved   No resolved problems to display.       Plan:  OR today - postop DVT prophylaxis per orthopedics and appreciate their assistance    Patient at this juncture is very compliant with pain medication regimen as well as plans to not leave the floor and if visitors come that they are accompanied by staff member    Currently on IV when necessary Dilaudid and postoperatively will transfer back to [place] on a every 4 when necessary basis.  Discussed plan with nurse.    Tobacco abuse - declines NicoNiko    Hua Merida MD  2018  9:34 AM       Electronically signed by Hua Merida MD at 2018  9:39 AM      Hua Merida MD at 2018  9:34 AM  Version 1 of 2             DAILY PROGRESS NOTE  [facility]    Patient Identification:  Name: Beverley Tovar  Age: 37 y.o.  Sex: female  :  1980  MRN: [id number]         Primary Care Physician: No Known Provider    Subjective:  Interval History: No new issues  "whether it be medical or social.  Pain is decently controlled.  Denies chest pain shortness of breath or altered mentation.    Objective: No family at bedside    Scheduled Meds:   Continuous Infusions:     Vital signs in last 24 hours:  Temp:  [97.2 °F (36.2 °C)-98.7 °F (37.1 °C)] 98 °F (36.7 °C)  Heart Rate:  [] 87  Resp:  [14-18] 16  BP: (105-141)/(44-86) 114/73    Intake/Output:    Intake/Output Summary (Last 24 hours) at 02/05/18 0934  Last data filed at 02/05/18 0731   Gross per 24 hour   Intake              840 ml   Output              700 ml   Net              140 ml       Exam:  /73 (BP Location: Left arm, Patient Position: Lying)  Pulse 87  Temp 98 °F (36.7 °C) (Oral)   Resp 16  Ht 165.1 cm (65\")  Wt 113 kg (250 lb)  LMP 02/04/2018  SpO2 98%  Breastfeeding? No  BMI 41.6 kg/m2    General Appearance:    Alert, cooperative, no distress, AAOx3, Pain seems controlled                         Throat:   Lips, tongue, gums normal; oral mucosa pink and moist                           Neck:   Supple, symmetrical, trachea midline, no JVD                         Lungs:    Clear to auscultation bilaterally, respirations unlabored                          Heart:    Regular rate and rhythm, S1 and S2 normal                  Abdomen:     Soft, non-tender, bowel sounds active                 Extremities:   Ace/splint to right lower extremity - NVM distally, no cyanosis or edema                             Data Review:  Labs in chart were reviewed.    Assessment:  Active Hospital Problems (** Indicates Principal Problem)    Diagnosis Date Noted   • **Bimalleolar fracture, right, closed, initial encounter [S82.841A] 02/04/2018   • Pain, acute due to trauma [G89.11] 02/04/2018   • Drug abuse, IV [F19.10] 02/04/2018   • Overdose of heroin [T40.1X1A] 02/04/2018      Resolved Hospital Problems    Diagnosis Date Noted Date Resolved   No resolved problems to display.       Plan:  OR today - postop DVT " prophylaxis per orthopedics and appreciate their assistance    Patient at this juncture is very compliant with pain medication regimen as well as plans to not leave the floor and if visitors come that they are accompanied by staff member    Currently on IV when necessary Dilaudid and postoperatively will transfer back to [place] on a every 4 when necessary basis.  Discussed plan with nurse.      Hua Merida MD  2/5/2018  9:34 AM       Electronically signed by Hua Merida MD at 2/5/2018  9:38 AM      Hua Merida MD at 2/6/2018  3:20 PM  Version 1 of 1         DC already done by Dr Jackson - d/w. Medically stable for DC. Staying w/ father w/ sister for assistance. Crutches to be supplied prior to DC - d/w CCP     Electronically signed by Hua Merida MD at 2/6/2018  3:21 PM           Consult Notes       Hira Jackson MD at 2/4/2018  2:20 PM  Version 2 of 2     Consult Orders:    1. Ortho (on-call MD unless specified) [[id number]] ordered by DANDRE Sanchez at 02/04/18 1015                      ORTHOPEDIC SURGERY CONSULT      Patient: Beverley Tovar  Date of Admission: 2/4/2018  6:44 AM  YOB: 1980  Medical Record Number: [id number]  Attending Physician: Hua Merida MD  Consulting Physician: Julia Polanco PA-C    CHIEF COMPLIANT: Right ankle pain     HISTORY OF PRESENT ILLINESS: Patient is a 37 y.o. year old female presents to [facility] with above complaints.  I was consulted for further evaluation and treatment. Patient presented to the ER after falling in her home. She had immediate onset of right ankle pain. She was diagnosed in the ER with a bimalleolar ankle fracture. She was admitted to the floor. Patient denies any numbess or tingling. She does admit to history of IV drug abuse.     ALLERGIES: No Known Allergies    HOME MEDICATIONS:  Prescriptions Prior to Admission   Medication Sig Dispense Refill Last Dose   • ibuprofen  (ADVIL,MOTRIN) 200 MG tablet Take 200 mg by mouth every 6 (six) hours as needed for mild pain (1-3).   2018 at 0300       CURRENT MEDICATIONS:  Scheduled Meds:   Continuous Infusions:   PRN Meds:.•  Insert peripheral IV **AND** sodium chloride    Past Medical History:   Diagnosis Date   • History of alcohol abuse    • History of drug abuse      Past Surgical History:   Procedure Laterality Date   •  SECTION     • EYE SURGERY       Social History     Occupational History   • Not on file.     Social History Main Topics   • Smoking status: Heavy Tobacco Smoker     Packs/day: 1.00     Types: Cigarettes   • Smokeless tobacco: Never Used   • Alcohol use No   • Drug use: 7.00 per week     Special: Heroin      Comment: used  yesterday   • Sexual activity: Not Currently      Social History     Social History Narrative     History reviewed. No pertinent family history.    REVIEW OF SYSTEMS:    HEENT: Patient denies any headaches, vision changes, change in hearing, or tinnitus, Patient denies epistaxis, sinus pain, hoarseness, or dysphagia   Pulmonary: Patient denies any cough, congestion, acute change in SOA or wheezing.   Cardiovascular: Patient denies any change in chest pain, dyspnea, palpitations, weakness, intolerance of exercise, varicosities, change in murmur   Gastrointestinal:  Patient denies change in appetite, melena, change in bowel habits.   Genital/Urinary: Patient denies dysuria, change in color of urine, change in frequency of urination, pain with urgency, change in incontinence, retention.   Musculoskeletal: Patient denies complaints of acute changes in symptoms of other joints not mentioned above.   Neurological: Patient denies changes in dizziness, tremor, ataxia, or difficulty in speaking or changes in memory.   Endocrine system: Patient denies acute changes in tremors, palpitations, polyuria, polydipsia, polyphagia, diaphoresis, exophthalmos, or goiter.   Psychological: Patient denies  thoughts/plans or harming self or other; denies acute changes in depression,  insomnia, night terrors, laverne, disorientation.   Skin: Patient denies any bruising, rashes, discoloration, pruritus,or wounds not mentioned in history of present illness or chief complaint above.   Hematopoietic: Patient denies current bleeding, epistaxis, hematuria, or melena.    PHYSICAL EXAM:   Vitals:  Vitals:    02/04/18 1214 02/04/18 1229 02/04/18 1245 02/04/18 1245   BP:    121/80   BP Location:    Right arm   Pulse: 73 84 81 80   Resp:       Temp:    98.7 °F (37.1 °C)   TempSrc:    Oral   SpO2: 97% 98% 99% 97%   Weight:       Height:           General:  37 y.o. female who appears about stated age.    Alert, cooperative, in no acute distress         Head:    Normocephalic, without obvious abnormality, atraumatic   Eyes:            Lids and lashes normal, conjunctivae and sclerae normal, no         icterus, no pallor, corneas clear, PERRLA   Ears:    Ears appear intact with no abnormalities noted   Throat:   No oral lesions, no thrush, oral mucosa moist   Neck:   No adenopathy, supple, trachea midline, no JVD   Back:     Limited exam shows no severe kyphosis present,no visible           erythema, no excessive  tenderness to palpation.    Lungs:     Respirations regular, even and unlabored.     Heart:    Normal rate, Pulses palpable   Chest Wall:    No abnormalities observed.   Abdomen:     Normal bowel sounds, no masses, no organomegaly, soft              non-tender, non-distended, no guarding, no rebound                      tenderness   Rectal:     Deferred   Pulses:   Pulses palpable and equal bilaterally   Skin:   No bleeding, bruising or rash   Lymph nodes:   No palpable adenopathy   Extremities:     Right ankle is in a well fitting splint. She is NVID. Negative Homans. Skin is intact. She is able to move all toes. ROM not assessed. Knee nontender to palpation. Pulses 2+    DIAGNOSTIC TEST:  No visits with results within 2  Day(s) from this visit.  Latest known visit with results is:    Admission on 05/04/2016, Discharged on 05/04/2016   Component Date Value Ref Range Status   • Hepatitis B Surface Ag 05/04/2016 Non-Reactive  Non-Reactive Final   • Hep A IgM 05/04/2016 Non-Reactive  Non-Reactive Final   • Hep B C IgM 05/04/2016 Non-Reactive  Non-Reactive Final   • Hepatitis C Ab 05/04/2016 Non-Reactive  Non-Reactive Final   • HIV-1/ HIV-2 05/04/2016 Non-Reactive  Non-Reactive Final   • HIV-1 P24 Ag Screen 05/04/2016 Non-Reactive   Final       Xr Tibia Fibula 2 View Right    Result Date: 2/4/2018  Narrative: XR ANKLE 3+ VW RIGHT-, XR TIBIA FIBULA 2 VW RIGHT-  INDICATIONS:     Trauma  TECHNIQUE: 3 VIEWS OF THE RIGHT ANKLE, 3 VIEWS OF THE RIGHT LOWER LEG  COMPARISON: None available  FINDINGS:   Angulated fracture of distal fibular metaphysis is seen with 6 mm posterior displacement of distal fracture fragment. Medial malleolus fracture shows about 1.4 cm lateral displacement of distal fracture fragment. Talus is laterally and posteriorly subluxed/partly dislocated, with surrounding soft tissue swelling. Small calcaneal spurring is noted.      Impression:  Fracture dislocation of the ankle.  This report was finalized on 2/4/2018 8:44 AM by Dr. Escobar Alaniz MD.      Xr Ankle 2 View Right    Result Date: 2/4/2018  Narrative: XR ANKLE 2 VW RIGHT-  INDICATIONS:     Postreduction evaluation  TECHNIQUE: 2 VIEWS OF THE RIGHT ANKLE  COMPARISON: 2/4/2018 at 0816 hours  FINDINGS:   Distal tibia and fibular fractures are redemonstrated with persistent displacement of fracture fragments, and lateral subluxation of the talus. Posterior subluxation of the talus is decreased since the prior exam. Splint material obscures some bony detail.      Impression:  As described.  This report was finalized on 2/4/2018 10:38 AM by Dr. Escobar Alaniz MD.      Xr Ankle 3+ View Right    Result Date: 2/4/2018  Narrative: XR ANKLE 3+ VW RIGHT-, XR TIBIA FIBULA  2 VW RIGHT-  INDICATIONS:     Trauma  TECHNIQUE: 3 VIEWS OF THE RIGHT ANKLE, 3 VIEWS OF THE RIGHT LOWER LEG  COMPARISON: None available  FINDINGS:   Angulated fracture of distal fibular metaphysis is seen with 6 mm posterior displacement of distal fracture fragment. Medial malleolus fracture shows about 1.4 cm lateral displacement of distal fracture fragment. Talus is laterally and posteriorly subluxed/partly dislocated, with surrounding soft tissue swelling. Small calcaneal spurring is noted.      Impression:  Fracture dislocation of the ankle.  This report was finalized on 2/4/2018 8:44 AM by Dr. Escobar Alaniz MD.          ASSESSMENT:  Bimalleolar ankle fracture  IV Drug user  Patient Active Problem List   Diagnosis   • Bimalleolar fracture, right, closed, initial encounter       PLAN:    I discussed treatment options with the patient. It was discussed that this will need surgical fixation with ORIF of the right fibula and medial malleolus. Dr Jackson was made aware. We will likely move forward with this tomorrow. NPO after midnight. Continue with pain control.     Risks and benefits of surgery were discussed with the patient in detail.   Risks include infection, blood clot, fracture, need for additional surgery, anesthesia problems, etc.     The above diagnosis and treatment plan was discussed with the patient.  They were educated in treatment options for their condition.   They were given the opportunity to ask questions and were answered to their satisfaction.  They agreed to proceed with the above treatment plan.        Julia Polanco PA-C  Date: 2/4/2018    As Above.      Hira Jackson MD     Electronically signed by Hira Jackson MD at 2/5/2018  3:51 PM      Julia Polanco PA-C at 2/4/2018  2:20 PM  Version 1 of 2               ORTHOPEDIC SURGERY CONSULT      Patient: Beverley Tovar  Date of Admission: 2/4/2018  6:44 AM  YOB: 1980  Medical Record Number: [id number]  Attending Physician:  Hua Merida MD  Consulting Physician: Julia Polanco PA-C    CHIEF COMPLIANT: Right ankle pain     HISTORY OF PRESENT ILLINESS: Patient is a 37 y.o. year old female presents to [facility] with above complaints.  I was consulted for further evaluation and treatment. Patient presented to the ER after falling in her home. She had immediate onset of right ankle pain. She was diagnosed in the ER with a bimalleolar ankle fracture. She was admitted to the floor. Patient denies any numbess or tingling. She does admit to history of IV drug abuse.     ALLERGIES: No Known Allergies    HOME MEDICATIONS:  Prescriptions Prior to Admission   Medication Sig Dispense Refill Last Dose   • ibuprofen (ADVIL,MOTRIN) 200 MG tablet Take 200 mg by mouth every 6 (six) hours as needed for mild pain (1-3).   2018 at 0300       CURRENT MEDICATIONS:  Scheduled Meds:   Continuous Infusions:   PRN Meds:.•  Insert peripheral IV **AND** sodium chloride    Past Medical History:   Diagnosis Date   • History of alcohol abuse    • History of drug abuse      Past Surgical History:   Procedure Laterality Date   •  SECTION     • EYE SURGERY       Social History     Occupational History   • Not on file.     Social History Main Topics   • Smoking status: Heavy Tobacco Smoker     Packs/day: 1.00     Types: Cigarettes   • Smokeless tobacco: Never Used   • Alcohol use No   • Drug use: 7.00 per week     Special: Heroin      Comment: used  yesterday   • Sexual activity: Not Currently      Social History     Social History Narrative     History reviewed. No pertinent family history.    REVIEW OF SYSTEMS:    HEENT: Patient denies any headaches, vision changes, change in hearing, or tinnitus, Patient denies epistaxis, sinus pain, hoarseness, or dysphagia   Pulmonary: Patient denies any cough, congestion, acute change in SOA or wheezing.   Cardiovascular: Patient denies any change in chest pain, dyspnea, palpitations, weakness,  intolerance of exercise, varicosities, change in murmur   Gastrointestinal:  Patient denies change in appetite, melena, change in bowel habits.   Genital/Urinary: Patient denies dysuria, change in color of urine, change in frequency of urination, pain with urgency, change in incontinence, retention.   Musculoskeletal: Patient denies complaints of acute changes in symptoms of other joints not mentioned above.   Neurological: Patient denies changes in dizziness, tremor, ataxia, or difficulty in speaking or changes in memory.   Endocrine system: Patient denies acute changes in tremors, palpitations, polyuria, polydipsia, polyphagia, diaphoresis, exophthalmos, or goiter.   Psychological: Patient denies thoughts/plans or harming self or other; denies acute changes in depression,  insomnia, night terrors, laverne, disorientation.   Skin: Patient denies any bruising, rashes, discoloration, pruritus,or wounds not mentioned in history of present illness or chief complaint above.   Hematopoietic: Patient denies current bleeding, epistaxis, hematuria, or melena.    PHYSICAL EXAM:   Vitals:  Vitals:    02/04/18 1214 02/04/18 1229 02/04/18 1245 02/04/18 1245   BP:    121/80   BP Location:    Right arm   Pulse: 73 84 81 80   Resp:       Temp:    98.7 °F (37.1 °C)   TempSrc:    Oral   SpO2: 97% 98% 99% 97%   Weight:       Height:           General:  37 y.o. female who appears about stated age.    Alert, cooperative, in no acute distress         Head:    Normocephalic, without obvious abnormality, atraumatic   Eyes:            Lids and lashes normal, conjunctivae and sclerae normal, no         icterus, no pallor, corneas clear, PERRLA   Ears:    Ears appear intact with no abnormalities noted   Throat:   No oral lesions, no thrush, oral mucosa moist   Neck:   No adenopathy, supple, trachea midline, no JVD   Back:     Limited exam shows no severe kyphosis present,no visible           erythema, no excessive  tenderness to palpation.     Lungs:     Respirations regular, even and unlabored.     Heart:    Normal rate, Pulses palpable   Chest Wall:    No abnormalities observed.   Abdomen:     Normal bowel sounds, no masses, no organomegaly, soft              non-tender, non-distended, no guarding, no rebound                      tenderness   Rectal:     Deferred   Pulses:   Pulses palpable and equal bilaterally   Skin:   No bleeding, bruising or rash   Lymph nodes:   No palpable adenopathy   Extremities:     Right ankle is in a well fitting splint. She is NVID. Negative Homans. Skin is intact. She is able to move all toes. ROM not assessed. Knee nontender to palpation. Pulses 2+    DIAGNOSTIC TEST:  No visits with results within 2 Day(s) from this visit.  Latest known visit with results is:    Admission on 05/04/2016, Discharged on 05/04/2016   Component Date Value Ref Range Status   • Hepatitis B Surface Ag 05/04/2016 Non-Reactive  Non-Reactive Final   • Hep A IgM 05/04/2016 Non-Reactive  Non-Reactive Final   • Hep B C IgM 05/04/2016 Non-Reactive  Non-Reactive Final   • Hepatitis C Ab 05/04/2016 Non-Reactive  Non-Reactive Final   • HIV-1/ HIV-2 05/04/2016 Non-Reactive  Non-Reactive Final   • HIV-1 P24 Ag Screen 05/04/2016 Non-Reactive   Final       Xr Tibia Fibula 2 View Right    Result Date: 2/4/2018  Narrative: XR ANKLE 3+ VW RIGHT-, XR TIBIA FIBULA 2 VW RIGHT-  INDICATIONS:     Trauma  TECHNIQUE: 3 VIEWS OF THE RIGHT ANKLE, 3 VIEWS OF THE RIGHT LOWER LEG  COMPARISON: None available  FINDINGS:   Angulated fracture of distal fibular metaphysis is seen with 6 mm posterior displacement of distal fracture fragment. Medial malleolus fracture shows about 1.4 cm lateral displacement of distal fracture fragment. Talus is laterally and posteriorly subluxed/partly dislocated, with surrounding soft tissue swelling. Small calcaneal spurring is noted.      Impression:  Fracture dislocation of the ankle.  This report was finalized on 2/4/2018 8:44 AM by   Escobar Alaniz MD.      Xr Ankle 2 View Right    Result Date: 2/4/2018  Narrative: XR ANKLE 2 VW RIGHT-  INDICATIONS:     Postreduction evaluation  TECHNIQUE: 2 VIEWS OF THE RIGHT ANKLE  COMPARISON: 2/4/2018 at 0816 hours  FINDINGS:   Distal tibia and fibular fractures are redemonstrated with persistent displacement of fracture fragments, and lateral subluxation of the talus. Posterior subluxation of the talus is decreased since the prior exam. Splint material obscures some bony detail.      Impression:  As described.  This report was finalized on 2/4/2018 10:38 AM by Dr. Escobar Alaniz MD.      Xr Ankle 3+ View Right    Result Date: 2/4/2018  Narrative: XR ANKLE 3+ VW RIGHT-, XR TIBIA FIBULA 2 VW RIGHT-  INDICATIONS:     Trauma  TECHNIQUE: 3 VIEWS OF THE RIGHT ANKLE, 3 VIEWS OF THE RIGHT LOWER LEG  COMPARISON: None available  FINDINGS:   Angulated fracture of distal fibular metaphysis is seen with 6 mm posterior displacement of distal fracture fragment. Medial malleolus fracture shows about 1.4 cm lateral displacement of distal fracture fragment. Talus is laterally and posteriorly subluxed/partly dislocated, with surrounding soft tissue swelling. Small calcaneal spurring is noted.      Impression:  Fracture dislocation of the ankle.  This report was finalized on 2/4/2018 8:44 AM by Dr. Escobar Alaniz MD.          ASSESSMENT:  Bimalleolar ankle fracture  IV Drug user  Patient Active Problem List   Diagnosis   • Bimalleolar fracture, right, closed, initial encounter       PLAN:    I discussed treatment options with the patient. It was discussed that this will need surgical fixation with ORIF of the right fibula and medial malleolus. Dr Jackson was made aware. We will likely move forward with this tomorrow. NPO after midnight. Continue with pain control.     Risks and benefits of surgery were discussed with the patient in detail.   Risks include infection, blood clot, fracture, need for additional surgery,  anesthesia problems, etc.     The above diagnosis and treatment plan was discussed with the patient.  They were educated in treatment options for their condition.   They were given the opportunity to ask questions and were answered to their satisfaction.  They agreed to proceed with the above treatment plan.        Julia Polanco PA-C  Date: 2/4/2018         Electronically signed by Julia Polanco PA-C at 2/4/2018  3:26 PM           Discharge Summary      Hira Jackson MD at 2/6/2018  9:51 AM              Discharge Summary    Date of Admission: 2/4/2018  6:44 AM  Date of Discharge:  2/6/2018    Discharge Diagnosis:   Accidental overdose of heroin, initial encounter [T40.1X1A]  Fall, initial encounter [W19.XXXA]  Bimalleolar fracture, right, closed, initial encounter [S82.841A]      PMHX:   Past Medical History:   Diagnosis Date   • History of alcohol abuse    • History of drug abuse        Discharge Disposition  Home or Self Care    Procedures Performed  Procedure(s):  RIGHT ANKLE OPEN REDUCTION INTERNAL FIXATION       Indication for Admission  Patient is a 37 y.o. female admitted after undergoing the above surgical procedure. They were admitted for post-operative pain control, medical management and physical therapy.  They progressed with physical therapy.    They were deemed stable for discharge.      Consults:   Consults     Date and Time Order Name Status Description    2/4/2018 1455 Inpatient Consult to Orthopedic Surgery      2/4/2018 1037 [facility] (on-call MD unless specified) Completed     2/4/2018 1015 Ortho (on-call MD unless specified) Completed           Discharge Instructions:  Patient is non-weight bearing as tolerated on the operative leg.  Patient is to progress range of motion and ambulation as tolerated.  Use walker/crutches as needed for stability and gait.  Keep cast clean and dry.  Patient will follow-up in the office in 2 weeks.  Call the office at [phone] for any questions or concerns.       Discharge Medications   Beverley Tovar   Home Medication Instructions ROSARIO:[id number]    Printed on:02/06/18 0952   Medication Information                      HYDROcodone-acetaminophen (NORCO)  MG per tablet  Take 1-2 tablets by mouth Every 4 (Four) Hours As Needed for Moderate Pain  for up to 9 days.             ibuprofen (ADVIL,MOTRIN) 200 MG tablet  Take 200 mg by mouth every 6 (six) hours as needed for mild pain (1-3).                 Discharge Diet:   Diet Instructions     Diet: Regular       Discharge Diet:  Regular                 Activity at Discharge:   Activity Instructions     Discharge Activity Restrictions       Keep cast clean and dry.  Elevate leg.  Keep weight off leg.                 Follow-up Appointments  No future appointments.  Additional Instructions for the Follow-ups that You Need to Schedule     Discharge Follow-up with Specified Provider: Dr. Jackson.  [phone]; 2 Weeks    As directed    To:  Dr. Jackson.  [phone]    Follow Up:  2 Weeks                     Test Results Pending at Discharge  none     Hira Jackson MD  02/06/18,  9:52 AM       Electronically signed by Hira Jackson MD at 2/6/2018  9:53 AM

## 2019-12-09 ENCOUNTER — OFFICE VISIT (OUTPATIENT)
Dept: PSYCHIATRY | Facility: CLINIC | Age: 39
End: 2019-12-09

## 2019-12-09 VITALS
BODY MASS INDEX: 41.95 KG/M2 | SYSTOLIC BLOOD PRESSURE: 105 MMHG | HEIGHT: 70 IN | DIASTOLIC BLOOD PRESSURE: 77 MMHG | WEIGHT: 293 LBS | HEART RATE: 86 BPM

## 2019-12-09 DIAGNOSIS — F41.1 GENERALIZED ANXIETY DISORDER: ICD-10-CM

## 2019-12-09 DIAGNOSIS — F33.1 MODERATE EPISODE OF RECURRENT MAJOR DEPRESSIVE DISORDER (HCC): Primary | ICD-10-CM

## 2019-12-09 PROCEDURE — 90792 PSYCH DIAG EVAL W/MED SRVCS: CPT | Performed by: NURSE PRACTITIONER

## 2019-12-09 RX ORDER — SERTRALINE HYDROCHLORIDE 25 MG/1
25 TABLET, FILM COATED ORAL DAILY
Qty: 30 TABLET | Refills: 2
Start: 2019-12-09 | End: 2019-12-18 | Stop reason: SDUPTHER
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
## 2019-12-16 ENCOUNTER — TELEPHONE (OUTPATIENT)
Dept: PSYCHIATRY | Facility: CLINIC | Age: 39
End: 2019-12-16

## 2019-12-16 NOTE — TELEPHONE ENCOUNTER
Patient called and stated that her dr said it was ok to start Zoloft that she was to call back and tell you

## 2019-12-18 DIAGNOSIS — F41.1 GENERALIZED ANXIETY DISORDER: ICD-10-CM

## 2019-12-18 DIAGNOSIS — F33.1 MODERATE EPISODE OF RECURRENT MAJOR DEPRESSIVE DISORDER (HCC): ICD-10-CM

## 2019-12-18 RX ORDER — SERTRALINE HYDROCHLORIDE 25 MG/1
25 TABLET, FILM COATED ORAL DAILY
Qty: 30 TABLET | Refills: 0 | Status: SHIPPED | OUTPATIENT
Start: 2019-12-18 | End: 2019-12-18 | Stop reason: SDUPTHER

## 2019-12-18 RX ORDER — SERTRALINE HYDROCHLORIDE 25 MG/1
25 TABLET, FILM COATED ORAL DAILY
Qty: 30 TABLET | Refills: 0 | Status: SHIPPED | OUTPATIENT
Start: 2019-12-18 | End: 2020-01-13 | Stop reason: SDUPTHER

## 2020-01-13 ENCOUNTER — TELEMEDICINE (OUTPATIENT)
Dept: PSYCHIATRY | Facility: CLINIC | Age: 40
End: 2020-01-13

## 2020-01-13 VITALS
SYSTOLIC BLOOD PRESSURE: 118 MMHG | DIASTOLIC BLOOD PRESSURE: 79 MMHG | WEIGHT: 293 LBS | HEIGHT: 70 IN | BODY MASS INDEX: 41.95 KG/M2 | HEART RATE: 111 BPM

## 2020-01-13 DIAGNOSIS — F33.1 MODERATE EPISODE OF RECURRENT MAJOR DEPRESSIVE DISORDER (HCC): ICD-10-CM

## 2020-01-13 DIAGNOSIS — F41.1 GENERALIZED ANXIETY DISORDER: ICD-10-CM

## 2020-01-13 PROCEDURE — 99213 OFFICE O/P EST LOW 20 MIN: CPT | Performed by: NURSE PRACTITIONER

## 2020-01-13 NOTE — PROGRESS NOTES
Subjective   Beverley Tovar is a 39 y.o. female is here today for medication management follow-up.      This provider is located at [facility],  [address]  The patient  is seen remotely at [facility] at [address] [place] [address]  using [employer], an encrypted service from one [facility] to another,  without staff present.    The patient’s condition being diagnosed/treated is appropriate for telemedicine. The provider identified him/herself as well as his or her credentials.     The patient and/or patients guardian consent to be seen remotely, and when consent is given they understand that the consent allows for patient identifiable information to be sent to a third party as needed.   They may refuse to be seen remotely at any time.  The electronic data is encrypted and password protected, and the patient has been advised of the potential risks to privacy notwithstanding such measures.      Chief Complaint:  Depression and anxiety     History of Present Illness:    Patient presents today for follow up for medication management for depression and anxiety. Patient states they did genetic testing to make sure baby is ok with her age. Patient states she has been taking the zoloft. Patient states she started it a couple days after it was sent in to the pharmacy. Patient states she has noticed she is more tired and has been taking naps during the day. Patient reports having some stomach issues with having diarrhea and is unsure if it is medication related or pregnancy related. Patient states having diarrhea throughout the day with meals. Patient reports drinking plenty of water and staying hydrated. Patient reports currently depression is at a 5 on a 0-10 scale with 10 being the worst. Patient states she does have more good days than bad days. Patient reports symptoms of depression of feeling down, no energy, tired, and no  "motivation. Patient reports anxiety is at a 6-7 on a 0-10 scale with 10 being the worst. Patient states she has been constantly worrying and thinking about the adoption process after birth. Patient states it is still the plan for her sister to adopt the baby after its born. Patient denies any panic attacks. Patient reports sleeping at least 4-5 hours a night in shifts and patient reports having vivid nightmares. Patient reports most sleep she will get at one time is 2-3 hrs. Patient reports appetite is good and eating at least 3 meals a day. Patient denies any auditory or visual hallucinations. Patient adamantly denies any SI or HI. Patient denies any side effects to medications. Patient denies any medical changes since last visit. Patient she just went to [facility] a few days ago. Patient reports going back to [facility] next month. Patient reports seeing Maninder for therapy weekly. Patient reports she is currently 21 weeks pregnant.   The following portions of the patient's history were reviewed and updated as appropriate: allergies, current medications, past family history, past medical history, past social history, past surgical history and problem list.    Review of Systems   Constitutional: Positive for fatigue.   Respiratory: Negative.    Cardiovascular: Negative.    Gastrointestinal: Positive for diarrhea.   Neurological: Negative.    Psychiatric/Behavioral: Positive for dysphoric mood and sleep disturbance. The patient is nervous/anxious.        Objective   Physical Exam   Constitutional: Vital signs are normal. She appears well-developed and well-nourished. She is cooperative.   Neurological: She is alert.   Psychiatric: Her speech is normal and behavior is normal. Judgment and thought content normal. Her mood appears anxious. Cognition and memory are normal.   Vitals reviewed.    Blood pressure 118/79, pulse 111, height 177.8 cm (70\"), weight 134 kg (296 lb 6.4 oz), not currently breastfeeding. Body mass index is " 42.53 kg/m².    No Known Allergies    Medication List:   Current Outpatient Medications   Medication Sig Dispense Refill   • sertraline (ZOLOFT) 50 MG tablet Take 1 tablet by mouth Daily. 30 tablet 0     No current facility-administered medications for this visit.        Mental Status Exam:   Hygiene:   good  Cooperation:  Cooperative  Eye Contact:  Good  Psychomotor Behavior:  Appropriate  Affect:  Appropriate  Hopelessness: Denies  Speech:  Normal  Thought Process:  Goal directed and Linear  Thought Content:  Normal  Suicidal:  None  Homicidal:  None  Hallucinations:  None  Delusion:  None  Memory:  Intact  Orientation:  Person, Place, Time and Situation  Reliability:  fair  Insight:  Fair  Judgement:  Fair  Impulse Control:  Fair  Physical/Medical Issues:  No                 Assessment/Plan   Diagnoses and all orders for this visit:    Moderate episode of recurrent major depressive disorder (CMS/HCC)  -     sertraline (ZOLOFT) 50 MG tablet; Take 1 tablet by mouth Daily.    Generalized anxiety disorder  -     sertraline (ZOLOFT) 50 MG tablet; Take 1 tablet by mouth Daily.            Discussed medication options with patient. Increase Zoloft to 50mg daily for depression and anxiety. Reviewed the risks, benefits, and side effects of the medications; patient acknowledged and verbally consented. Discussed with patient risks associated with medication use and pregnancy. Patient acknowledged and verbally consented. Patient reports her OBGYN is ok with her being on the medication as well. Patient is agreeable to call the [facility] with any questions, concerns, or worsening of symptoms.  Patient is aware to call 911 or go to the nearest ER should begin having SI/HI.          Follow up in four weeks      Errors in dictation may reflect use of voice recognition software and not all errors in transcription may have been detected prior to signing.                 This document has been electronically signed by Estephania  Eduardo, DANDRE   January 13, 2020 1:57 PM

## 2020-01-30 DIAGNOSIS — F33.1 MODERATE EPISODE OF RECURRENT MAJOR DEPRESSIVE DISORDER (HCC): ICD-10-CM

## 2020-01-30 DIAGNOSIS — F41.1 GENERALIZED ANXIETY DISORDER: ICD-10-CM

## 2020-02-24 ENCOUNTER — OFFICE VISIT (OUTPATIENT)
Dept: PSYCHIATRY | Facility: CLINIC | Age: 40
End: 2020-02-24

## 2020-02-24 VITALS
SYSTOLIC BLOOD PRESSURE: 133 MMHG | HEART RATE: 111 BPM | RESPIRATION RATE: 18 BRPM | WEIGHT: 293 LBS | DIASTOLIC BLOOD PRESSURE: 92 MMHG | HEIGHT: 70 IN | BODY MASS INDEX: 41.95 KG/M2

## 2020-02-24 DIAGNOSIS — F33.1 MODERATE EPISODE OF RECURRENT MAJOR DEPRESSIVE DISORDER (HCC): Primary | ICD-10-CM

## 2020-02-24 DIAGNOSIS — F41.1 GENERALIZED ANXIETY DISORDER: ICD-10-CM

## 2020-02-24 PROCEDURE — 90792 PSYCH DIAG EVAL W/MED SRVCS: CPT | Performed by: NURSE PRACTITIONER

## 2020-02-24 RX ORDER — PRENATAL VIT NO.126/IRON/FOLIC 28MG-0.8MG
1 TABLET ORAL DAILY
COMMUNITY

## 2020-02-24 NOTE — PROGRESS NOTES
This provider is located at [facility], [address] [place] [address], [facility], [id number] using [employer].  The patient is seen remotely at [facility], [address] [place] [address], [address], [place] [address] via [employer], an encrypted service provided through [facility] with staff present.  The patient's condition being diagnosed/treated is appropriate for telemedicine. The provider identified herself as well as her credentials.  The patient and/or patient's guardian consent to be seen remotely, and when consent is given they understand that the consent allows for patient identifiable information to be sent to a third party as needed.   They may refuse to be seen remotely at any time. The electronic data is encrypted and password protected, and the patient has been advised of the potential risks to privacy notwithstanding such measures.      Subjective   Beverley Tovar is a 39 y.o. female who presents today for Initial evaluation with this APRN and medication management    Chief Complaint:  Depression and Anxiety    History of Present Illness:  Accompanied by: Patient is alone at today's visit.  This is the first encounter for this APRN with this patient.  The patient describes her mood as improved over the last few weeks as far as depression, but feels like her anxiety is worsened.  The patient states that she doesn't want to admit this, but she has not been on her Zoloft since her last appointment.  She states they increased her Zoloft to 50 mg at her last appointment, and when she went to the pharmacy to pick it up, it wasn't ready.  She states she didn't go back to get the medicine.  She states it is not because she can't afford the medicine, it is not because she doesn't have a ride, she was just afraid to take any medications while she was pregnant.  She states she talked with her OB/GYN and they are okay with her taking the Zoloft, as well as the previous Psychiatric Nurse Practitioner.  She  "states until recently she thinks she has been fine without the Zoloft, her depression is bearable as she is not having any suicidal ideations, but her anxiety has worsened.  Her PHQ9 today is 18, we have discussed these results.  The patient reports her appetite as good, but she knows she is eating too much junk and snack food.  She states her sister does all the grocery shopping in the household, as her sister is the head of that household where she lives, and she usually buys quick easy snack food.  She states she wouldn't mind to cook, but when there is more easy convenient food to eat, that doesn't require any or little effort, she always chooses that.  The patient reports her sleep as fair.  She states due to being pregnant with difficulty getting comfortable and having to get up for restroom breaks, her sleep is interrupted.  She states she usually stays up until around 2 am and then gets up around 10 am.  She states she stays up late because that is the only time she gets to spend alone and have \"me time\".  The patient denies any nightmares or bad dreams.  The patient denies any new medical problems or changes in medications since last appointment.  The patient reports non-compliance with current medication regimen.  The patient denied any side effects when she did take the Zoloft.  The patient denies any abnormal muscle movements or tics.  The patient rates her depression at a 5/10 on a 0-10 scale, with 10 being the worst.  The patient states when she is depressed her sleep is messed up and interrupted, she wants to eat all the time, and she isolates herself some.  The patient rates her anxiety at a 6-7/10 on a 0-10 scale, with 10 being the worst.  She states she just doesn't like getting out or going places.  She states the thought of getting out and being around people makes her feel nervous and anxious.  She states it also causes her to worry a lot.  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  She worries about seeing people that she used to hang out with and having a relapse.  She did have a friend come down from [place] to visit yesterday, and they went to [facility] together, she enjoyed that.  The patient rates hopelessness at a 0/10 on a 0-10 scale with 10 being the worst.  The patient would like to restart her medication at this visit.  She states that when she first started seeking treatment for her depression and anxiety, she could deal with the depressive feelings, she just had a lot of trouble with all the suicidal thoughts she had.  She states she is not having any suicidal thoughts, and has not since being pregnant, but she is afraid as her hormones change, and during the post-partum time that her depression and anxiety may worsen, and she would like to be proactive and have something in her system now in order to prevent that.  The patient denies suicidal ideations and homicidal ideations, and is convincing.  The patient denies any auditory hallucinations or visual hallucinations.  The patient does not endorse significant symptoms consistent with bipolar disorder or a psychotic illness.  She is currently around 26-27 weeks pregnant.  The patient is still seeing Maninder, her therapist, once weekly.  She had a glucose tolerance test this morning, and goes back to see her OB/GYN this afternoon.  She states she is scheduled a  on 20.  She is planning on letting her sister adopt the baby.  She states with her history, her age, she already has a child, and her sister was unable to have children that she feels this is the safest and healthiest option for everyone.  She currently lives with two of her sisters, her brother in law, her father, and her daughter who is age 14.  This sister who will be adopting the baby lives in the same household.  She states she is fine with this and doesn't think it will be any issue.    LMP:  The patient reports she is currently  pregnant, around 26-27 weeks.  She is currently under the care of her OB/GYN at [facility].      PHQ-9 Depression Screening  Little interest or pleasure in doing things? 3   Feeling down, depressed, or hopeless? 2   Trouble falling or staying asleep, or sleeping too much? 2   Feeling tired or having little energy? 2   Poor appetite or overeating? 2   Feeling bad about yourself - or that you are a failure or have let yourself or your family down? 2   Trouble concentrating on things, such as reading the newspaper or watching television? 2   Moving or speaking so slowly that other people could have noticed? Or the opposite - being so fidgety or restless that you have been moving around a lot more than usual? 2   Thoughts that you would be better off dead, or of hurting yourself in some way? 1   PHQ-9 Total Score 18   If you checked off any problems, how difficult have these problems made it for you to do your work, take care of things at home, or get along with other people? Very difficult   PHQ-9 Total Score: 18      The following portions of the patient's history were reviewed and updated as appropriate: allergies, current medications, past family history, past medical history, past social history, past surgical history and problem list.      Past Medical History:  Past Medical History:   Diagnosis Date   • Hepatitis C    • History of alcohol abuse    • History of drug abuse (CMS/HCC)    • Liver disease        Social History:  Social History     Socioeconomic History   • Marital status: Single     Spouse name: Not on file   • Number of children: Not on file   • Years of education: Not on file   • Highest education level: Not on file   Tobacco Use   • Smoking status: Heavy Tobacco Smoker     Packs/day: 0.50     Types: Cigarettes   • Smokeless tobacco: Never Used   Substance and Sexual Activity   • Alcohol use: No   • Drug use: Not Currently     Frequency: 7.0 times per week     Types: Heroin    • Sexual activity: Not Currently       Family History:  Family History   Problem Relation Age of Onset   • Depression Mother    • Depression Sister        Past Surgical History:  Past Surgical History:   Procedure Laterality Date   • ANKLE OPEN REDUCTION INTERNAL FIXATION Right 2018    Procedure: RIGHT ANKLE OPEN REDUCTION INTERNAL FIXATION;  Surgeon: Hira Jackson MD;  Location: [facility];  Service:    •  SECTION     • EYE SURGERY         Problem List:  Patient Active Problem List   Diagnosis   • Bimalleolar fracture, right, closed, initial encounter   • Pain, acute due to trauma   • Drug abuse, IV (CMS/HCC)   • Overdose of heroin (CMS/HCC)   • Moderate episode of recurrent major depressive disorder (CMS/HCC)   • Generalized anxiety disorder       Allergy:   No Known Allergies     Current Medications:   Current Outpatient Medications   Medication Sig Dispense Refill   • Prenatal Vit-Fe Fumarate-FA (PRENATAL, CLASSIC, VITAMIN) 28-0.8 MG tablet tablet Take 1 tablet by mouth Daily.     • sertraline (ZOLOFT) 50 MG tablet Take 1 tablet by mouth Daily. 30 tablet 0     No current facility-administered medications for this visit.        Review of Symptoms:    Review of Systems   Constitutional: Positive for activity change (decreased) and fatigue.   HENT: Negative.    Eyes: Negative.    Respiratory: Negative.    Cardiovascular: Negative.    Gastrointestinal: Negative.    Endocrine: Negative.    Genitourinary: Negative.    Musculoskeletal: Negative.    Skin: Negative.    Neurological: Negative.    Psychiatric/Behavioral: Positive for decreased concentration, dysphoric mood, sleep disturbance and stress. Negative for agitation, behavioral problems, hallucinations, self-injury, suicidal ideas and negative for hyperactivity. The patient is nervous/anxious.          Physical Exam:   Physical Exam   Constitutional: She is oriented to person, place, and time. Vital signs are normal. She appears well-developed  "and well-nourished.   Neurological: She is alert and oriented to person, place, and time.   Psychiatric: She has a normal mood and affect. Her speech is normal and behavior is normal.   Vitals reviewed.    Blood pressure 133/92, pulse 111, resp. rate 18, height 177.8 cm (70\"), weight 136 kg (299 lb), not currently breastfeeding. Body mass index is 42.9 kg/m².   I have discussed at length the patient's elevated blood pressure, and her currently being pregnant.  The patient states she see's her OB/GYN this afternoon, and will discuss this with them then.  Risks of elevated blood pressure and pregnancy discussed, she verbalized understanding in her own words, and plans to address with OB/GYN today.      Mental Status Exam:   Hygiene:   good  Cooperation:  Guarded  Eye Contact:  Fair  Psychomotor Behavior:  Appropriate  Affect:  Appropriate  Mood: normal  Hopelessness: Denies  Speech:  Normal  Thought Process:  Goal directed and Linear  Thought Content:  Normal  Suicidal:  None  Homicidal:  None  Hallucinations:  None  Delusion:  None  Memory:  Intact  Orientation:  Person, Place, Time and Situation  Reliability:  fair  Insight:  Fair  Judgement:  Fair  Impulse Control:  Fair  Physical/Medical Issues:  Currently Pregnant, no other acute physical medical issues reported today       Lab Results:   No recent labs for review today.      Assessment/Plan   Problems Addressed this Visit        Other    Moderate episode of recurrent major depressive disorder (CMS/HCC) - Primary    Generalized anxiety disorder          Visit Diagnoses:    ICD-10-CM ICD-9-CM   1. Moderate episode of recurrent major depressive disorder (CMS/HCC) F33.1 296.32   2. Generalized anxiety disorder F41.1 300.02         GOALS:  Short Term Goals: Patient will be compliant with medication, and patient will have no significant medication related side effects.  Patient will be engaged in psychotherapy as indicated.  Patient will report subjective improvement " of symptoms.  Long term goals: To stabilize mood and treat/improve subjective symptoms, the patient will stay out of the hospital, the patient will be at an optimal level of functioning, and the patient will take all medications as prescribed.  The patient verbalized understanding and agreement with goals that were mutually set.      TREATMENT PLAN: Continue supportive psychotherapy efforts and medications as indicated. Re-start Zoloft at 25 mg by mouth once daily for depression and worsening anxiety.  Discussed off label use of Zoloft (generalized anxiety disorder).  The patient states that she has a prescription for the Zoloft 50 mg tablets at the pharmacy, she does not need a new prescription today, as she plans to take a half a tablet of the Zoloft 50 mg to make a total dosage of Zoloft 25 mg by mouth once daily.  Pharmacological and Non-Pharmacological treatment options discussed during today's visit.  Discussed risk of taking Zoloft while pregnant, and risks to the fetus.  Patient acknowledged and verbally consented with current treatment plan and was educated on the importance of compliance with treatment and follow-up appointments.  She is to follow-up with OB/GYN today as scheduled, and to let them know her current medication regimen and dosing and to make sure they are still in agreement with her taking Zoloft 25 mg by mouth once daily.      MEDICATION ISSUES:    Discussed medication options and treatment plan of prescribed medication as well as the risks, benefits, any black box warnings including increased suicidality, and side effects including potential falls, somnolence, GI side effects (abdominal discomfort, nausea, vomiting, diarrhea, and constipation), activation, and metabolic adversities among others. Patient is agreeable to call the office with any worsening of symptoms or onset of side effects, or if any concerns or questions arise.  The contact information for the office is made available to  the patient. Patient is agreeable to call 911 or go to the nearest ER should they begin having any SI/HI, or if any urgent concerns arise. No medication side effects or related complaints today.     MEDS ORDERED DURING VISIT:  No orders of the defined types were placed in this encounter.  Re-start Zoloft 25 mg by mouth once daily.    Return in about 4 weeks (around 3/23/2020), or if symptoms worsen or fail to improve, for Recheck.         Functional Status: Moderate impairment     Prognosis: Guarded with Ongoing Treatment     Treatment Plan completed 2/24/20.            This document has been electronically signed by DANDRE Wilson  February 24, 2020 3:19 PM    Please note that portions of this note were completed with a voice recognition program. Efforts were made to edit dictation, but occasionally words are mistranscribed.

## 2020-02-24 NOTE — TREATMENT PLAN
Multi-Disciplinary Problems (from Behavioral Health Treatment Plan)    Active Problems     Problem: Anxiety  Start Date: 02/24/20    Problem Details:  The patient self-scales this problem as a 6-7 with 10 being the worst.       Goal Priority Start Date Expected End Date End Date    Patient will develop and implement behavioral and cognitive strategies to reduce anxiety and irrational fears. -- 02/24/20 -- --    Goal Details:  Progress toward goal:  Not appropriate to rate progress toward goal since this is the initial treatment plan.       Goal Intervention Frequency Start Date End Date    Help patient explore past emotional issues in relation to present anxiety. Weekly 02/24/20 --    Intervention Details:  Duration of treatment until until remission of symptoms.       Goal Intervention Frequency Start Date End Date    Help patient develop an awareness of their cognitive and physical responses to anxiety. Weekly 02/24/20 --    Intervention Details:  Duration of treatment until until remission of symptoms.       Prob Intervention Frequency Start Date End Date    Anxiety reduction PRN -- --    Intervention Details:  Frequency to be assessed at each visit and periodically as needed.           Problem: Depression  Start Date: 02/24/20    Problem Details:  The patient self-scales this problem as a 5 with 10 being the worst.       Goal Priority Start Date Expected End Date End Date    Patient will demonstrate the ability to initiate new constructive life skills outside of sessions on a consistent basis. -- 02/24/20 -- --    Goal Details:  Progress toward goal:  Not appropriate to rate progress toward goal since this is the initial treatment plan.       Goal Intervention Frequency Start Date End Date    Assist patient in setting attainable activities of daily living goals. PRN 02/24/20 --    Intervention Details:  Frequency to be assessed at each visit and periodically as needed.     Goal Intervention Frequency Start Date  End Date    Provide education about depression Weekly 02/24/20 --    Intervention Details:  Duration of treatment until until remission of symptoms.       Goal Intervention Frequency Start Date End Date    Assist patient in developing healthy coping strategies. Weekly 02/24/20 --    Intervention Details:  Duration of treatment until until remission of symptoms.       Prob Intervention Frequency Start Date End Date    Provide education about depression PRN -- --    Intervention Details:  Frequency to be assessed at each visit and periodically as needed.                        I have discussed and reviewed this treatment plan with the patient and/or guardian.  The patient has verbally agreed with this treatment plan (no signatures are obtained at today's visit as the patient is a telehealth patient and is unable to print and sign this document, therefore verbal agreement is obtained).

## 2020-03-25 ENCOUNTER — TELEMEDICINE (OUTPATIENT)
Dept: PSYCHIATRY | Facility: CLINIC | Age: 40
End: 2020-03-25

## 2020-03-25 DIAGNOSIS — F33.1 MODERATE EPISODE OF RECURRENT MAJOR DEPRESSIVE DISORDER (HCC): Primary | ICD-10-CM

## 2020-03-25 DIAGNOSIS — F41.1 GENERALIZED ANXIETY DISORDER: ICD-10-CM

## 2020-03-25 DIAGNOSIS — F51.05 INSOMNIA DUE TO MENTAL CONDITION: ICD-10-CM

## 2020-03-25 PROCEDURE — 99214 OFFICE O/P EST MOD 30 MIN: CPT | Performed by: NURSE PRACTITIONER

## 2020-03-25 RX ORDER — BUPROPION HYDROCHLORIDE 75 MG/1
75 TABLET ORAL EVERY MORNING
Qty: 30 TABLET | Refills: 0 | Status: SHIPPED | OUTPATIENT
Start: 2020-03-25 | End: 2020-04-22

## 2020-03-25 NOTE — PROGRESS NOTES
"This provider is located at [facility], [address] [place] [address], [id number] using a private telephone in a secure private environment. The Patient is seen remotely at their home address in [place], [place], using a private telephone. Patient is being evaluated/treated via telehealth by telephone as accepted by Medicaid, and stated they are in a secure environment for this session.  The patient was unable to connect via a video connection so a telephone encounter was used due to extenuating circumstances. The patient’s condition being diagnosed/treated is appropriate for telemedicine. The provider identified herself as well as her credentials.   The patient consents to be seen remotely, and when consent is given they understand that the consent allows for patient identifiable information to be sent to a third party as needed.   They may refuse to be seen remotely at any time. The electronic data is encrypted and password protected, and the patient has been advised of the potential risks to privacy not withstanding such measures.      Subjective   Beverley Tovar is a 39 y.o. female who presents today for follow up    Chief Complaint:  Depression and Anxiety    History of Present Illness:  Accompanied by: Patient states that she is alone at today's telephone visit.  The patient describes her mood as \"not there\" and \"blah\" over the last few weeks.  The patient reports that the Zoloft made her feel continuously nauseated so she stopped taking it this past Friday.  The patient states that the Zoloft made her feel sick to her stomach, it made her feel useless, it made her feel disinterested in everything, and it made her feel like she did not have any moods at all.  The patient states it made her feel sort of numb and blah.  The patient states that she already did not have any motivation, and it seemed to make that feel worse.  The patient states that it even took her guilt away that she had for feeling that she " "does not contribute to the household and for \"not pulling my weight\".  The patient reports her appetite as good.  The patient reports her sleep as poor.  The patient states that she sleeps in 2 to 3-hour increments.  She states that it is hard to get comfortable due to the pregnancy, due to heartburn related to her pregnancy, and due to having to get up and go to the bathroom several times throughout the night to urinate.  The patient states she has only had 1 nightmare since her last visit, she states otherwise she typically does not have any nightmares.  The patient states it really was not a nightmare, it was more of a weird dream, but she dreamed that she was lactating blood.  The patient states that she thought that she was suffering from allergies, but now she feels like it is turned into an upper respiratory infection.  The patient denies any fever.  The patient states that she does not plan to go to the family doctor over the symptoms unless she develops a fever because there are too many germs in the community right now, especially the coronavirus that everybody is trying to self quarantine and stay away from.  The patient denies any other new medical problems or changes in medications since last appointment with this facility, other than her stopping her Zoloft without calling and notifying this office. The patient rates her depression at a 5/10 on a 0-10 scale, with 10 being the worst.  The patient rates her anxiety at a 5/10 on a 0-10 scale, with 10 being the worst.  The patient rates hopelessness at a 0/10 on a 0-10 scale with 10 being the worst.  The patient would like to start a new medication at this visit.  The patient denies suicidal ideations and homicidal ideations, and is convincing.  The patient denies any auditory hallucinations or visual hallucinations.  The patient states that she still plans to let her sister adopt the baby after the birth.  The patient states that she does not plan to " breast-feed at all.  The patient states that she has taken Wellbutrin in the past, that is what has helped her the most out of all medications that she has tried, the patient is requesting to retry Wellbutrin if that is at all possible.        LMP:  The patient reports she is currently pregnant, around 7 months pregnant.  She is currently under the care of her OB/GYN at [facility].        The following portions of the patient's history were reviewed and updated as appropriate: allergies, current medications, past family history, past medical history, past social history, past surgical history and problem list.      Past Medical History:  Past Medical History:   Diagnosis Date   • Hepatitis C    • History of alcohol abuse    • History of drug abuse (CMS/HCC)    • Liver disease        Social History:  Social History     Socioeconomic History   • Marital status: Single     Spouse name: Not on file   • Number of children: Not on file   • Years of education: Not on file   • Highest education level: Not on file   Tobacco Use   • Smoking status: Heavy Tobacco Smoker     Packs/day: 0.50     Types: Cigarettes   • Smokeless tobacco: Never Used   Substance and Sexual Activity   • Alcohol use: No   • Drug use: Not Currently     Frequency: 7.0 times per week     Types: Heroin   • Sexual activity: Not Currently       Family History:  Family History   Problem Relation Age of Onset   • Depression Mother    • Depression Sister        Past Surgical History:  Past Surgical History:   Procedure Laterality Date   • ANKLE OPEN REDUCTION INTERNAL FIXATION Right 2018    Procedure: RIGHT ANKLE OPEN REDUCTION INTERNAL FIXATION;  Surgeon: Hira Jackson MD;  Location: [facility];  Service:    •  SECTION     • EYE SURGERY         Problem List:  Patient Active Problem List   Diagnosis   • Bimalleolar fracture, right, closed, initial encounter   • Pain, acute due to trauma   • Drug abuse, IV (CMS/HCC)   •  Overdose of heroin (CMS/[facility])   • Moderate episode of recurrent major depressive disorder (CMS/HCC)   • Generalized anxiety disorder       Allergy:   No Known Allergies     Current Medications:   Current Outpatient Medications   Medication Sig Dispense Refill   • buPROPion (Wellbutrin) 75 MG tablet Take 1 tablet by mouth Every Morning. 30 tablet 0   • Prenatal Vit-Fe Fumarate-FA (PRENATAL, CLASSIC, VITAMIN) 28-0.8 MG tablet tablet Take 1 tablet by mouth Daily.     • sertraline (ZOLOFT) 50 MG tablet Take 1 tablet by mouth Daily. 30 tablet 0     No current facility-administered medications for this visit.        Review of Symptoms:    Review of Systems   Constitutional: Positive for activity change (decreased) and fatigue.   HENT: Negative.    Eyes: Negative.    Respiratory: Negative.    Cardiovascular: Negative.    Gastrointestinal: Negative.    Endocrine: Negative.    Genitourinary: Negative.    Musculoskeletal: Negative.    Skin: Negative.    Neurological: Negative.    Psychiatric/Behavioral: Positive for decreased concentration, sleep disturbance, depressed mood and stress. Negative for agitation, behavioral problems, dysphoric mood, hallucinations, self-injury, suicidal ideas and negative for hyperactivity. The patient is nervous/anxious.          Physical Exam:   Physical Exam   Constitutional: She is oriented to person, place, and time.   Neurological: She is alert and oriented to person, place, and time.   Psychiatric: Her speech is normal. She expresses no homicidal and no suicidal ideation. She expresses no suicidal plans and no homicidal plans.     not currently breastfeeding. There is no height or weight on file to calculate BMI.   Due to extenuating circumstances and possible current health risks associated with the patient being present in a clinical setting (with current health restrictions in place in regards to possible COVID 19 exposure), the patient was seen remotely today via a telephone encounter  (the patient was unable to connect through a video connection). Unable to obtain vital signs due to nature of remote telephone visit.  Height stated at 70 inches.  Weight stated at 299 pounds, states she hasn't broke 300 pounds yet.      Mental Status Exam:   Hygiene:   unable to assess  Cooperation:  Cooperative  Eye Contact:  unable to assess  Psychomotor Behavior:  unable to assess  Affect:  appropriate as can be assessed with voice only, unable to visualize the patient due to type of visit  Mood: normal  Hopelessness: Denies  Speech:  Normal  Thought Process:  Goal directed and Linear  Thought Content:  Normal  Suicidal:  None  Homicidal:  None  Hallucinations:  None  Delusion:  None  Memory:  Intact  Orientation:  Person, Place, Time and Situation  Reliability:  fair  Insight:  Fair  Judgement:  Fair  Impulse Control:  Fair  Physical/Medical Issues:  Currently Pregnant, no other acute physical medical issues reported today       Lab Results:   No recent labs for review today.      Assessment/Plan   Problems Addressed this Visit        Other    Moderate episode of recurrent major depressive disorder (CMS/HCC) - Primary    Relevant Medications    buPROPion (Wellbutrin) 75 MG tablet    Generalized anxiety disorder    Relevant Medications    buPROPion (Wellbutrin) 75 MG tablet      Other Visit Diagnoses     Insomnia due to mental condition        Relevant Medications    buPROPion (Wellbutrin) 75 MG tablet          Visit Diagnoses:    ICD-10-CM ICD-9-CM   1. Moderate episode of recurrent major depressive disorder (CMS/HCC) F33.1 296.32   2. Generalized anxiety disorder F41.1 300.02   3. Insomnia due to mental condition F51.05 300.9     327.02         GOALS:  Short Term Goals: Patient will be compliant with medication, and patient will have no significant medication related side effects.  Patient will be engaged in psychotherapy as indicated.  Patient will report subjective improvement of symptoms.  Long term goals:  To stabilize mood and treat/improve subjective symptoms, the patient will stay out of the hospital, the patient will be at an optimal level of functioning, and the patient will take all medications as prescribed.  The patient verbalized understanding and agreement with goals that were mutually set.      TREATMENT PLAN: Continue supportive psychotherapy efforts and medications as indicated. Stop Zoloft at 25 mg by mouth once daily due to side effects.  Start Wellbutrin 75 mg by mouth once daily in the morning for depression and mood.  Discussed off label use of Wellbutrin.  The patient is not to start the Wellbutrin until the OB/GYN has cleared the patient to start the Wellbutrin.  The patient verbalizes understanding.  Pharmacological and Non-Pharmacological treatment options discussed during today's visit, including off label usage of medication.  Discussed risk of taking Wellbutrin while pregnant, and risks to the fetus.  Patient acknowledged and verbally consented with current treatment plan and was educated on the importance of compliance with treatment and follow-up appointments.  She is to follow-up with OB/GYN as scheduled, and to let them know her current medication regimen and dosing and to make sure they are in agreement with her taking Wellbutrin before she starts it.      MEDICATION ISSUES:  Discussed medication options and treatment plan of prescribed medication, any off label use of medication, as well as the risks, benefits, any black box warnings including increased suicidality, and side effects including but not limited to potential falls, dizziness, possible impaired driving, GI side effects (change in appetite, abdominal discomfort, nausea, vomiting, diarrhea, and/or constipation), dry mouth, somnolence, sedation, insomnia, activation, agitation, irritation, tremors, abnormal muscle movements or disorders, headache, sweating, possible bruising or rare bleeding, electrolyte and/or fluid  abnormalities, change in blood pressure/heart rate/and or heart rhythm, sexual dysfunction, and metabolic adversities among others. Patient is agreeable to call the office with any worsening of symptoms or onset of side effects, or if any concerns or questions arise.  The contact information for the office is made available to the patient. Patient is agreeable to call 911 or go to the nearest ER should they begin having any SI/HI, or if any urgent concerns arise. No medication side effects or related complaints today.       MEDS ORDERED DURING VISIT:  New Medications Ordered This Visit   Medications   • buPROPion (Wellbutrin) 75 MG tablet     Sig: Take 1 tablet by mouth Every Morning.     Dispense:  30 tablet     Refill:  0   .    Return in about 4 weeks (around 4/22/2020), or if symptoms worsen or fail to improve, for Recheck.       Functional Status: Moderate impairment     Prognosis: Guarded with Ongoing Treatment     Treatment Plan completed 2/24/20.            This document has been electronically signed by DANDRE Wilson  March 25, 2020 14:23    Please note that portions of this note were completed with a voice recognition program. Efforts were made to edit dictation, but occasionally words are mistranscribed.

## 2020-04-22 ENCOUNTER — TELEMEDICINE (OUTPATIENT)
Dept: PSYCHIATRY | Facility: CLINIC | Age: 40
End: 2020-04-22

## 2020-04-22 DIAGNOSIS — F41.1 GENERALIZED ANXIETY DISORDER: ICD-10-CM

## 2020-04-22 DIAGNOSIS — F51.05 INSOMNIA DUE TO MENTAL CONDITION: ICD-10-CM

## 2020-04-22 DIAGNOSIS — F33.1 MODERATE EPISODE OF RECURRENT MAJOR DEPRESSIVE DISORDER (HCC): Primary | ICD-10-CM

## 2020-04-22 PROCEDURE — 99213 OFFICE O/P EST LOW 20 MIN: CPT | Performed by: NURSE PRACTITIONER

## 2020-04-22 NOTE — PROGRESS NOTES
This provider is located at [facility], [address] [place] [address], [id number] using a telephone in a secure private environment. The Patient is seen remotely at their home address in [place], using a private telephone.  The patient is unable to be seen through a MyChart Video Visit through [facility] at today's encounter because the patient was unable to download the zoom nita to complete her video visit, therefore a telephone encounter was conducted. Patient is being evaluated/treated via telehealth by telephone, and stated they are in a secure environment for this session. The patient's condition being diagnosed/treated is appropriate for telemedicine. The provider identified herself as well as her credentials.   The patient, and/or patient's guardian, consent to be seen remotely, and when consent is given they understand that the consent allows for patient identifiable information to be sent to a third party as needed.   They may refuse to be seen remotely at any time. The electronic data is encrypted and password protected, and the patient and/or guardian has been advised of the potential risks to privacy not withstanding such measures.    You have chosen to receive care through a telephone visit. Do you consent to use a telephone visit for your medical care today? Yes  This visit has been rescheduled as a phone visit to comply with patient safety concerns in accordance with CDC recommendations.      Subjective   Beverley Tovar is a 39 y.o. female who presents today for follow up    Chief Complaint:  Depression, anxiety, and insomnia    History of Present Illness:  Accompanied by: The patient reports she is alone at today's visit  The patient describes her mood as okay over the last few weeks.  She states she doesn't want this APRN to be mad at her, but she stopped taking the Wellbutrin after taking it for approximately two weeks.  She reports that the Wellbutrin caused nausea and stomach upset, as the previous  Zoloft as well.  The patient states in the past she has taken Wellbutrin, it did not cause any side effects, and it did improve her symptoms.  The patient reports she feels that she is having intolerance to the antidepressant use because of her pregnancy and her pregnancy hormones.  The patient reports that she is scheduled to have a  on May 17, 2020, unless they decide next week to take the baby sooner because her blood pressures have been running higher, she reports her last blood pressure was somewhere around the 130s over 80s.  She also reports that she plans to have a tubal after/during the .  The patient states that she feels her moods are stable enough right now until after she has the baby, but she feels that after she has the baby she will need to start back on medications.  She plans to make an appointment with this APRN for the week after she has her .  The patient reports her appetite as good.  The patient reports her sleep as fair.  She states she is sleeping, but she still has trouble getting comfortable due to being pregnant, and she also has to get up several times in the night to go to the bathroom.  The patient denies any nightmares or bad dreams.  The patient denies any new medical problems or changes in medications since last appointment with this facility.  The patient rates her depression at a 2-3/10 on a 0-10 scale, with 10 being the worst.  The patient states that she was more depressed because she did not feel like she was helping around the house, she states now that she is full-term pregnancy and is unable to help around the house it makes her feel less depressed because she actually has a reason not to help now and she doesn't have the guild she did before.  The patient rates her anxiety at a 2/10 on a 0-10 scale, with 10 being the worst.  She states that her brother-in-law has been getting on her nerves lately, she states that he has done nothing wrong, she  states that just every little thing he does irritates her and she feels that this is what is causing her anxiety and irritability.  The patient currently lives with the sister and brother in law that are going to adopt her baby.  The patient rates hopelessness at a 0/10 on a 0-10 scale with 10 being the worst.  The patient would like to not start any medications at this visit and wants to wait until after the  to be started on medications.  She is still planning to give the baby to her sister after it is born.  The patient denies suicidal ideations and homicidal ideations, and is convincing.  The patient denies any auditory hallucinations or visual hallucinations.      Last Menstrual Period:  Currently pregnant,  is scheduled 20.      The following portions of the patient's history were reviewed and updated as appropriate: allergies, current medications, past family history, past medical history, past social history, past surgical history and problem list.      Past Medical History:  Past Medical History:   Diagnosis Date   • Hepatitis C    • History of alcohol abuse    • History of drug abuse (CMS/HCC)    • Liver disease        Social History:  Social History     Socioeconomic History   • Marital status: Single     Spouse name: Not on file   • Number of children: Not on file   • Years of education: Not on file   • Highest education level: Not on file   Tobacco Use   • Smoking status: Heavy Tobacco Smoker     Packs/day: 0.50     Types: Cigarettes   • Smokeless tobacco: Never Used   Substance and Sexual Activity   • Alcohol use: No   • Drug use: Not Currently     Frequency: 7.0 times per week     Types: Heroin   • Sexual activity: Not Currently       Family History:  Family History   Problem Relation Age of Onset   • Depression Mother    • Depression Sister        Past Surgical History:  Past Surgical History:   Procedure Laterality Date   • ANKLE OPEN REDUCTION INTERNAL FIXATION Right 2018     Procedure: RIGHT ANKLE OPEN REDUCTION INTERNAL FIXATION;  Surgeon: Hira Jackson MD;  Location: [facility];  Service:    •  SECTION     • EYE SURGERY         Problem List:  Patient Active Problem List   Diagnosis   • Bimalleolar fracture, right, closed, initial encounter   • Pain, acute due to trauma   • Drug abuse, IV (CMS/[facility])   • Overdose of heroin (CMS/[facility])   • Moderate episode of recurrent major depressive disorder (CMS/[facility])   • Generalized anxiety disorder       Allergy:   No Known Allergies     Current Medications:   Current Outpatient Medications   Medication Sig Dispense Refill   • Prenatal Vit-Fe Fumarate-FA (PRENATAL, CLASSIC, VITAMIN) 28-0.8 MG tablet tablet Take 1 tablet by mouth Daily.       No current facility-administered medications for this visit.        Review of Symptoms:    Review of Systems   Constitutional: Positive for fatigue.   HENT: Negative.    Eyes: Negative.    Respiratory: Negative.    Cardiovascular: Negative.    Gastrointestinal: Negative.    Endocrine: Negative.    Genitourinary: Negative.    Musculoskeletal: Negative.    Skin: Negative.    Neurological: Negative.    Psychiatric/Behavioral: Positive for agitation, decreased concentration, sleep disturbance, depressed mood and stress. Negative for behavioral problems, dysphoric mood, hallucinations, self-injury, suicidal ideas and negative for hyperactivity. The patient is nervous/anxious.          Physical Exam:   Physical Exam   Constitutional: She is oriented to person, place, and time.   Neurological: She is alert and oriented to person, place, and time.   Psychiatric: Her speech is normal. She expresses no homicidal and no suicidal ideation. She expresses no suicidal plans and no homicidal plans.         not currently breastfeeding. There is no height or weight on file to calculate BMI.  Due to extenuating circumstances and possible current health risks associated with the patient being present in a clinical  setting (with current health restrictions in place in regards to possible COVID 19 transmission/exposure), the patient was seen remotely today via a telephone encounter.  Unable to obtain vital signs due to nature of remote visit.  Height stated at 70 inches.  Weight stated at 300 pounds.         Mental Status Exam:   Hygiene:   Unable to evaluate at today's visit due to type of visit/encounter -telephone visit/encounter  Cooperation:  Cooperative  Eye Contact:  Unable to evaluate at today's visit due to type of visit/encounter -telephone visit/encounter  Psychomotor Behavior:  Unable to evaluate at today's visit due to type of visit/encounter -telephone visit/encounter  Affect:  Unable to evaluate at today's visit due to type of visit/encounter -telephone visit/encounter  Mood: normal  Hopelessness: Denies  Speech:  Normal  Thought Process:  Goal directed and Linear  Thought Content:  Normal and Mood congruent  Suicidal:  None  Homicidal:  None  Hallucinations:  None  Delusion:  None  Memory:  Intact  Orientation:  Person, Place, Time and Situation  Reliability:  fair  Insight:  Fair  Judgement:  Fair  Impulse Control:  Fair  Physical/Medical Issues:  No        Lab Results:   No recent labs for review.      Assessment/Plan   Problems Addressed this Visit        Other    Moderate episode of recurrent major depressive disorder (CMS/HCC) - Primary    Generalized anxiety disorder      Other Visit Diagnoses     Insomnia due to mental condition              Visit Diagnoses:    ICD-10-CM ICD-9-CM   1. Moderate episode of recurrent major depressive disorder (CMS/HCC) F33.1 296.32   2. Generalized anxiety disorder F41.1 300.02   3. Insomnia due to mental condition F51.05 300.9     327.02         GOALS:  Short Term Goals: Patient will be compliant with therapy.  Patient will be engaged in psychotherapy as indicated.  Patient will report subjective improvement of symptoms.  Long term goals: To stabilize mood and treat/improve  subjective symptoms, the patient will stay out of the hospital, the patient will be at an optimal level of functioning, and the patient will take all medications as prescribed when they are prescribed.  The patient/guardian verbalized understanding and agreement with goals that were mutually set.      TREATMENT PLAN: Continue supportive psychotherapy efforts as indicated. Stop Wellbutrin due to non-compliance, the patient has already stopped taking the Wellbutrin.  The patient does not want to re-start any medication(s) until after her  planned for 20.  Pharmacological and Non-Pharmacological treatment options discussed during today's visit. Patient acknowledged and verbally consented with current treatment plan and was educated on the importance of compliance with treatment and follow-up appointments.      MEDICATION ISSUES:  Patient is agreeable to call the office with any worsening of symptoms, or if any concerns or questions arise.  The contact information for the office is made available to the patient. Patient is agreeable to call 911 or go to the nearest ER should they begin having any SI/HI, or if any urgent concerns arise.     MEDS ORDERED DURING VISIT:  No orders of the defined types were placed in this encounter.      Return in about 5 years (around 2025), or if symptoms worsen or fail to improve, for Recheck.         Progress toward goal: Not at goal    Functional Status: Mild impairment     Prognosis: Fair with Ongoing Treatment      Treatment plan completed 20.            This document has been electronically signed by DANDRE Wilson  2020 14:58    Please note that portions of this note were completed with a voice recognition program. Efforts were made to edit dictation, but occasionally words are mistranscribed.

## 2020-06-10 ENCOUNTER — TELEMEDICINE (OUTPATIENT)
Dept: PSYCHIATRY | Facility: CLINIC | Age: 40
End: 2020-06-10

## 2020-06-10 DIAGNOSIS — F33.1 MODERATE EPISODE OF RECURRENT MAJOR DEPRESSIVE DISORDER (HCC): Primary | ICD-10-CM

## 2020-06-10 DIAGNOSIS — G47.9 SLEEPING DIFFICULTIES: ICD-10-CM

## 2020-06-10 DIAGNOSIS — F41.1 GENERALIZED ANXIETY DISORDER: ICD-10-CM

## 2020-06-10 PROCEDURE — 99214 OFFICE O/P EST MOD 30 MIN: CPT | Performed by: NURSE PRACTITIONER

## 2020-06-10 RX ORDER — BUPROPION HYDROCHLORIDE 75 MG/1
75 TABLET ORAL EVERY MORNING
Qty: 30 TABLET | Refills: 0 | Status: SHIPPED | OUTPATIENT
Start: 2020-06-10 | End: 2020-07-08 | Stop reason: SDUPTHER

## 2020-06-10 NOTE — PROGRESS NOTES
This provider is located at [facility], [address] [place] [address], [id number] using a telephone in a secure private environment. The Patient is seen remotely at their home address in [place], using a private telephone.  The patient is unable to be seen through a MyChart Video Visit through [facility] at today's encounter because the patient was unable to download the zoom nita to complete her video visit, therefore a telephone encounter was conducted. Patient is being evaluated/treated via telehealth by telephone, and stated they are in a secure environment for this session. The patient's condition being diagnosed/treated is appropriate for telemedicine. The provider identified herself as well as her credentials.   The patient, and/or patient's guardian, consent to be seen remotely, and when consent is given they understand that the consent allows for patient identifiable information to be sent to a third party as needed.   They may refuse to be seen remotely at any time. The electronic data is encrypted and password protected, and the patient and/or guardian has been advised of the potential risks to privacy not withstanding such measures.    You have chosen to receive care through a telephone visit. Do you consent to use a telephone visit for your medical care today? Yes  This visit has been rescheduled as a phone visit to comply with patient safety concerns in accordance with CDC recommendations.      Subjective   Beverley Tovar is a 40 y.o. female who presents today for follow up    Chief Complaint:  Depression, anxiety, and insomnia    History of Present Illness:  Accompanied by: The patient reports she is alone at today's visit  The patient states that she has been doing okay since her last encounter with this APRN.  The patient states that she did have her  on May 20, last month.  She states that she had a little girl, named Pao, who she still plans to give over to her sister and brother-in-law.   "She states that she is still living with her sister and brother-in-law with Pao.  She states that they have not done any legal paperwork yet for the sister and brother-in-law to be able to adopt Pao due to the recent coronavirus pandemic, but she plans to soon.  The patient states that she decided herself to restart her Wellbutrin 75 mg by mouth once daily in the last couple of weeks.  She states that since she restarted the Wellbutrin she feels like her moods are doing better.  The patient states that she did have one \"snag\" or \"freak out\" last week.  She states it lasted for several hours, but not even half the day.  She states that she ended up calling her counselor/therapist, Maninder, which helped.  She states that she was upset that the baby was sleeping in the bed with her sister, she does not feel like that is safe.  She states it was about 6:00 am in the morning and she had texted her sister to let her know that she would help watch the baby while the sister slept some if needed.  She states the sister's response was a little bit snappy, or the tone did not appear nice, and she got very upset and she states her thoughts went out of control.  She states that her thoughts started racing to things such as that she should leave and go back to [place], that nobody wants her there, that she is useless, and that she should just take her baby back and leave.  She states she almost considered taking her baby back from the sister, but decided against it later on that day.  She states after talking with her counselor/therapist this calm her down.  She rates her symptoms of depression at a 3 out of 10 on a 0-10 scale with 10 being the worst.  She rates her symptoms of anxiety at a 8-9 out of 10 on a 0-10 scale with 10 being the worst.  She states her sleep has not been too good as of late.  She states that she will not fall asleep until 2 or 3:00 in the morning and then does not get up until around 8 AM.  She " states that she has had a couple of nightmares, but she is unable to remember what the dream was about.  She also reports her appetite is decreased.  She denies any other changes in her medical history since her last encounter with his APRN.  She denies any current medication side effects or abnormal musculoskeletal movements.  She denies any auditory or visual hallucinations.  She denies any suicidal or homicidal ideations, plans, or intent and is convincing.  She does not exhibit any symptoms of hypomania, laverne, or psychosis at today's encounter.      Last Menstrual Period:  The patient reports that she had a  and a tubal on May 20, 2020.  She states that she is still bleeding post pregnancy and .  The patient was educated that her prescribed medications can have potential risk to a developing fetus. The patient is advised to contact this APRN/this office if she becomes pregnant or plans to become pregnant.  Pt verbalizes understanding and acknowledged agreement with this plan in her own words.        The following portions of the patient's history were reviewed and updated as appropriate: allergies, current medications, past family history, past medical history, past social history, past surgical history and problem list.      Past Medical History:  Past Medical History:   Diagnosis Date   • Hepatitis C    • History of alcohol abuse    • History of drug abuse (CMS/HCC)    • Liver disease        Social History:  Social History     Socioeconomic History   • Marital status: Single     Spouse name: Not on file   • Number of children: Not on file   • Years of education: Not on file   • Highest education level: Not on file   Tobacco Use   • Smoking status: Heavy Tobacco Smoker     Packs/day: 0.50     Types: Cigarettes   • Smokeless tobacco: Never Used   Substance and Sexual Activity   • Alcohol use: No   • Drug use: Not Currently     Frequency: 7.0 times per week     Types: Heroin   • Sexual  activity: Not Currently       Family History:  Family History   Problem Relation Age of Onset   • Depression Mother    • Depression Sister        Past Surgical History:  Past Surgical History:   Procedure Laterality Date   • ANKLE OPEN REDUCTION INTERNAL FIXATION Right 2018    Procedure: RIGHT ANKLE OPEN REDUCTION INTERNAL FIXATION;  Surgeon: Hira Jackson MD;  Location: [facility];  Service:    •  SECTION     • EYE SURGERY     • TUBAL ABDOMINAL LIGATION         Problem List:  Patient Active Problem List   Diagnosis   • Bimalleolar fracture, right, closed, initial encounter   • Pain, acute due to trauma   • Drug abuse, IV (CMS/HCC)   • Overdose of heroin (CMS/[facility])       Allergy:   No Known Allergies     Current Medications:   Current Outpatient Medications   Medication Sig Dispense Refill   • buPROPion (WELLBUTRIN) 75 MG tablet Take 1 tablet by mouth Every Morning. 30 tablet 0   • Prenatal Vit-Fe Fumarate-FA (PRENATAL, CLASSIC, VITAMIN) 28-0.8 MG tablet tablet Take 1 tablet by mouth Daily.       No current facility-administered medications for this visit.        Review of Symptoms:    Review of Systems   Constitutional: Positive for fatigue.   HENT: Negative.    Eyes: Negative.    Respiratory: Negative.    Cardiovascular: Negative.    Gastrointestinal: Negative.    Endocrine: Negative.    Genitourinary: Negative.    Musculoskeletal: Negative.    Skin: Negative.    Neurological: Negative.    Psychiatric/Behavioral: Positive for agitation, decreased concentration, sleep disturbance, depressed mood and stress. Negative for behavioral problems, dysphoric mood, hallucinations, self-injury, suicidal ideas and negative for hyperactivity. The patient is nervous/anxious.          Physical Exam:   Physical Exam   Constitutional: She is oriented to person, place, and time.   Neurological: She is alert and oriented to person, place, and time.   Psychiatric: Her speech is normal. She expresses no homicidal and no  suicidal ideation. She expresses no suicidal plans and no homicidal plans.         not currently breastfeeding. There is no height or weight on file to calculate BMI.  Due to extenuating circumstances and possible current health risks associated with the patient being present in a clinical setting (with current health restrictions in place in regards to possible COVID 19 transmission/exposure), the patient was seen remotely today via a telephone encounter.  Unable to obtain vital signs due to nature of remote visit.  Height stated at 70 inches.  Weight stated at 280 pounds.         Mental Status Exam:   Hygiene:   Unable to evaluate at today's visit due to type of visit/encounter -telephone visit/encounter  Cooperation:  Cooperative  Eye Contact:  Unable to evaluate at today's visit due to type of visit/encounter -telephone visit/encounter  Psychomotor Behavior:  Unable to evaluate at today's visit due to type of visit/encounter -telephone visit/encounter  Affect:  Unable to evaluate at today's visit due to type of visit/encounter -telephone visit/encounter  Mood: normal  Hopelessness: Denies  Speech:  Normal  Thought Process:  Linear  Thought Content:  Normal and Mood congruent  Suicidal:  None  Homicidal:  None  Hallucinations:  None  Delusion:  None  Memory:  Intact  Orientation:  Person, Place, Time and Situation  Reliability:  fair  Insight:  Fair  Judgement:  Fair  Impulse Control:  Fair  Physical/Medical Issues:  No        Lab Results:   No recent labs for review.      PHQ-2 Depression Screening  Little interest or pleasure in doing things? 1   Feeling down, depressed, or hopeless? 1   PHQ-2 Total Score 2       [facility] request number [id number] reviewed by this APRN at today's encounter.      Assessment/Plan   Problems Addressed this Visit     None      Visit Diagnoses     Moderate episode of recurrent major depressive disorder (CMS/HCC)    -  Primary    Relevant Medications    buPROPion (WELLBUTRIN) 75 MG  tablet    Generalized anxiety disorder        Relevant Medications    buPROPion (WELLBUTRIN) 75 MG tablet    Sleeping difficulties              Visit Diagnoses:    ICD-10-CM ICD-9-CM   1. Moderate episode of recurrent major depressive disorder (CMS/[facility]) F33.1 296.32   2. Generalized anxiety disorder F41.1 300.02   3. Sleeping difficulties G47.9 780.50         GOALS:  Short Term Goals: Patient will be compliant with therapy.  Patient will be engaged in psychotherapy as indicated.  Patient will report subjective improvement of symptoms.  Long term goals: To stabilize mood and treat/improve subjective symptoms, the patient will stay out of the hospital, the patient will be at an optimal level of functioning, and the patient will take all medications as prescribed when they are prescribed.  The patient/guardian verbalized understanding and agreement with goals that were mutually set.      TREATMENT PLAN: Continue supportive psychotherapy efforts as indicated.  Continue Wellbutrin 75 mg by mouth once daily in the morning for mood, the patient had already restarted her Wellbutrin before meeting with this APRN.  The patient is advised not to make any medication changes without discussion with a provider due to possible risks and side effects..  Pharmacological and Non-Pharmacological treatment options discussed during today's visit, including off label usage of medication. Patient acknowledged and verbally consented with current treatment plan and was educated on the importance of compliance with treatment and follow-up appointments.        MEDICATION ISSUES:  Discussed medication options and treatment plan of prescribed medication, any off label use of medication, as well as the risks, benefits, any black box warnings including increased suicidality, and side effects including but not limited to potential falls, dizziness, possible impaired driving, GI side effects (change in appetite, abdominal discomfort, nausea,  vomiting, diarrhea, and/or constipation), dry mouth, somnolence, sedation, insomnia, activation, agitation, irritation, tremors, abnormal muscle movements or disorders, headache, sweating, possible bruising or rare bleeding, electrolyte and/or fluid abnormalities, change in blood pressure/heart rate/and or heart rhythm, sexual dysfunction, and metabolic adversities among others. Patient and/or guardian agreeable to call the office with any worsening of symptoms or onset of side effects, or if any concerns or questions arise.  The contact information for the office is made available to the patient and/or guardian.  Patient and/or guardian agreeable to call 911 or go to the nearest ER should they begin having any SI/HI, or if any urgent concerns arise. No medication side effects or related complaints today.      SUICIDE RISK ASSESSMENT: Unalterable demographics and a history of mental health intervention indicate this patient is in a high risk category compared to the general population. At present, the patient denies active SI/HI, intentions, or plans at this time and agrees to seek immediate care should such thoughts develop. The patient verbalizes understanding of how to access emergency care if needed and agrees to do so. Consideration of suicide risk and protective factors such as history, current presentation, individual strengths and weaknesses, psychosocial and environmental stressors and variables, psychiatric illness and symptoms, medical conditions and pain, took place in this interview. Based on those considerations, the patient is determined: within individual baseline and presenting no imminent risk for suicide or homicide. Other recommendations: The patient does not meet the criteria for inpatient admission and is not a safety risk to self or others at today's visit. Inpatient treatment offers no significant advantages over outpatient treatment for this patient at today's visit.      SAFETY  PLAN:  Patient was given ample time for questions and fully participated in treatment planning.  Patient was encouraged to call the clinic with any questions or concerns.  Patient was informed of access to emergency care. If patient were to develop any significant symptomatology, suicidal ideation, homicidal ideation, any concerns, or feel unsafe at any time they are to call the clinic and if unable to get immediate assistance should immediately call 911 or go to the nearest emergency room.  The patient is advised to remove or secure (lock away) all lethal weapons (including guns) and sharps (including razors, scissors, knives, etc.).  All medications (including any prescribed and any over the counter medications) should be stored in a safe and secured location that is not obtainable by children/adolescents.  Patient was given an opportunity and encouraged to ask questions about their medication, illness, and treatment. Patient contracted verbally for the following: If you are experiencing an emotional crisis or have thoughts of harming yourself or others, please go to your nearest local emergency room or call 911. Will continue to re-assess medication response and side effects frequently to establish efficacy and ensure safety. Risks, any black box warnings, side effects, off label usage, and benefits of medication and treatment discussed with patient, along with potential adverse side effects of current and/or newly prescribed medication, alternative treatment options, and OTC medications.  Patient verbalized understanding of potential risks, any off label use of medication, any black box warnings, and any side effects in their own words. The patient verbalized understanding and agreed to comply with the safety plan discussed in their own words.  Patient given the number to the office. Number also available to the 24- hour suicide hotline.      MEDS ORDERED DURING VISIT:  New Medications Ordered This Visit    Medications   • buPROPion (WELLBUTRIN) 75 MG tablet     Sig: Take 1 tablet by mouth Every Morning.     Dispense:  30 tablet     Refill:  0       Return in about 4 weeks (around 7/8/2020), or if symptoms worsen or fail to improve, for Recheck.       Progress toward goal: Not at goal    Functional Status: Moderate impairment     Prognosis: Guarded with Ongoing Treatment     Treatment plan completed 2/24/20.            This document has been electronically signed by DANDRE Wilson  Radha 10, 2020 14:09    Please note that portions of this note were completed with a voice recognition program. Efforts were made to edit dictation, but occasionally words are mistranscribed.

## 2020-07-08 ENCOUNTER — TELEMEDICINE (OUTPATIENT)
Dept: PSYCHIATRY | Facility: CLINIC | Age: 40
End: 2020-07-08

## 2020-07-08 DIAGNOSIS — F41.1 GENERALIZED ANXIETY DISORDER: ICD-10-CM

## 2020-07-08 DIAGNOSIS — F33.1 MODERATE EPISODE OF RECURRENT MAJOR DEPRESSIVE DISORDER (HCC): Primary | ICD-10-CM

## 2020-07-08 DIAGNOSIS — G47.9 SLEEPING DIFFICULTIES: ICD-10-CM

## 2020-07-08 PROCEDURE — 99213 OFFICE O/P EST LOW 20 MIN: CPT | Performed by: NURSE PRACTITIONER

## 2020-07-08 RX ORDER — BUPROPION HYDROCHLORIDE 75 MG/1
75 TABLET ORAL EVERY MORNING
Qty: 30 TABLET | Refills: 0 | Status: SHIPPED | OUTPATIENT
Start: 2020-07-08 | End: 2020-08-24 | Stop reason: SDUPTHER

## 2020-07-08 NOTE — PROGRESS NOTES
"This provider is located at [facility], [address] [place] [address], [facility], [id number] using a telephone in a secure private environment. The Patient is seen remotely at their home address in [place], using a private telephone.  The patient is unable to be seen through a MyChart Video Visit through [facility] at today's encounter because the patient was unable to download the zoom nita to complete her video visit, therefore a telephone encounter was conducted. Patient is being evaluated/treated via telehealth by telephone, and stated they are in a secure environment for this session. The patient's condition being diagnosed/treated is appropriate for telemedicine. The provider identified herself as well as her credentials.   The patient, and/or patient's guardian, consent to be seen remotely, and when consent is given they understand that the consent allows for patient identifiable information to be sent to a third party as needed.   They may refuse to be seen remotely at any time. The electronic data is encrypted and password protected, and the patient and/or guardian has been advised of the potential risks to privacy not withstanding such measures.    You have chosen to receive care through a telephone visit. Do you consent to use a telephone visit for your medical care today? Yes  This visit has been rescheduled as a phone visit to comply with patient safety concerns in accordance with CDC recommendations. Total time of discussion was 15 minutes.      Subjective   Beverley Tovar is a 40 y.o. female who presents today for follow up    Chief Complaint:  Depression, anxiety, and insomnia    History of Present Illness:  Accompanied by: The patient reports she is alone at today's visit  The patient states that her moods have been \"okay\" since her last encounter with his APRN.  The patient states that her depressive symptoms have actually improved.  The patient rates his symptoms of depression at a 1-2 out of 10 on a 0-10 scale " with 10 being the worst.  The patient rates her symptoms of anxiety at an 8 out of 10 on a 0-10 scale with 10 being the worst.  The patient states she feels like her mind is just always running.  She states her sleep has actually improved with the use of a white noise nita on her phone.  She states she sets the white noise to the sound of a rainstorm, and she focuses on the sound of the rain and storm in order to slow her mind down and fall asleep, and she states that this helps her sleep.  She states that she has had a couple of nightmares since her last encounter with this APRN.  But none in the last couple of weeks.  She states she can not even remember what her nightmares were about at this time.  She states her appetite has been good.  She denies any other changes in her medical history since her last encounter with this APRN.  She reports compliance with her current medication regimen.  She denies any known medication side effects.  She denies any auditory or visual hallucinations.  She denies any suicidal or homicidal ideations, plans, or intent at today's encounter and is convincing.  She does not exhibit any symptoms of hypomania, laverne, or psychosis at today's encounter.  She reports that she is still living with her sister and brother-in-law, who adopted the baby that she recently had.  She states that this has not caused any increased stress, and things are going well at home right now.      Last Menstrual Period:  The patient reports she is currently menstruating.  The patient was educated that her prescribed medications can have potential risk to a developing fetus. The patient is advised to contact this APRN/this office if she becomes pregnant or plans to become pregnant.  Pt verbalizes understanding and acknowledged agreement with this plan in her own words.        The following portions of the patient's history were reviewed and updated as appropriate: allergies, current medications, past family  history, past medical history, past social history, past surgical history and problem list.        Past Medical History:  Past Medical History:   Diagnosis Date   • Hepatitis C    • History of alcohol abuse    • History of drug abuse (CMS/HCC)    • Liver disease        Social History:  Social History     Socioeconomic History   • Marital status: Single     Spouse name: Not on file   • Number of children: Not on file   • Years of education: Not on file   • Highest education level: Not on file   Tobacco Use   • Smoking status: Heavy Tobacco Smoker     Packs/day: 0.50     Types: Cigarettes   • Smokeless tobacco: Never Used   Substance and Sexual Activity   • Alcohol use: No   • Drug use: Not Currently     Frequency: 7.0 times per week     Types: Heroin   • Sexual activity: Not Currently       Family History:  Family History   Problem Relation Age of Onset   • Depression Mother    • Depression Sister        Past Surgical History:  Past Surgical History:   Procedure Laterality Date   • ANKLE OPEN REDUCTION INTERNAL FIXATION Right 2018    Procedure: RIGHT ANKLE OPEN REDUCTION INTERNAL FIXATION;  Surgeon: Hira Jackson MD;  Location: [facility];  Service:    •  SECTION     • EYE SURGERY     • TUBAL ABDOMINAL LIGATION         Problem List:  Patient Active Problem List   Diagnosis   • Bimalleolar fracture, right, closed, initial encounter   • Pain, acute due to trauma   • Drug abuse, IV (CMS/HCC)   • Overdose of heroin (CMS/HCC)       Allergy:   No Known Allergies     Current Medications:   Current Outpatient Medications   Medication Sig Dispense Refill   • buPROPion (WELLBUTRIN) 75 MG tablet Take 1 tablet by mouth Every Morning. 30 tablet 0   • Prenatal Vit-Fe Fumarate-FA (PRENATAL, CLASSIC, VITAMIN) 28-0.8 MG tablet tablet Take 1 tablet by mouth Daily.       No current facility-administered medications for this visit.        Review of Symptoms:    Review of Systems   Constitutional: Negative.    HENT:  Negative.    Eyes: Negative.    Respiratory: Negative.    Cardiovascular: Negative.    Gastrointestinal: Negative.    Endocrine: Negative.    Genitourinary: Negative.    Musculoskeletal: Negative.    Skin: Negative.    Neurological: Negative.    Psychiatric/Behavioral: Positive for agitation, decreased concentration, sleep disturbance, depressed mood and stress. Negative for behavioral problems, dysphoric mood, hallucinations, self-injury, suicidal ideas and negative for hyperactivity. The patient is nervous/anxious.          Physical Exam:   Physical Exam   Constitutional: She is oriented to person, place, and time.   Neurological: She is alert and oriented to person, place, and time.   Psychiatric: Her speech is normal. She expresses no homicidal and no suicidal ideation. She expresses no suicidal plans and no homicidal plans.         not currently breastfeeding. There is no height or weight on file to calculate BMI.  Due to extenuating circumstances and possible current health risks associated with the patient being present in a clinical setting (with current health restrictions in place in regards to possible COVID 19 transmission/exposure), the patient was seen remotely today via a telephone encounter.  Unable to obtain vital signs due to nature of remote visit.  Height stated at 70 inches.  Weight stated at 280 pounds.         Mental Status Exam:   Hygiene:   Unable to evaluate at today's visit due to type of visit/encounter -telephone visit/encounter  Cooperation:  Cooperative  Eye Contact:  Unable to evaluate at today's visit due to type of visit/encounter -telephone visit/encounter  Psychomotor Behavior:  Unable to evaluate at today's visit due to type of visit/encounter -telephone visit/encounter  Affect:  Unable to evaluate at today's visit due to type of visit/encounter -telephone visit/encounter  Mood: normal  Hopelessness: Denies  Speech:  Normal  Thought Process:  Linear  Thought Content:   Normal  Suicidal:  None  Homicidal:  None  Hallucinations:  None  Delusion:  None  Memory:  Intact  Orientation:  Person, Place, Time and Situation  Reliability:  fair  Insight:  Fair  Judgement:  Fair  Impulse Control:  Fair  Physical/Medical Issues:  No        Lab Results:   No recent labs for review.      PHQ-2 Depression Screening  Little interest or pleasure in doing things? 1   Feeling down, depressed, or hopeless? 1   PHQ-2 Total Score 2         Assessment/Plan   Problems Addressed this Visit     None      Visit Diagnoses     Moderate episode of recurrent major depressive disorder (CMS/HCC)    -  Primary    Relevant Medications    buPROPion (WELLBUTRIN) 75 MG tablet    Generalized anxiety disorder        Relevant Medications    buPROPion (WELLBUTRIN) 75 MG tablet    Sleeping difficulties              Visit Diagnoses:    ICD-10-CM ICD-9-CM   1. Moderate episode of recurrent major depressive disorder (CMS/HCC) F33.1 296.32   2. Generalized anxiety disorder F41.1 300.02   3. Sleeping difficulties G47.9 780.50         GOALS:  Short Term Goals: Patient will be compliant with therapy.  Patient will be engaged in psychotherapy as indicated.  Patient will report subjective improvement of symptoms.  Long term goals: To stabilize mood and treat/improve subjective symptoms, the patient will stay out of the hospital, the patient will be at an optimal level of functioning, and the patient will take all medications as prescribed when they are prescribed.  The patient verbalized understanding and agreement with goals that were mutually set.      TREATMENT PLAN: Continue supportive psychotherapy efforts as indicated.  Continue Wellbutrin 75 mg by mouth once daily in the morning for mood.  Pharmacological and Non-Pharmacological treatment options discussed during today's visit, including off label usage of medication. Patient acknowledged and verbally consented with current treatment plan and was educated on the importance of  compliance with treatment and follow-up appointments.        MEDICATION ISSUES:  Discussed medication options and treatment plan of prescribed medication, any off label use of medication, as well as the risks, benefits, any black box warnings including increased suicidality, and side effects including but not limited to potential falls, dizziness, possible impaired driving, GI side effects (change in appetite, abdominal discomfort, nausea, vomiting, diarrhea, and/or constipation), dry mouth, somnolence, sedation, insomnia, activation, agitation, irritation, tremors, abnormal muscle movements or disorders, headache, sweating, possible bruising or rare bleeding, electrolyte and/or fluid abnormalities, change in blood pressure/heart rate/and or heart rhythm, sexual dysfunction, and metabolic adversities among others. Patient and/or guardian agreeable to call the office with any worsening of symptoms or onset of side effects, or if any concerns or questions arise.  The contact information for the office is made available to the patient and/or guardian.  Patient and/or guardian agreeable to call 911 or go to the nearest ER should they begin having any SI/HI, or if any urgent concerns arise. No medication side effects or related complaints today.      SUICIDE RISK ASSESSMENT: Unalterable demographics and a history of mental health intervention indicate this patient is in a high risk category compared to the general population. At present, the patient denies active SI/HI, intentions, or plans at this time and agrees to seek immediate care should such thoughts develop. The patient verbalizes understanding of how to access emergency care if needed and agrees to do so. Consideration of suicide risk and protective factors such as history, current presentation, individual strengths and weaknesses, psychosocial and environmental stressors and variables, psychiatric illness and symptoms, medical conditions and pain, took place in  this interview. Based on those considerations, the patient is determined: within individual baseline and presenting no imminent risk for suicide or homicide. Other recommendations: The patient does not meet the criteria for inpatient admission and is not a safety risk to self or others at today's visit. Inpatient treatment offers no significant advantages over outpatient treatment for this patient at today's visit.      SAFETY PLAN:  Patient was given ample time for questions and fully participated in treatment planning.  Patient was encouraged to call the clinic with any questions or concerns.  Patient was informed of access to emergency care. If patient were to develop any significant symptomatology, suicidal ideation, homicidal ideation, any concerns, or feel unsafe at any time they are to call the clinic and if unable to get immediate assistance should immediately call 911 or go to the nearest emergency room.  The patient is advised to remove or secure (lock away) all lethal weapons (including guns) and sharps (including razors, scissors, knives, etc.).  All medications (including any prescribed and any over the counter medications) should be stored in a safe and secured location that is not obtainable by children/adolescents.  Patient was given an opportunity and encouraged to ask questions about their medication, illness, and treatment. Patient contracted verbally for the following: If you are experiencing an emotional crisis or have thoughts of harming yourself or others, please go to your nearest local emergency room or call 911. Will continue to re-assess medication response and side effects frequently to establish efficacy and ensure safety. Risks, any black box warnings, side effects, off label usage, and benefits of medication and treatment discussed with patient, along with potential adverse side effects of current and/or newly prescribed medication, alternative treatment options, and OTC medications.   Patient verbalized understanding of potential risks, any off label use of medication, any black box warnings, and any side effects in their own words. The patient verbalized understanding and agreed to comply with the safety plan discussed in their own words.  Patient given the number to the office. Number also available to the 24- hour suicide hotline.        MEDS ORDERED DURING VISIT:  New Medications Ordered This Visit   Medications   • buPROPion (WELLBUTRIN) 75 MG tablet     Sig: Take 1 tablet by mouth Every Morning.     Dispense:  30 tablet     Refill:  0       Return in about 8 weeks (around 9/2/2020), or if symptoms worsen or fail to improve, for Recheck.       Progress toward goal: Not at goal    Functional Status: Mild impairment     Prognosis: Guarded with Ongoing Treatment     Treatment plan completed 2/24/20.            This document has been electronically signed by DANDRE Rollins  July 8, 2020 13:55    Please note that portions of this note were completed with a voice recognition program. Efforts were made to edit dictation, but occasionally words are mistranscribed.

## 2020-08-24 DIAGNOSIS — F33.1 MODERATE EPISODE OF RECURRENT MAJOR DEPRESSIVE DISORDER (HCC): ICD-10-CM

## 2020-08-24 DIAGNOSIS — F41.1 GENERALIZED ANXIETY DISORDER: ICD-10-CM

## 2020-08-24 RX ORDER — BUPROPION HYDROCHLORIDE 75 MG/1
75 TABLET ORAL EVERY MORNING
Qty: 30 TABLET | Refills: 0 | Status: SHIPPED | OUTPATIENT
Start: 2020-08-24 | End: 2020-09-02 | Stop reason: DRUGHIGH

## 2020-09-02 ENCOUNTER — TELEMEDICINE (OUTPATIENT)
Dept: PSYCHIATRY | Facility: CLINIC | Age: 40
End: 2020-09-02

## 2020-09-02 DIAGNOSIS — F41.1 GENERALIZED ANXIETY DISORDER: ICD-10-CM

## 2020-09-02 DIAGNOSIS — F33.1 MODERATE EPISODE OF RECURRENT MAJOR DEPRESSIVE DISORDER (HCC): Primary | ICD-10-CM

## 2020-09-02 DIAGNOSIS — F19.10 SUBSTANCE ABUSE (HCC): ICD-10-CM

## 2020-09-02 DIAGNOSIS — F12.90 MARIJUANA USE: ICD-10-CM

## 2020-09-02 DIAGNOSIS — G47.9 SLEEPING DIFFICULTIES: ICD-10-CM

## 2020-09-02 PROCEDURE — 90833 PSYTX W PT W E/M 30 MIN: CPT | Performed by: NURSE PRACTITIONER

## 2020-09-02 PROCEDURE — 99214 OFFICE O/P EST MOD 30 MIN: CPT | Performed by: NURSE PRACTITIONER

## 2020-09-02 RX ORDER — ARIPIPRAZOLE 2 MG/1
2 TABLET ORAL DAILY
Qty: 30 TABLET | Refills: 0 | Status: SHIPPED | OUTPATIENT
Start: 2020-09-02

## 2020-09-02 RX ORDER — BUPROPION HYDROCHLORIDE 150 MG/1
150 TABLET ORAL EVERY MORNING
Qty: 30 TABLET | Refills: 0 | Status: SHIPPED | OUTPATIENT
Start: 2020-09-02

## 2020-09-02 NOTE — PROGRESS NOTES
"This provider is located at the [facility] (through [facility]), [address], [place], [address] using a secure [employer] Video Visit through [employer]. Patient is being seen remotely via telehealth at their home address in [place], and stated they are in a secure environment for this session. The patient's condition being diagnosed/treated is appropriate for telemedicine. The provider identified herself as well as her credentials.   The patient, and/or patients guardian, consent to be seen remotely, and when consent is given they understand that the consent allows for patient identifiable information to be sent to a third party as needed.   They may refuse to be seen remotely at any time. The electronic data is encrypted and password protected, and the patient and/or guardian has been advised of the potential risks to privacy not withstanding such measures.    You have chosen to receive care through a telehealth visit.  Do you consent to use a video/audio connection for your medical care today? Yes      Subjective   Beverley Tovar is a 40 y.o. female who presents today for follow up    Chief Complaint:  Depression, anxiety, insomnia, reported daily marijuana use, and recent reported heroin use    History of Present Illness:  Accompanied by: The patient reports she is alone at today's visit  The patient states that she has not been doing too good since her last encounter with his APRN.  The patient states her moods have not been good, and her depression is \"back with a vengeance\".  The patient rates her symptoms of depression at an 8 out of 10 on a 0-to-10 scale with 10 being the worst.  She states when she is depressed she feels down, she feels sad, her body feels heavy and aches and hurts, her brain is constantly running, she is constantly having thoughts of wanting to use heroin, and she has intrusive thoughts.  She states those intrusive thoughts include \"I wish I " "was dead\", but states she has no suicidal thoughts.  She states she has previously had SI in the past, before seeking psychiatric treatment, and in her past suicidal thoughts she used to fantasize daily about how she would commit suicide.  The patient adamantly denies any suicidal or homicidal ideations, plans, or intent, and states she does not want to hurt herself, she states there are just intrusive thoughts there.  The patient rates her symptoms of anxiety at a 10 out of 10 on a 0-to-10 scale with 10 being the worst.  She states she worries, she feels anxious, she is always on edge, and she is always craving heroin.  This APRN has discussed at length some of the available treatments for heroin abuse, and the patient states she had previously considered Suboxone treatment but refuses to.  She states she has only experienced Suboxone off the street.  She states she typically buys 3 to 4 days of Suboxone to help her come off heroin every time she relapses, and she does not like how Suboxone makes her feel.  She states any time she has been in rehab or nursing home she has seen other females go through severe withdrawal from Suboxone, and she states that withdrawal from Suboxone is worse than heroin, and she would rather deal with the heroin withdrawal symptoms.  The patient states she relapsed using heroin \"the whole month of July\".  She states she was using heroin daily, and sometimes multiple times per day.  She states her sister is actually the one that caught her, and then she asked her sister for help.  She states that she had been stealing money from her sister, as well as for her sister's bank account.  She states her sister gave her some money to buy Suboxone off the street, she got about 3 days worth of Suboxone, and took that to help get off the heroin.  She states she has been clean from heroin for approximately 1 month.  The patient does report that she smokes marijuana daily.  The patient states she smokes " "marijuana every day at night, and sometimes during the day when she needs her nerves and anxiety to calm down.  The patient states she is having constant daily cravings of wanting heroin.  The patient states her sister has money in her purse at the house, and having many around is making her want heroin more because she knows how easy it would be to go steal that many.  She states she is going to talk with her sister later about hiding her money from the patient so she does not have those triggers and worsening of her cravings for heroin.  The patient states when she used heroin it has always made her have more energy, she states she can get stuff done around the house without any difficulty, she states it makes her thoughts go in order and not be jumbled, and she states \"I feel normal\", she states she feels like what she thinks a normal person should feel like.  She states that is the problem with heroin for her, the way it makes her feel somewhat normal.  The patient states she went last Tuesday to the 's office and signed paperwork to give her sister custody of her daughter that she recently gave birth to.  She states she knows that she made the right decision, but there was a clause that she has 30 days to change her mind, and she has been thinking about this daily ever since then.  She states she has not been sleeping much at all, but she denies any nightmares.  She states she feels like she only has \"cat naps\" at night.  She states her appetite is significantly decreased due to her recent heroin use.  She states it takes about a month after using heroin for her to get back up to her normal, and she feels like she is getting there.  She denies any changes in her medical history since her last encounter with this APRN, other than previously described.  She denies any current known side effects from her Wellbutrin.  She denies any auditory or visual hallucinations.  Again, the patient adamantly denies any " suicidal or homicidal ideation, plan, or intent at today's encounter and is convincing.  The patient states that she would like to increase her Wellbutrin at today's encounter.  The patient also states that she would like to try adding Abilify for depression.  Patient states that she has a very good friend who is also a heroin addict, who has been sober for quite some time now, and that friend has expressed to her how much a small dose of Abilify helped her mood significantly, it helped her cravings, and it helped her depression enough to where she does not want heroin all the time.  She states her depression is very bad right now she would like to consider adding Abilify to her increased Wellbutrin dosage if at all possible.  Again, the patient refuses any treatment for heroin use/abuse/withdrawal at this time.      Last Menstrual Period:  The patient reports she is currently menstruating.  The patient was educated that her prescribed medications can have potential risk to a developing fetus. The patient is advised to contact this APRN/this office if she becomes pregnant or plans to become pregnant.  Pt verbalizes understanding and acknowledged agreement with this plan in her own words.        The following portions of the patient's history were reviewed and updated as appropriate: allergies, current medications, past family history, past medical history, past social history, past surgical history and problem list.        Past Medical History:  Past Medical History:   Diagnosis Date   • Hepatitis C    • History of alcohol abuse    • History of drug abuse (CMS/HCC)    • Liver disease        Social History:  Social History     Socioeconomic History   • Marital status: Single     Spouse name: Not on file   • Number of children: Not on file   • Years of education: Not on file   • Highest education level: Not on file   Tobacco Use   • Smoking status: Heavy Tobacco Smoker     Packs/day: 0.50     Types: Cigarettes   •  Smokeless tobacco: Never Used   Substance and Sexual Activity   • Alcohol use: Not Currently   • Drug use: Yes     Frequency: 7.0 times per week     Types: Heroin, Marijuana   • Sexual activity: Not Currently       Family History:  Family History   Problem Relation Age of Onset   • Depression Mother    • Depression Sister        Past Surgical History:  Past Surgical History:   Procedure Laterality Date   • ANKLE OPEN REDUCTION INTERNAL FIXATION Right 2018    Procedure: RIGHT ANKLE OPEN REDUCTION INTERNAL FIXATION;  Surgeon: Hira Jackson MD;  Location: [facility];  Service:    •  SECTION     • EYE SURGERY     • TUBAL ABDOMINAL LIGATION         Problem List:  Patient Active Problem List   Diagnosis   • Bimalleolar fracture, right, closed, initial encounter   • Pain, acute due to trauma   • Drug abuse, IV (CMS/[facility])   • Overdose of heroin (CMS/[facility])       Allergy:   No Known Allergies     Current Medications:   Current Outpatient Medications   Medication Sig Dispense Refill   • ARIPiprazole (Abilify) 2 MG tablet Take 1 tablet by mouth Daily. 30 tablet 0   • buPROPion XL (Wellbutrin XL) 150 MG 24 hr tablet Take 1 tablet by mouth Every Morning. 30 tablet 0   • Prenatal Vit-Fe Fumarate-FA (PRENATAL, CLASSIC, VITAMIN) 28-0.8 MG tablet tablet Take 1 tablet by mouth Daily.       No current facility-administered medications for this visit.        Review of Symptoms:    Review of Systems   Constitutional: Negative.    HENT: Negative.    Eyes: Negative.    Respiratory: Negative.    Cardiovascular: Negative.    Gastrointestinal: Negative.    Endocrine: Negative.    Genitourinary: Negative.    Musculoskeletal: Negative.    Skin: Negative.    Neurological: Negative.    Psychiatric/Behavioral: Positive for agitation, behavioral problems, decreased concentration, sleep disturbance, depressed mood and stress. Negative for dysphoric mood, hallucinations, self-injury, suicidal ideas and negative for hyperactivity. The  patient is nervous/anxious.          Physical Exam:   Physical Exam   Constitutional: She is oriented to person, place, and time. She appears well-developed and well-nourished.   Neurological: She is alert and oriented to person, place, and time.   Psychiatric: Her speech is normal. She expresses no homicidal and no suicidal ideation. She expresses no suicidal plans and no homicidal plans.         not currently breastfeeding. There is no height or weight on file to calculate BMI.  Due to extenuating circumstances and possible current health risks associated with the patient being present in a clinical setting (with current health restrictions in place in regards to possible COVID 19 transmission/exposure), the patient was seen remotely today via a [employer] Video Visit through [facility].  Unable to obtain vital signs due to nature of remote visit.  Height stated at 70 inches.  Weight stated at 280 pounds.         Mental Status Exam:   Hygiene:   fair  Cooperation:  Cooperative  Eye Contact:  Fair  Psychomotor Behavior:  Restless  Affect:  Appropriate  Mood: anxious  Hopelessness: Denies  Speech:  Normal  Thought Process:  Linear  Thought Content:  Normal  Suicidal:  None  Homicidal:  None  Hallucinations:  None  Delusion:  None  Memory:  Intact  Orientation:  Person, Place, Time and Situation  Reliability:  fair  Insight:  Poor  Judgement:  Poor and Impaired  Impulse Control:  Poor and Impaired  Physical/Medical Issues:  No        Lab Results:   No recent labs for review.      PHQ-2 Depression Screening  Little interest or pleasure in doing things? 3   Feeling down, depressed, or hopeless? 3   PHQ-2 Total Score 6         Assessment/Plan   Problems Addressed this Visit     None      Visit Diagnoses     Moderate episode of recurrent major depressive disorder (CMS/HCC)    -  Primary    Relevant Medications    buPROPion XL (Wellbutrin XL) 150 MG 24 hr tablet    ARIPiprazole (Abilify) 2 MG tablet    Generalized anxiety disorder         Relevant Medications    buPROPion XL (Wellbutrin XL) 150 MG 24 hr tablet    ARIPiprazole (Abilify) 2 MG tablet    Sleeping difficulties        Marijuana use        Substance abuse (CMS/[facility])              Visit Diagnoses:    ICD-10-CM ICD-9-CM   1. Moderate episode of recurrent major depressive disorder (CMS/[facility]) F33.1 296.32   2. Generalized anxiety disorder F41.1 300.02   3. Sleeping difficulties G47.9 780.50   4. Marijuana use F12.90 305.20   5. Substance abuse (CMS/[facility]) F19.10 305.90         GOALS:  Short Term Goals: Patient will be compliant with therapy.  Patient will be engaged in psychotherapy as indicated.  Patient will report subjective improvement of symptoms.  Long term goals: To stabilize mood and treat/improve subjective symptoms, the patient will stay out of the hospital, the patient will be at an optimal level of functioning, and the patient will take all medications as prescribed when they are prescribed.  The patient verbalized understanding and agreement with goals that were mutually set.      TREATMENT PLAN: Continue supportive psychotherapy efforts as indicated.  Increase Wellbutrin to 150 mg XL by mouth once daily in the morning for mood.  Add Abilify 2 mg by mouth once daily as an adjunct for depression/mood.  Pharmacological and Non-Pharmacological treatment options discussed during today's visit, including off label usage of medication. Patient acknowledged and verbally consented with current treatment plan and was educated on the importance of compliance with treatment and follow-up appointments.      Patient screened positive for depression based on a PHQ-2 score of 6 on 9/2/2020. Follow-up recommendations include: Prescribed antidepressant medication treatment, Suicide Risk Assessment performed and continue psychotherapy.    I spent a total of 25 minutes in direct patient care, greater than 17 minutes (greater than 50%) were spent with assessment, coordination of care, and counseling  "the patient regarding depression, anxiety, substance use/abuse, sleeping difficulties, and answering any questions the patient had about the medication regimen and treatment plan.     In/Start Time: 1407 PM.  Out/Stop Time: 1425 PM.  18 minutes of face to face direct patient care with patient was spent for supportive psychotherapy including promoting the therapeutic alliance, strengthening self awareness and insights, strengthening coping skills, discussing relaxation techniques, counseling the patient regarding diagnoses, utilizing cognitive behavioral therapy to assist the patient in recognizing more appropriate coping mechanisms which are proven effective in reducing the severity of frequency of symptoms and and in coordination of care.  This APRN assisted the patient in processing the patient's diagnoses including depression, anxiety, sleeping difficulties, reported marijuana use, reported substance use/abuse, and also acknowledged and normalized the patient's thoughts, feelings, and concerns  The patient is also strongly urged to eat healthy well balanced foods in order to reduce further health risks, and exercise as tolerated and in guidance with the patient's PCP's recommendations. This APRN allowed the patient to freely discuss issues without interruption or judgment.  This APRN answered any questions the patient had regarding medication and treatment plan.    The patient is strongly encouraged to continue sobriety from heroin.  The patient is able to verbalize the risks of continued heroin use, including overdose and life-threatening conditions, and even death.  The patient is strongly advised to return to her AA meetings, and find a new sponsor through AA.  The patient states she previously attended AA meetings in [place], [place], but she was \"fired\" by her last sponsor due to helping another client find heroin.  The patient states between now and the next encounter she will locate and find an AA " meeting to attend, and will try to find a sponsor as well.      MEDICATION ISSUES:  Discussed medication options and treatment plan of prescribed medication, any off label use of medication, as well as the risks, benefits, any black box warnings including increased suicidality, and side effects including but not limited to potential falls, dizziness, possible impaired driving, GI side effects (change in appetite, abdominal discomfort, nausea, vomiting, diarrhea, and/or constipation), dry mouth, somnolence, sedation, insomnia, activation, agitation, irritation, tremors, abnormal muscle movements or disorders, tardive dyskinesia, akathisia, asthenia, headache, sweating, possible bruising or rare bleeding, electrolyte and/or fluid abnormalities, change in blood pressure/heart rate/and or heart rhythm, hypotension, sexual dysfunction, rare impulse control problems, rare seizures, rare neuroleptic malignant syndrome, increased risk of death and cerebrovascular events, change in blood glucose and increased risk for diabetes, change in triglycerides and cholesterol and increased risk for dyslipidemia,  weight gain, weight gain that can become problematic to health, skin conditions and reactions, and metabolic adversities among others. Patient and/or guardian are agreeable to call the office with any worsening of symptoms or onset of side effects, or if any concerns or questions arise.  The contact information for the office is made available to the patient and/or guardian. Patient and/or guardian are agreeable to call 911 or go to the nearest ER should they begin having any SI/HI, or if any urgent concerns arise. No medication side effects or related complaints today.      SUICIDE RISK ASSESSMENT: Unalterable demographics and a history of mental health intervention indicate this patient is in a high risk category compared to the general population. At present, the patient denies active SI/HI, intentions, or plans at this  time and agrees to seek immediate care should such thoughts develop. The patient verbalizes understanding of how to access emergency care if needed and agrees to do so. Consideration of suicide risk and protective factors such as history, current presentation, individual strengths and weaknesses, psychosocial and environmental stressors and variables, psychiatric illness and symptoms, medical conditions and pain, took place in this interview. Based on those considerations, the patient is determined: within individual baseline and presenting no imminent risk for suicide or homicide. Other recommendations: The patient does not meet the criteria for inpatient admission and is not a safety risk to self or others at today's visit. Inpatient treatment offers no significant advantages over outpatient treatment for this patient at today's visit.      SAFETY PLAN:  Patient was given ample time for questions and fully participated in treatment planning.  Patient was encouraged to call the clinic with any questions or concerns.  Patient was informed of access to emergency care. If patient were to develop any significant symptomatology, suicidal ideation, homicidal ideation, any concerns, or feel unsafe at any time they are to call the clinic and if unable to get immediate assistance should immediately call 911 or go to the nearest emergency room.  The patient is advised to remove or secure (lock away) all lethal weapons (including guns) and sharps (including razors, scissors, knives, etc.).  All medications (including any prescribed and any over the counter medications) should be stored in a safe and secured location that is not obtainable by children/adolescents.  Patient was given an opportunity and encouraged to ask questions about their medication, illness, and treatment. Patient contracted verbally for the following: If you are experiencing an emotional crisis or have thoughts of harming yourself or others, please go to  your nearest local emergency room or call 911. Will continue to re-assess medication response and side effects frequently to establish efficacy and ensure safety. Risks, any black box warnings, side effects, off label usage, and benefits of medication and treatment discussed with patient, along with potential adverse side effects of current and/or newly prescribed medication, alternative treatment options, and OTC medications.  Patient verbalized understanding of potential risks, any off label use of medication, any black box warnings, and any side effects in their own words. The patient verbalized understanding and agreed to comply with the safety plan discussed in their own words.  Patient given the number to the office. Number also available to the 24- hour suicide hotline.        MEDS ORDERED DURING VISIT:  New Medications Ordered This Visit   Medications   • buPROPion XL (Wellbutrin XL) 150 MG 24 hr tablet     Sig: Take 1 tablet by mouth Every Morning.     Dispense:  30 tablet     Refill:  0   • ARIPiprazole (Abilify) 2 MG tablet     Sig: Take 1 tablet by mouth Daily.     Dispense:  30 tablet     Refill:  0       Return in about 2 weeks (around 9/16/2020), or if symptoms worsen or fail to improve, for Recheck.       Progress toward goal: Not at goal    Functional Status: Moderate impairment     Prognosis: Guarded with Ongoing Treatment     Treatment plan completed 2/24/20.            This document has been electronically signed by DANDRE Rollins  September 2, 2020 14:54    Please note that portions of this note were completed with a voice recognition program. Efforts were made to edit dictation, but occasionally words are mistranscribed.

## 2021-03-31 ENCOUNTER — BULK ORDERING (OUTPATIENT)
Dept: CASE MANAGEMENT | Facility: OTHER | Age: 41
End: 2021-03-31

## 2021-03-31 DIAGNOSIS — Z23 IMMUNIZATION DUE: ICD-10-CM

## 2023-08-23 ENCOUNTER — OFFICE VISIT (OUTPATIENT)
Dept: FAMILY MEDICINE CLINIC | Facility: CLINIC | Age: 43
End: 2023-08-23
Payer: COMMERCIAL

## 2023-08-23 VITALS
BODY MASS INDEX: 37.8 KG/M2 | RESPIRATION RATE: 18 BRPM | DIASTOLIC BLOOD PRESSURE: 80 MMHG | TEMPERATURE: 97.7 F | HEART RATE: 86 BPM | WEIGHT: 264 LBS | HEIGHT: 70 IN | SYSTOLIC BLOOD PRESSURE: 124 MMHG | OXYGEN SATURATION: 97 %

## 2023-08-23 DIAGNOSIS — Z76.89 ENCOUNTER TO ESTABLISH CARE: Primary | ICD-10-CM

## 2023-08-23 DIAGNOSIS — F41.9 ANXIETY: ICD-10-CM

## 2023-08-23 DIAGNOSIS — R07.9 CHEST PAIN, UNSPECIFIED TYPE: ICD-10-CM

## 2023-08-23 PROBLEM — F17.210 CIGARETTE NICOTINE DEPENDENCE WITHOUT COMPLICATION: Status: ACTIVE | Noted: 2023-08-23

## 2023-08-23 RX ORDER — BUPRENORPHINE HYDROCHLORIDE AND NALOXONE HYDROCHLORIDE DIHYDRATE 8; 2 MG/1; MG/1
1.5 TABLET SUBLINGUAL DAILY
COMMUNITY
Start: 2023-08-17

## 2023-08-23 NOTE — PROGRESS NOTES
"Chief Complaint  Establish Care (Cont. Care of Anxiety and Depression.)    Subjective        Beverley Tovar presents to [facility]  History of Present Illness  The pateint is a 42 yo female who is here to get established as a primary care patient. The patient has been diagnosed with bipolar disorder in the past but she feels like the medicine she was given before did not really helped and she stopped taking them. She goes to a suboxone clinic and sees a counselor but no one really suggested that she needs her bipolar medication. However, her anxiety has been worse lately and she gets this episodes of pain in the middle of the chest that goes down to both sides of the lower chest that started this week.    Objective   Vital Signs:  /80   Pulse 86   Temp 97.7 øF (36.5 øC) (Temporal)   Resp 18   Ht 177.8 cm (70\")   Wt 120 kg (264 lb)   SpO2 97%   BMI 37.88 kg/mý   Estimated body mass index is 37.88 kg/mý as calculated from the following:    Height as of this encounter: 177.8 cm (70\").    Weight as of this encounter: 120 kg (264 lb).             Physical Exam  Vitals and nursing note reviewed.   Constitutional:       Appearance: Normal appearance. She is obese.   HENT:      Head: Normocephalic and atraumatic.      Right Ear: Tympanic membrane and ear canal normal.      Left Ear: Tympanic membrane and ear canal normal.      Nose: Nose normal.      Mouth/Throat:      Mouth: Mucous membranes are moist.   Eyes:      Extraocular Movements: Extraocular movements intact.      Pupils: Pupils are equal, round, and reactive to light.   Cardiovascular:      Rate and Rhythm: Normal rate and regular rhythm. No extrasystoles are present.     Heart sounds: Normal heart sounds. No murmur heard.  Pulmonary:      Effort: Pulmonary effort is normal.      Breath sounds: Normal breath sounds. No decreased breath sounds, wheezing or rhonchi.   Abdominal:      General: Abdomen is protuberant. "      Palpations: Abdomen is soft. There is no mass.      Tenderness: There is no abdominal tenderness. There is no right CVA tenderness, left CVA tenderness, guarding or rebound.   Musculoskeletal:         General: Normal range of motion.      Cervical back: Normal range of motion and neck supple.      Right lower leg: No edema.      Left lower leg: No edema.   Lymphadenopathy:      Head:      Right side of head: No submental, submandibular, preauricular, posterior auricular or occipital adenopathy.      Left side of head: No submental, submandibular, preauricular, posterior auricular or occipital adenopathy.      Cervical: No cervical adenopathy.      Upper Body:      Right upper body: No supraclavicular or axillary adenopathy.      Left upper body: No supraclavicular or axillary adenopathy.   Skin:     Findings: No rash.   Neurological:      General: No focal deficit present.      Mental Status: She is alert and oriented to person, place, and time.      Cranial Nerves: Cranial nerves 2-12 are intact.      Sensory: Sensation is intact.      Motor: Motor function is intact.      Coordination: Coordination is intact.      Gait: Gait is intact.      Deep Tendon Reflexes: Reflexes are normal and symmetric.   Psychiatric:         Attention and Perception: Attention and perception normal.         Mood and Affect: Mood normal.         Speech: Speech normal.         Behavior: Behavior normal. Behavior is cooperative.         Thought Content: Thought content normal.         Cognition and Memory: Cognition normal.         Judgment: Judgment normal.      Result Review :                   Assessment and Plan   Diagnoses and all orders for this visit:    1. Encounter to establish care (Primary)    2. Chest pain, unspecified type  -     XR Chest PA & Lateral (In Office)  -     Ambulatory Referral to Cardiology    3. Anxiety  Assessment & Plan:  Will try the patient on Zoloft.    Orders:  -     sertraline (Zoloft) 50 MG tablet;  Take 1 tablet by mouth Daily for 30 days.  Dispense: 30 tablet; Refill: 0           I spent 30 minutes caring for Beverley on this date of service. This time includes time spent by me in the following activities:preparing for the visit, performing a medically appropriate examination and/or evaluation , counseling and educating the patient/family/caregiver, ordering medications, tests, or procedures, and documenting information in the medical record    Follow Up   Return in about 1 month (around 9/23/2023) for Annual physical, Blood work.  Patient was given instructions and counseling regarding her condition or for health maintenance advice. Please see specific information pulled into the AVS if appropriate.     The patient is advised to continue all of her regular medications as prescribed. She was counseled regarding the importance of diet, exercise and medication compliance.    The preventive exam has been reviewed in detail.  The patient has been fully counseled on preventative guidelines for vaccines, cancer screenings, and other health maintenance needs.   The patient has been counseled on guidelines for maintaining a lifestyle to promote good health and to minimize chronic diseases.  The patient has been assisted with scheduling these healthcare procedures for the coming year and given a written document of health maintenance and anticipatory guidance for age with the AVS.      This document has been electronically signed by Chris Bhatia MD  August 25, 2023 12:31 EDT

## 2023-08-25 ENCOUNTER — PATIENT ROUNDING (BHMG ONLY) (OUTPATIENT)
Dept: FAMILY MEDICINE CLINIC | Facility: CLINIC | Age: 43
End: 2023-08-25
Payer: COMMERCIAL

## 2023-08-25 PROBLEM — F17.210 CIGARETTE NICOTINE DEPENDENCE WITHOUT COMPLICATION: Status: RESOLVED | Noted: 2023-08-23 | Resolved: 2023-08-25

## 2023-08-25 NOTE — PROGRESS NOTES
August 25, 2023    Hello, may I speak with Beverley Tovar?    My name is Jacob Mancera    I am  with [facility]  [address] [address] [phone].    Before we get started may I verify your date of birth? 1980    I am calling to officially welcome you to our practice and ask about your recent visit. Is this a good time to talk?     yes    Tell me about your visit with us. What things went well?      Patient said that she usually does not like or want to go to her doctor appointments because she feels that they do not listen to her. She said Dr. Bhatia took his time and seemed to really listen to what she had to say.       We're always looking for ways to make our patients' experiences even better. Do you have recommendations on ways we may improve?      no    Overall were you satisfied with your first visit to our practice?     yes       I appreciate you taking the time to speak with me today. Is there anything else I can do for you?     no      Thank you, and have a great day.

## 2023-09-20 ENCOUNTER — OFFICE VISIT (OUTPATIENT)
Dept: CARDIOLOGY | Facility: CLINIC | Age: 43
End: 2023-09-20
Payer: COMMERCIAL

## 2023-09-20 VITALS
SYSTOLIC BLOOD PRESSURE: 142 MMHG | HEART RATE: 72 BPM | BODY MASS INDEX: 37.08 KG/M2 | HEIGHT: 70 IN | WEIGHT: 259 LBS | DIASTOLIC BLOOD PRESSURE: 94 MMHG

## 2023-09-20 DIAGNOSIS — E88.81 METABOLIC SYNDROME: ICD-10-CM

## 2023-09-20 DIAGNOSIS — R11.0 NAUSEA: ICD-10-CM

## 2023-09-20 DIAGNOSIS — R12 HEART BURN: Primary | ICD-10-CM

## 2023-09-20 DIAGNOSIS — B18.2 HEPATITIS C CARRIER: ICD-10-CM

## 2023-09-20 DIAGNOSIS — R01.1 HEART MURMUR: ICD-10-CM

## 2023-09-20 DIAGNOSIS — F17.200 SMOKING: ICD-10-CM

## 2023-09-20 PROBLEM — E88.810 METABOLIC SYNDROME: Status: ACTIVE | Noted: 2023-09-20

## 2023-09-20 PROBLEM — IMO0001 SMOKING: Status: ACTIVE | Noted: 2023-09-20

## 2023-09-20 RX ORDER — FAMOTIDINE 40 MG/1
40 TABLET, FILM COATED ORAL DAILY
Qty: 30 TABLET | Refills: 6 | Status: SHIPPED | OUTPATIENT
Start: 2023-09-20

## 2023-09-20 RX ORDER — NICOTINE 4 MG/1
INHALANT RESPIRATORY (INHALATION)
Qty: 168 EACH | Refills: 5 | Status: SHIPPED | OUTPATIENT
Start: 2023-09-20

## 2023-09-20 NOTE — PROGRESS NOTES
Chief Complaint   Patient presents with   • Establish Care     PCP referred, CP   • Chest Pain     Started about 2 years ago, not related to exertion, now occurring more frequently. Usually will occur late at night, GERD type chest pain in mid chest that radiates to rib cage and around to her back. Will become nauseated. Pain will last at least 4-5 hours, sometimes longer. Has been to ER several times, was told anxiety/ panic attack. Tried diet modifications, didn't improve symptoms.    • Cardiac history     none   • Labs     PCP advised that they would do labs at next visit. Doesn't think she has ever had lipids checked.         CARDIAC COMPLAINTS  chest pressure/discomfort and nausea      Subjective   Beverley Tovar is a 43 y.o. female came in today for her initial cardiac evaluation.  She has no previously diagnosed cardiac history.  She has history of IV drug abuse in the past still she had overdose of heroine after which she completely quit using drugs.  She also has history of hepatitis C for which she has been treated but apparently she had recurrence.  She started noticing chest pain about 2 years ago.  It occurs as a heartburn in the mid part of the chest and then radiated into her abdomen.  It is associated with nausea and occasionally vomiting.  The pain also radiated to her back.  The symptoms normally last few hours.  She has been to the emergency room several times.  It occurs more in the evening or at nighttime.  She denies having any hematemesis or melena.  She has not had any lab work for few years.  The last time she had any labs was in 2018.  She smokes about half a pack a day and has been smoking for 25 years.  She also uses vaping.  She does not use any drugs at this time.  She has no history of any alcohol abuse.  Her father had diabetes hypertension and also heart disease.  Depression does run in the family.    Past Surgical History:   Procedure Laterality Date   • ANKLE OPEN REDUCTION  INTERNAL FIXATION Right 2018    Procedure: RIGHT ANKLE OPEN REDUCTION INTERNAL FIXATION;  Surgeon: Hira Jackson MD;  Location: [facility] OR;  Service:    •  SECTION     • EYE SURGERY     • FRACTURE SURGERY     • TUBAL ABDOMINAL LIGATION         Current Outpatient Medications   Medication Sig Dispense Refill   • buprenorphine-naloxone (SUBOXONE) 8-2 MG per SL tablet Place 1.5 tablets under the tongue Daily.     • sertraline (Zoloft) 50 MG tablet Take 1 tablet by mouth Daily for 30 days. 30 tablet 0   • famotidine (Pepcid) 40 MG tablet Take 1 tablet by mouth Daily. 30 tablet 6   • nicotine (Nicotrol) 10 MG inhaler 1 cartridge over 20 minutes. Max dose 16 cartridges daily 168 each 5     No current facility-administered medications for this visit.           ALLERGIES:  Patient has no known allergies.    Past Medical History:   Diagnosis Date   • Anxiety    • Depression    • Hepatitis C    • History of alcohol abuse    • History of drug abuse    • Liver disease    • Low back pain    • Obesity        Social History     Tobacco Use   Smoking Status Every Day   • Packs/day: 0.50   • Years: 25.00   • Pack years: 12.50   • Types: Cigarettes   Smokeless Tobacco Never          Family History   Problem Relation Age of Onset   • Depression Mother    • Anxiety disorder Mother    • Diabetes Mother         Both parents   • Stroke Mother          at 56   • Hyperlipidemia Father    • Hypertension Father    • Diabetes Father    • COPD Father    • Heart disease Father         CABG   • Heart attack Father         MI in his 50's   • Depression Sister    • Depression Sister    • Depression Sister    • Other Other         grandparents medical history unknown   • No Known Problems Daughter    • No Known Problems Daughter        Review of Systems   Constitutional: Negative for decreased appetite and malaise/fatigue.   HENT:  Negative for congestion and sore throat.    Eyes:  Negative for blurred vision, double vision and  "visual disturbance.   Cardiovascular:  Positive for chest pain. Negative for dyspnea on exertion and palpitations.   Respiratory:  Negative for shortness of breath and snoring.    Endocrine: Negative for cold intolerance and heat intolerance.   Hematologic/Lymphatic: Negative for adenopathy. Does not bruise/bleed easily.   Skin:  Negative for itching, nail changes and skin cancer.   Musculoskeletal:  Negative for arthritis and myalgias.   Gastrointestinal:  Positive for nausea and vomiting. Negative for abdominal pain, dysphagia and heartburn.   Genitourinary:  Negative for bladder incontinence and frequency.   Neurological:  Negative for dizziness, seizures and vertigo.   Psychiatric/Behavioral:  Negative for altered mental status.    Allergic/Immunologic: Negative for environmental allergies and hives.     Diabetes- No  Thyroid- normal    Objective     /94 (BP Location: Right arm)   Pulse 72   Ht 177.8 cm (70\")   Wt 117 kg (259 lb)   BMI 37.16 kg/m²     Vitals and nursing note reviewed.   Constitutional:       Appearance: Healthy appearance. Not in distress.   Eyes:      Conjunctiva/sclera: Conjunctivae normal.      Pupils: Pupils are equal, round, and reactive to light.   HENT:      Head: Normocephalic.   Pulmonary:      Effort: Pulmonary effort is normal.      Breath sounds: Normal breath sounds.   Cardiovascular:      PMI at left midclavicular line. Normal rate. Regular rhythm.      Murmurs: There is a grade 3/6 high frequency blowing holosystolic murmur at the apex.   Abdominal:      General: Bowel sounds are normal.      Palpations: Abdomen is soft.   Musculoskeletal: Normal range of motion.      Cervical back: Normal range of motion and neck supple. Skin:     General: Skin is warm and dry.   Neurological:      Mental Status: Alert, oriented to person, place, and time and oriented to person, place and time.       ECG 12 Lead    Date/Time: 9/20/2023 12:11 PM  Performed by: Apollo Tierney, " MD  Authorized by: Apollo Tierney MD   Previous ECG: no previous ECG available  Rhythm: sinus rhythm  Rate: normal  QRS axis: normal  Other findings: low voltage    Clinical impression: non-specific ECG        @ASSESSMENT/PLAN@  Class 2 Severe Obesity (BMI >=35 and <=39.9). Obesity-related health conditions include the following: none. Obesity is newly identified. BMI is is above average; BMI management plan is completed. We discussed low calorie, low carb based diet program, portion control, and increasing exercise.     Diagnoses and all orders for this visit:    1. Heart burn (Primary)  -     Treadmill Stress Test; Future  -     Adult Transthoracic Echo Complete W/ Cont if Necessary Per Protocol; Future  -     CBC & Differential; Future  -     High Sensitivity CRP; Future  -     Lipid Panel; Future  -     Amylase; Future  -     Lipase; Future  -     H.pylori,IgG / IgA Antibodies; Future  -     famotidine (Pepcid) 40 MG tablet; Take 1 tablet by mouth Daily.  Dispense: 30 tablet; Refill: 6  -     US Abdomen Limited; Future  -     nicotine (Nicotrol) 10 MG inhaler; 1 cartridge over 20 minutes. Max dose 16 cartridges daily  Dispense: 168 each; Refill: 5    2. Nausea  -     Comprehensive Metabolic Panel; Future    3. Smoking    4. Hepatitis C carrier  -     Hepatitis C RNA, Quantitative, PCR (graph); Future  -     Hepatitis Panel, Acute; Future    5. Heart murmur  -     Adult Transthoracic Echo Complete W/ Cont if Necessary Per Protocol; Future    6. Metabolic syndrome    At baseline, her heart rate is stable.  Her blood pressure mildly elevated.  Her EKG showed normal sinus rhythm, small QRS complex, normal WA interval and no significant ST-T changes.  Her clinical examination reveals a BMI of 37.  Her cardiovascular examination is unremarkable other than a systolic murmur at the mitral area.    Regarding the chest pain, she does have few risk factors for coronary artery disease but some of the symptoms seems to  be GI in nature.  I did schedule her to undergo a stress test to evaluate her functional status, chronotropic response, blood pressure response and to rule out any stress-induced ischemia.  I also advised her to undergo lab work to check her blood count, CRP level and also check the lipid panel.  She also need GI work-up and I advised her to have her ultrasound of the abdomen done to rule out gallbladder problem and also check her H. pylori, amylase and lipase level.  At this time I started her on Pepcid at 40 mg once a day    Regarding her nausea, it could be from gallbladder but cannot rule out GERD or hepatitis C.  Since he seems to be a hepatitis C carrier, I advised her to check the hepatitis panel.  If she continues to hepatitis C antigen, she may need to see a hematologist    Regarding the smoking and use of vaping, I had a long talk with her about the increased risk of developing malignancy, vascular as well as pulmonary problem.  I talked to her about Nicotrol inhalers and prescription was given    Regarding the heart murmur, it appears to be secondary to mitral regurgitation.  It also could be related to her blood pressure.  I scheduled her to undergo an echocardiogram to evaluate for LVH, LV function, valvular structures and the PA pressure    Regarding metabolic syndrome, talked to her about diet and weight reduction.  I gave her papers on Mediterranean diet               Electronically signed by Apollo Tierney MD September 20, 2023 12:10 EDT

## 2023-09-20 NOTE — PROGRESS NOTES
Beverley Tovar  reports that she has been smoking cigarettes. She has a 12.50 pack-year smoking history. She has never used smokeless tobacco.. I have educated her on the risk of diseases from using tobacco products such as cancer, COPD, and heart disease.     I advised her to quit and she is willing to quit. We have discussed the following method/s for tobacco cessation:  Prescription Medicaiton.  Together we have set a quit date for 1 week from today.  She will follow up with me in 6 months or sooner to check on her progress.    I spent 3  minutes counseling the patient.

## 2023-09-20 NOTE — LETTER
September 20, 2023       No Recipients    Patient: Beverley Tovar   YOB: 1980   Date of Visit: 9/20/2023     Dear Chris Bhatia MD:       Thank you for referring Beverley Tovar to me for evaluation. Below are the relevant portions of my assessment and plan of care.    If you have questions, please do not hesitate to call me. I look forward to following Beverley along with you.         Sincerely,        Apollo Tierney MD        CC:   No Recipients    Apollo Tierney MD  09/20/23 1211  Sign when Signing Visit  Beverley Tovar  reports that she has been smoking cigarettes. She has a 12.50 pack-year smoking history. She has never used smokeless tobacco.. I have educated her on the risk of diseases from using tobacco products such as cancer, COPD, and heart disease.     I advised her to quit and she is willing to quit. We have discussed the following method/s for tobacco cessation:  Prescription Medicaiton.  Together we have set a quit date for 1 week from today.  She will follow up with me in 6 months or sooner to check on her progress.    I spent 3  minutes counseling the patient.              Apollo Tierney MD  09/20/23 1211  Sign when Signing Visit  Chief Complaint   Patient presents with   • Establish Care     PCP referred, CP   • Chest Pain     Started about 2 years ago, not related to exertion, now occurring more frequently. Usually will occur late at night, GERD type chest pain in mid chest that radiates to rib cage and around to her back. Will become nauseated. Pain will last at least 4-5 hours, sometimes longer. Has been to ER several times, was told anxiety/ panic attack. Tried diet modifications, didn't improve symptoms.    • Cardiac history     none   • Labs     PCP advised that they would do labs at next visit. Doesn't think she has ever had lipids checked.         CARDIAC COMPLAINTS  chest pressure/discomfort and nausea      Subjective  Beverley Tovar is a  43 y.o. female came in today for her initial cardiac evaluation.  She has no previously diagnosed cardiac history.  She has history of IV drug abuse in the past still she had overdose of heroine after which she completely quit using drugs.  She also has history of hepatitis C for which she has been treated but apparently she had recurrence.  She started noticing chest pain about 2 years ago.  It occurs as a heartburn in the mid part of the chest and then radiated into her abdomen.  It is associated with nausea and occasionally vomiting.  The pain also radiated to her back.  The symptoms normally last few hours.  She has been to the emergency room several times.  It occurs more in the evening or at nighttime.  She denies having any hematemesis or melena.  She has not had any lab work for few years.  The last time she had any labs was in 2018.  She smokes about half a pack a day and has been smoking for 25 years.  She also uses vaping.  She does not use any drugs at this time.  She has no history of any alcohol abuse.  Her father had diabetes hypertension and also heart disease.  Depression does run in the family.    Past Surgical History:   Procedure Laterality Date   • ANKLE OPEN REDUCTION INTERNAL FIXATION Right 2018    Procedure: RIGHT ANKLE OPEN REDUCTION INTERNAL FIXATION;  Surgeon: Hira Jackson MD;  Location: [facility];  Service:    •  SECTION     • EYE SURGERY     • FRACTURE SURGERY     • TUBAL ABDOMINAL LIGATION         Current Outpatient Medications   Medication Sig Dispense Refill   • buprenorphine-naloxone (SUBOXONE) 8-2 MG per SL tablet Place 1.5 tablets under the tongue Daily.     • sertraline (Zoloft) 50 MG tablet Take 1 tablet by mouth Daily for 30 days. 30 tablet 0   • famotidine (Pepcid) 40 MG tablet Take 1 tablet by mouth Daily. 30 tablet 6   • nicotine (Nicotrol) 10 MG inhaler 1 cartridge over 20 minutes. Max dose 16 cartridges daily 168 each 5     No current  facility-administered medications for this visit.           ALLERGIES:  Patient has no known allergies.    Past Medical History:   Diagnosis Date   • Anxiety    • Depression    • Hepatitis C    • History of alcohol abuse    • History of drug abuse    • Liver disease    • Low back pain    • Obesity        Social History     Tobacco Use   Smoking Status Every Day   • Packs/day: 0.50   • Years: 25.00   • Pack years: 12.50   • Types: Cigarettes   Smokeless Tobacco Never          Family History   Problem Relation Age of Onset   • Depression Mother    • Anxiety disorder Mother    • Diabetes Mother         Both parents   • Stroke Mother          at 56   • Hyperlipidemia Father    • Hypertension Father    • Diabetes Father    • COPD Father    • Heart disease Father         CABG   • Heart attack Father         MI in his 50's   • Depression Sister    • Depression Sister    • Depression Sister    • Other Other         grandparents medical history unknown   • No Known Problems Daughter    • No Known Problems Daughter        Review of Systems   Constitutional: Negative for decreased appetite and malaise/fatigue.   HENT:  Negative for congestion and sore throat.    Eyes:  Negative for blurred vision, double vision and visual disturbance.   Cardiovascular:  Positive for chest pain. Negative for dyspnea on exertion and palpitations.   Respiratory:  Negative for shortness of breath and snoring.    Endocrine: Negative for cold intolerance and heat intolerance.   Hematologic/Lymphatic: Negative for adenopathy. Does not bruise/bleed easily.   Skin:  Negative for itching, nail changes and skin cancer.   Musculoskeletal:  Negative for arthritis and myalgias.   Gastrointestinal:  Positive for nausea and vomiting. Negative for abdominal pain, dysphagia and heartburn.   Genitourinary:  Negative for bladder incontinence and frequency.   Neurological:  Negative for dizziness, seizures and vertigo.   Psychiatric/Behavioral:  Negative for  "altered mental status.    Allergic/Immunologic: Negative for environmental allergies and hives.     Diabetes- No  Thyroid- normal    Objective    /94 (BP Location: Right arm)   Pulse 72   Ht 177.8 cm (70\")   Wt 117 kg (259 lb)   BMI 37.16 kg/m²     Vitals and nursing note reviewed.   Constitutional:       Appearance: Healthy appearance. Not in distress.   Eyes:      Conjunctiva/sclera: Conjunctivae normal.      Pupils: Pupils are equal, round, and reactive to light.   HENT:      Head: Normocephalic.   Pulmonary:      Effort: Pulmonary effort is normal.      Breath sounds: Normal breath sounds.   Cardiovascular:      PMI at left midclavicular line. Normal rate. Regular rhythm.      Murmurs: There is a grade 3/6 high frequency blowing holosystolic murmur at the apex.   Abdominal:      General: Bowel sounds are normal.      Palpations: Abdomen is soft.   Musculoskeletal: Normal range of motion.      Cervical back: Normal range of motion and neck supple. Skin:     General: Skin is warm and dry.   Neurological:      Mental Status: Alert, oriented to person, place, and time and oriented to person, place and time.       ECG 12 Lead    Date/Time: 9/20/2023 12:11 PM  Performed by: Apollo Tierney MD  Authorized by: Apollo Tierney MD   Previous ECG: no previous ECG available  Rhythm: sinus rhythm  Rate: normal  QRS axis: normal  Other findings: low voltage    Clinical impression: non-specific ECG        @ASSESSMENT/PLAN@  Class 2 Severe Obesity (BMI >=35 and <=39.9). Obesity-related health conditions include the following: none. Obesity is newly identified. BMI is is above average; BMI management plan is completed. We discussed low calorie, low carb based diet program, portion control, and increasing exercise.     Diagnoses and all orders for this visit:    1. Heart burn (Primary)  -     Treadmill Stress Test; Future  -     Adult Transthoracic Echo Complete W/ Cont if Necessary Per Protocol; Future  -     " CBC & Differential; Future  -     High Sensitivity CRP; Future  -     Lipid Panel; Future  -     Amylase; Future  -     Lipase; Future  -     H.pylori,IgG / IgA Antibodies; Future  -     famotidine (Pepcid) 40 MG tablet; Take 1 tablet by mouth Daily.  Dispense: 30 tablet; Refill: 6  -     US Abdomen Limited; Future  -     nicotine (Nicotrol) 10 MG inhaler; 1 cartridge over 20 minutes. Max dose 16 cartridges daily  Dispense: 168 each; Refill: 5    2. Nausea  -     Comprehensive Metabolic Panel; Future    3. Smoking    4. Hepatitis C carrier  -     Hepatitis C RNA, Quantitative, PCR (graph); Future  -     Hepatitis Panel, Acute; Future    5. Heart murmur  -     Adult Transthoracic Echo Complete W/ Cont if Necessary Per Protocol; Future    6. Metabolic syndrome    At baseline, her heart rate is stable.  Her blood pressure mildly elevated.  Her EKG showed normal sinus rhythm, small QRS complex, normal WV interval and no significant ST-T changes.  Her clinical examination reveals a BMI of 37.  Her cardiovascular examination is unremarkable other than a systolic murmur at the mitral area.    Regarding the chest pain, she does have few risk factors for coronary artery disease but some of the symptoms seems to be GI in nature.  I did schedule her to undergo a stress test to evaluate her functional status, chronotropic response, blood pressure response and to rule out any stress-induced ischemia.  I also advised her to undergo lab work to check her blood count, CRP level and also check the lipid panel.  She also need GI work-up and I advised her to have her ultrasound of the abdomen done to rule out gallbladder problem and also check her H. pylori, amylase and lipase level.  At this time I started her on Pepcid at 40 mg once a day    Regarding her nausea, it could be from gallbladder but cannot rule out GERD or hepatitis C.  Since he seems to be a hepatitis C carrier, I advised her to check the hepatitis panel.  If she  continues to hepatitis C antigen, she may need to see a hematologist    Regarding the smoking and use of vaping, I had a long talk with her about the increased risk of developing malignancy, vascular as well as pulmonary problem.  I talked to her about Nicotrol inhalers and prescription was given    Regarding the heart murmur, it appears to be secondary to mitral regurgitation.  It also could be related to her blood pressure.  I scheduled her to undergo an echocardiogram to evaluate for LVH, LV function, valvular structures and the PA pressure    Regarding metabolic syndrome, talked to her about diet and weight reduction.  I gave her papers on Mediterranean diet               Electronically signed by Apollo Tierney MD September 20, 2023 12:10 EDT

## 2023-09-21 ENCOUNTER — PATIENT ROUNDING (BHMG ONLY) (OUTPATIENT)
Dept: CARDIOLOGY | Facility: CLINIC | Age: 43
End: 2023-09-21
Payer: COMMERCIAL

## 2023-09-21 NOTE — PROGRESS NOTES
September 21, 2023    Hello, may I speak with Beverley Tovar?    My name is Josephine CONTRERAS      I am  with [facility]  [facility]  [address] [place] [address] [phone].    Before we get started may I verify your date of birth? 1980    I am calling to officially welcome you to our practice and ask about your recent visit. Is this a good time to talk? yes    Tell me about your visit with us. What things went well?  It was awesome and really fast . nurse really listened and was really nice - dr downs knew my dad so I am glad I got to go there        We're always looking for ways to make our patients' experiences even better. Do you have recommendations on ways we may improve?  no-I thought it was great visit    Overall were you satisfied with your first visit to our practice? yes       I appreciate you taking the time to speak with me today. Is there anything else I can do for you? no      Thank you, and have a great day.

## 2023-09-25 ENCOUNTER — TELEPHONE (OUTPATIENT)
Dept: FAMILY MEDICINE CLINIC | Facility: CLINIC | Age: 43
End: 2023-09-25

## 2023-09-25 NOTE — TELEPHONE ENCOUNTER
"Relay     \"Attempted to contact patient to reschedule previous missed appointment on 9/25/2023    HUB okay to reschedule appointment at patients earliest convenience.    Lisa Orellana, Rodrick Rep \"                "

## 2023-09-27 ENCOUNTER — LAB (OUTPATIENT)
Dept: LAB | Facility: HOSPITAL | Age: 43
End: 2023-09-27
Payer: COMMERCIAL

## 2023-09-27 ENCOUNTER — HOSPITAL ENCOUNTER (OUTPATIENT)
Dept: CARDIOLOGY | Facility: HOSPITAL | Age: 43
Discharge: HOME | End: 2023-09-27
Payer: COMMERCIAL

## 2023-09-27 DIAGNOSIS — R12 HEART BURN: ICD-10-CM

## 2023-09-27 DIAGNOSIS — R11.0 NAUSEA: ICD-10-CM

## 2023-09-27 DIAGNOSIS — R01.1 HEART MURMUR: ICD-10-CM

## 2023-09-27 DIAGNOSIS — B18.2 HEPATITIS C CARRIER: ICD-10-CM

## 2023-09-27 LAB
ALBUMIN SERPL-MCNC: 4.1 G/DL (ref 3.5–5.2)
ALBUMIN/GLOB SERPL: 1.3 G/DL
ALP SERPL-CCNC: 58 U/L (ref 39–117)
ALT SERPL W P-5'-P-CCNC: 32 U/L (ref 1–33)
AMYLASE SERPL-CCNC: 39 U/L (ref 28–100)
ANION GAP SERPL CALCULATED.3IONS-SCNC: 10.2 MMOL/L (ref 5–15)
AST SERPL-CCNC: 21 U/L (ref 1–32)
BASOPHILS # BLD AUTO: 0.05 10*3/MM3 (ref 0–0.2)
BASOPHILS NFR BLD AUTO: 0.6 % (ref 0–1.5)
BH CV ECHO MEAS - ACS: 2.01 CM
BH CV ECHO MEAS - AO MAX PG: 6.8 MMHG
BH CV ECHO MEAS - AO MEAN PG: 4 MMHG
BH CV ECHO MEAS - AO ROOT DIAM: 2.9 CM
BH CV ECHO MEAS - AO V2 MAX: 130 CM/SEC
BH CV ECHO MEAS - AO V2 VTI: 26.3 CM
BH CV ECHO MEAS - EDV(CUBED): 73 ML
BH CV ECHO MEAS - EDV(MOD-SP4): 68.2 ML
BH CV ECHO MEAS - EF(MOD-SP4): 73.9 %
BH CV ECHO MEAS - EF_3D-VOL: 63 %
BH CV ECHO MEAS - ESV(CUBED): 14.9 ML
BH CV ECHO MEAS - ESV(MOD-SP4): 17.8 ML
BH CV ECHO MEAS - FS: 41.1 %
BH CV ECHO MEAS - IVS/LVPW: 1.13 CM
BH CV ECHO MEAS - IVSD: 1.19 CM
BH CV ECHO MEAS - LA DIMENSION: 3.4 CM
BH CV ECHO MEAS - LAT PEAK E' VEL: 10.9 CM/SEC
BH CV ECHO MEAS - LV DIASTOLIC VOL/BSA (35-75): 29.3 CM2
BH CV ECHO MEAS - LV MASS(C)D: 160 GRAMS
BH CV ECHO MEAS - LV SYSTOLIC VOL/BSA (12-30): 7.6 CM2
BH CV ECHO MEAS - LVIDD: 4.2 CM
BH CV ECHO MEAS - LVIDS: 2.46 CM
BH CV ECHO MEAS - LVPWD: 1.05 CM
BH CV ECHO MEAS - MED PEAK E' VEL: 7.7 CM/SEC
BH CV ECHO MEAS - MV A MAX VEL: 67.1 CM/SEC
BH CV ECHO MEAS - MV DEC SLOPE: 296.3 CM/SEC2
BH CV ECHO MEAS - MV DEC TIME: 0.24 SEC
BH CV ECHO MEAS - MV E MAX VEL: 68.8 CM/SEC
BH CV ECHO MEAS - MV E/A: 1.03
BH CV ECHO MEAS - RAP SYSTOLE: 10 MMHG
BH CV ECHO MEAS - RVSP: 28.9 MMHG
BH CV ECHO MEAS - SI(MOD-SP4): 21.6 ML/M2
BH CV ECHO MEAS - SV(MOD-SP4): 50.4 ML
BH CV ECHO MEAS - TR MAX PG: 18.9 MMHG
BH CV ECHO MEAS - TR MAX VEL: 217.2 CM/SEC
BH CV ECHO MEASUREMENTS AVERAGE E/E' RATIO: 7.4
BH CV STRESS DURATION MIN STAGE 1: 3
BH CV STRESS DURATION SEC STAGE 1: 0
BH CV STRESS GRADE STAGE 1: 10
BH CV STRESS METS STAGE 1: 5
BH CV STRESS PROTOCOL 1: NORMAL
BH CV STRESS RECOVERY BP: NORMAL MMHG
BH CV STRESS RECOVERY HR: 100 BPM
BH CV STRESS SPEED STAGE 1: 1.7
BH CV STRESS STAGE 1: 1
BH CV XLRA - RV BASE: 3.5 CM
BH CV XLRA - RV LENGTH: 7.3 CM
BH CV XLRA - RV MID: 3.1 CM
BILIRUB SERPL-MCNC: 0.5 MG/DL (ref 0–1.2)
BUN SERPL-MCNC: 8 MG/DL (ref 6–20)
BUN/CREAT SERPL: 11.3 (ref 7–25)
CALCIUM SPEC-SCNC: 9.2 MG/DL (ref 8.6–10.5)
CHLORIDE SERPL-SCNC: 104 MMOL/L (ref 98–107)
CHOLEST SERPL-MCNC: 156 MG/DL (ref 0–200)
CO2 SERPL-SCNC: 24.8 MMOL/L (ref 22–29)
CREAT SERPL-MCNC: 0.71 MG/DL (ref 0.57–1)
DEPRECATED RDW RBC AUTO: 42 FL (ref 37–54)
EGFRCR SERPLBLD CKD-EPI 2021: 108.3 ML/MIN/1.73
EOSINOPHIL # BLD AUTO: 0.1 10*3/MM3 (ref 0–0.4)
EOSINOPHIL NFR BLD AUTO: 1.1 % (ref 0.3–6.2)
ERYTHROCYTE [DISTWIDTH] IN BLOOD BY AUTOMATED COUNT: 12.3 % (ref 12.3–15.4)
GLOBULIN UR ELPH-MCNC: 3.2 GM/DL
GLUCOSE SERPL-MCNC: 90 MG/DL (ref 65–99)
HAV IGM SERPL QL IA: ABNORMAL
HBV CORE IGM SERPL QL IA: ABNORMAL
HBV SURFACE AG SERPL QL IA: ABNORMAL
HCT VFR BLD AUTO: 45.9 % (ref 34–46.6)
HCV AB SER DONR QL: REACTIVE
HDLC SERPL-MCNC: 44 MG/DL (ref 40–60)
HGB BLD-MCNC: 15.5 G/DL (ref 12–15.9)
IMM GRANULOCYTES # BLD AUTO: 0.02 10*3/MM3 (ref 0–0.05)
IMM GRANULOCYTES NFR BLD AUTO: 0.2 % (ref 0–0.5)
LDLC SERPL CALC-MCNC: 92 MG/DL (ref 0–100)
LDLC/HDLC SERPL: 2.06 {RATIO}
LEFT ATRIUM VOLUME INDEX: 15.5 ML/M2
LIPASE SERPL-CCNC: 22 U/L (ref 13–60)
LYMPHOCYTES # BLD AUTO: 2.08 10*3/MM3 (ref 0.7–3.1)
LYMPHOCYTES NFR BLD AUTO: 23.1 % (ref 19.6–45.3)
MAXIMAL PREDICTED HEART RATE: 177 BPM
MCH RBC QN AUTO: 31.4 PG (ref 26.6–33)
MCHC RBC AUTO-ENTMCNC: 33.8 G/DL (ref 31.5–35.7)
MCV RBC AUTO: 92.9 FL (ref 79–97)
MONOCYTES # BLD AUTO: 0.38 10*3/MM3 (ref 0.1–0.9)
MONOCYTES NFR BLD AUTO: 4.2 % (ref 5–12)
NEUTROPHILS NFR BLD AUTO: 6.36 10*3/MM3 (ref 1.7–7)
NEUTROPHILS NFR BLD AUTO: 70.8 % (ref 42.7–76)
NRBC BLD AUTO-RTO: 0 /100 WBC (ref 0–0.2)
PERCENT MAX PREDICTED HR: 91.53 %
PLATELET # BLD AUTO: 310 10*3/MM3 (ref 140–450)
PMV BLD AUTO: 10.1 FL (ref 6–12)
POTASSIUM SERPL-SCNC: 4 MMOL/L (ref 3.5–5.2)
PROT SERPL-MCNC: 7.3 G/DL (ref 6–8.5)
RBC # BLD AUTO: 4.94 10*6/MM3 (ref 3.77–5.28)
SODIUM SERPL-SCNC: 139 MMOL/L (ref 136–145)
STRESS BASELINE BP: NORMAL MMHG
STRESS BASELINE HR: 70 BPM
STRESS PERCENT HR: 108 %
STRESS POST ESTIMATED WORKLOAD: 7.3 METS
STRESS POST EXERCISE DUR MIN: 6 MIN
STRESS POST EXERCISE DUR SEC: 7 SEC
STRESS POST PEAK BP: NORMAL MMHG
STRESS POST PEAK HR: 162 BPM
STRESS TARGET HR: 150 BPM
TRIGL SERPL-MCNC: 107 MG/DL (ref 0–150)
VLDLC SERPL-MCNC: 20 MG/DL (ref 5–40)
WBC NRBC COR # BLD: 8.99 10*3/MM3 (ref 3.4–10.8)

## 2023-09-27 PROCEDURE — 85025 COMPLETE CBC W/AUTO DIFF WBC: CPT | Performed by: INTERNAL MEDICINE

## 2023-09-27 PROCEDURE — 80053 COMPREHEN METABOLIC PANEL: CPT | Performed by: INTERNAL MEDICINE

## 2023-09-27 PROCEDURE — 82150 ASSAY OF AMYLASE: CPT | Performed by: INTERNAL MEDICINE

## 2023-09-27 PROCEDURE — 87522 HEPATITIS C REVRS TRNSCRPJ: CPT | Performed by: INTERNAL MEDICINE

## 2023-09-27 PROCEDURE — 80061 LIPID PANEL: CPT | Performed by: INTERNAL MEDICINE

## 2023-09-27 PROCEDURE — 93306 TTE W/DOPPLER COMPLETE: CPT

## 2023-09-27 PROCEDURE — 86677 HELICOBACTER PYLORI ANTIBODY: CPT | Performed by: INTERNAL MEDICINE

## 2023-09-27 PROCEDURE — 80074 ACUTE HEPATITIS PANEL: CPT | Performed by: INTERNAL MEDICINE

## 2023-09-27 PROCEDURE — 83690 ASSAY OF LIPASE: CPT | Performed by: INTERNAL MEDICINE

## 2023-09-27 PROCEDURE — 86141 C-REACTIVE PROTEIN HS: CPT | Performed by: INTERNAL MEDICINE

## 2023-09-27 PROCEDURE — 76705 ECHO EXAM OF ABDOMEN: CPT

## 2023-09-27 PROCEDURE — 93017 CV STRESS TEST TRACING ONLY: CPT

## 2023-09-28 LAB — CRP SERPL-MCNC: 0.33 MG/DL (ref 0.01–0.5)

## 2023-09-29 ENCOUNTER — TELEPHONE (OUTPATIENT)
Dept: CARDIOLOGY | Facility: CLINIC | Age: 43
End: 2023-09-29
Payer: COMMERCIAL

## 2023-09-29 LAB
H PYLORI IGA SER-ACNC: 17.6 UNITS (ref 0–8.9)
H PYLORI IGG SER IA-ACNC: 6.77 INDEX VALUE (ref 0–0.79)

## 2023-09-29 RX ORDER — METOPROLOL SUCCINATE 25 MG/1
25 TABLET, EXTENDED RELEASE ORAL DAILY
Qty: 30 TABLET | Refills: 11 | Status: SHIPPED | OUTPATIENT
Start: 2023-09-29

## 2023-09-29 NOTE — TELEPHONE ENCOUNTER
----- Message from Kayla Salmon RN sent at 9/28/2023  5:07 PM EDT -----    ----- Message -----  From: Apollo Tierney MD  Sent: 9/28/2023   6:45 AM EDT  To: Kayla Salmon RN    Toprol-

## 2023-10-02 ENCOUNTER — OFFICE VISIT (OUTPATIENT)
Dept: FAMILY MEDICINE CLINIC | Facility: CLINIC | Age: 43
End: 2023-10-02
Payer: COMMERCIAL

## 2023-10-02 VITALS
DIASTOLIC BLOOD PRESSURE: 96 MMHG | RESPIRATION RATE: 18 BRPM | WEIGHT: 261 LBS | SYSTOLIC BLOOD PRESSURE: 142 MMHG | BODY MASS INDEX: 37.37 KG/M2 | HEART RATE: 87 BPM | OXYGEN SATURATION: 99 % | HEIGHT: 70 IN

## 2023-10-02 DIAGNOSIS — F41.9 ANXIETY: ICD-10-CM

## 2023-10-02 DIAGNOSIS — F11.20 OPIOID DEPENDENCE ON AGONIST THERAPY: ICD-10-CM

## 2023-10-02 DIAGNOSIS — Z00.00 ROUTINE GENERAL MEDICAL EXAMINATION AT A HEALTH CARE FACILITY: Primary | ICD-10-CM

## 2023-10-02 DIAGNOSIS — Z23 ENCOUNTER FOR IMMUNIZATION: ICD-10-CM

## 2023-10-02 DIAGNOSIS — K80.20 CALCULUS OF GALLBLADDER WITHOUT CHOLECYSTITIS WITHOUT OBSTRUCTION: ICD-10-CM

## 2023-10-02 DIAGNOSIS — R12 HEART BURN: ICD-10-CM

## 2023-10-02 LAB
HCV RNA SERPL NAA+PROBE-ACNC: NORMAL IU/ML
HCV RNA SERPL NAA+PROBE-LOG IU: 5.79 LOG10 IU/ML
TEST INFORMATION: NORMAL

## 2023-10-02 PROCEDURE — 99396 PREV VISIT EST AGE 40-64: CPT | Performed by: FAMILY MEDICINE

## 2023-10-02 RX ORDER — ONDANSETRON HYDROCHLORIDE 8 MG/1
8 TABLET, FILM COATED ORAL EVERY 8 HOURS PRN
Qty: 15 TABLET | Refills: 0 | Status: SHIPPED | OUTPATIENT
Start: 2023-10-02 | End: 2023-10-07

## 2023-10-02 RX ORDER — PANTOPRAZOLE SODIUM 40 MG/1
40 TABLET, DELAYED RELEASE ORAL DAILY
Qty: 30 TABLET | Refills: 0 | Status: SHIPPED | OUTPATIENT
Start: 2023-10-02 | End: 2023-11-01

## 2023-10-02 RX ORDER — ESCITALOPRAM OXALATE 10 MG/1
10 TABLET ORAL DAILY
Qty: 30 TABLET | Refills: 0 | Status: SHIPPED | OUTPATIENT
Start: 2023-10-02 | End: 2023-11-01

## 2023-10-02 NOTE — PROGRESS NOTES
"Chief Complaint  Annual Exam    Subjective        Beverley Tovar presents to [facility]  History of Present Illness  Patient is a 43 y.o. female here today for annual physical, preventive labs and also to discuss chronic medical problems including anxiety, heartburn, opioid dependence on agonist therapy. Patient denies adverse effects of medications, headache, dizziness, chest pain, palpitations, shortness of breath, cough, nausea, vomiting, abdominal pain, muscle aches, joint pain, and fatigue. Patient complains of no significant issue. Patient is here for monitoring of chronic issues due to need for monitoring of renal function, liver function, adverse effects, and side effects      Objective   Vital Signs:  /96   Pulse 87   Resp 18   Ht 177.8 cm (70\")   Wt 118 kg (261 lb)   SpO2 99%   BMI 37.45 kg/m²   Estimated body mass index is 37.45 kg/m² as calculated from the following:    Height as of this encounter: 177.8 cm (70\").    Weight as of this encounter: 118 kg (261 lb).               Physical Exam  Vitals and nursing note reviewed.   Constitutional:       Appearance: Normal appearance. She is obese.   HENT:      Head: Normocephalic and atraumatic.      Right Ear: Tympanic membrane and ear canal normal.      Left Ear: Tympanic membrane and ear canal normal.      Nose: Nose normal.      Mouth/Throat:      Mouth: Mucous membranes are moist.   Eyes:      Extraocular Movements: Extraocular movements intact.      Pupils: Pupils are equal, round, and reactive to light.   Cardiovascular:      Rate and Rhythm: Normal rate and regular rhythm. No extrasystoles are present.     Heart sounds: Normal heart sounds. No murmur heard.  Pulmonary:      Effort: Pulmonary effort is normal.      Breath sounds: Normal breath sounds. No decreased breath sounds, wheezing or rhonchi.   Abdominal:      General: Abdomen is protuberant.      Palpations: Abdomen is soft. There is no mass.      " Tenderness: There is no abdominal tenderness. There is no right CVA tenderness, left CVA tenderness, guarding or rebound.   Musculoskeletal:         General: Normal range of motion.      Cervical back: Normal range of motion and neck supple.      Right lower leg: No edema.      Left lower leg: No edema.   Lymphadenopathy:      Head:      Right side of head: No submental, submandibular, preauricular, posterior auricular or occipital adenopathy.      Left side of head: No submental, submandibular, preauricular, posterior auricular or occipital adenopathy.      Cervical: No cervical adenopathy.      Upper Body:      Right upper body: No supraclavicular or axillary adenopathy.      Left upper body: No supraclavicular or axillary adenopathy.   Skin:     Findings: No rash.   Neurological:      General: No focal deficit present.      Mental Status: She is alert and oriented to person, place, and time.      Cranial Nerves: Cranial nerves 2-12 are intact.      Sensory: Sensation is intact.      Motor: Motor function is intact.      Coordination: Coordination is intact.      Gait: Gait is intact.      Deep Tendon Reflexes: Reflexes are normal and symmetric.   Psychiatric:         Attention and Perception: Attention and perception normal.         Mood and Affect: Mood normal.         Speech: Speech normal.         Behavior: Behavior normal. Behavior is cooperative.         Thought Content: Thought content normal.         Cognition and Memory: Cognition normal.         Judgment: Judgment normal.      Result Review :                   Assessment and Plan   Diagnoses and all orders for this visit:    1. Routine general medical examination at a health care facility (Primary)  -     CBC & Differential; Future  -     Comprehensive Metabolic Panel; Future  -     Lipid Panel; Future  -     TSH; Future  -     POC Urinalysis Dipstick; Future  -     Hepatitis C Antibody; Future    2. Calculus of gallbladder without cholecystitis without  obstruction  Assessment & Plan:  Seen on her recent Ultrasound. Will refer to Surgery.    Orders:  -     Ambulatory Referral to General Surgery    3. Anxiety  Assessment & Plan:  She states that the Zoloft did not help. She is willing to try a different medication.    Orders:  -     escitalopram (Lexapro) 10 MG tablet; Take 1 tablet by mouth Daily for 30 days.  Dispense: 30 tablet; Refill: 0    4. Opioid dependence on agonist therapy  Assessment & Plan:  Stable on Suboxone.      5. Heart burn  Assessment & Plan:  Will try her on Pantoprazole.    Orders:  -     pantoprazole (Protonix) 40 MG EC tablet; Take 1 tablet by mouth Daily for 30 days.  Dispense: 30 tablet; Refill: 0    6. Encounter for immunization  -     Fluzone >6 Months ([id number]); Future    Other orders  -     ondansetron (Zofran) 8 MG tablet; Take 1 tablet by mouth Every 8 (Eight) Hours As Needed for Nausea or Vomiting for up to 5 days.  Dispense: 15 tablet; Refill: 0           I spent 30 minutes caring for Beverley on this date of service. This time includes time spent by me in the following activities:preparing for the visit, performing a medically appropriate examination and/or evaluation , counseling and educating the patient/family/caregiver, ordering medications, tests, or procedures, and documenting information in the medical record  Follow Up   Return in about 1 month (around 11/2/2023).  Patient was given instructions and counseling regarding her condition or for health maintenance advice. Please see specific information pulled into the AVS if appropriate.     The patient is advised to continue all of his regular medications as prescribed. He was counseled regarding the importance of diet, exercise and medication compliance.    The preventive exam has been reviewed in detail.  The patient has been fully counseled on preventative guidelines for vaccines, cancer screenings, and other health maintenance needs.   The patient has been counseled on  guidelines for maintaining a lifestyle to promote good health and to minimize chronic diseases.  The patient has been assisted with scheduling these healthcare procedures for the coming year and given a written document of health maintenance and anticipatory guidance for age with the AVS.      This document has been electronically signed by Chris Bhatia MD  October 3, 2023 00:11 EDT

## 2023-10-03 PROBLEM — T40.1X1A: Status: RESOLVED | Noted: 2018-02-04 | Resolved: 2023-10-03

## 2023-10-03 PROBLEM — S82.841A BIMALLEOLAR FRACTURE, RIGHT, CLOSED, INITIAL ENCOUNTER: Status: RESOLVED | Noted: 2018-02-04 | Resolved: 2023-10-03

## 2023-10-03 PROBLEM — F41.9 ANXIETY: Chronic | Status: ACTIVE | Noted: 2023-08-23

## 2023-10-03 PROBLEM — K80.20 CALCULUS OF GALLBLADDER WITHOUT CHOLECYSTITIS WITHOUT OBSTRUCTION: Status: ACTIVE | Noted: 2023-10-03

## 2023-10-12 ENCOUNTER — TELEPHONE (OUTPATIENT)
Dept: FAMILY MEDICINE CLINIC | Facility: CLINIC | Age: 43
End: 2023-10-12

## 2023-10-12 NOTE — TELEPHONE ENCOUNTER
Caller: Beverley Tovar    Relationship: Self    Best call back number: [phone]     Caller requesting test results: BEVERLEY TOVAR     What test was performed: BLOOD WORK     When was the test performed: 9/27/23     Where was the test performed: IN OFFICE     Additional notes: PATIENT STATES THE RESULTS TO THIS TEST CAME BACK AND IT SHOWS SHE HAS TESTED POSITIVE FOR H PYLORI AND SHE WOULD LIKE TO KNOW WHAT THE NEXT STEPS TO TREATING THIS ARE.

## 2023-11-07 ENCOUNTER — OFFICE VISIT (OUTPATIENT)
Dept: FAMILY MEDICINE CLINIC | Facility: CLINIC | Age: 43
End: 2023-11-07
Payer: COMMERCIAL

## 2023-11-07 VITALS
HEIGHT: 70 IN | RESPIRATION RATE: 18 BRPM | BODY MASS INDEX: 36.42 KG/M2 | WEIGHT: 254.4 LBS | OXYGEN SATURATION: 97 % | HEART RATE: 81 BPM | TEMPERATURE: 96.9 F | SYSTOLIC BLOOD PRESSURE: 126 MMHG | DIASTOLIC BLOOD PRESSURE: 82 MMHG

## 2023-11-07 DIAGNOSIS — F41.9 ANXIETY: Primary | ICD-10-CM

## 2023-11-07 DIAGNOSIS — K21.9 GASTROESOPHAGEAL REFLUX DISEASE WITHOUT ESOPHAGITIS: ICD-10-CM

## 2023-11-07 DIAGNOSIS — F17.210 CIGARETTE NICOTINE DEPENDENCE WITHOUT COMPLICATION: ICD-10-CM

## 2023-11-07 PROBLEM — F17.200 SMOKING: Status: RESOLVED | Noted: 2023-09-20 | Resolved: 2023-11-07

## 2023-11-07 PROBLEM — IMO0001 SMOKING: Status: RESOLVED | Noted: 2023-09-20 | Resolved: 2023-11-07

## 2023-11-07 PROCEDURE — 99214 OFFICE O/P EST MOD 30 MIN: CPT | Performed by: FAMILY MEDICINE

## 2023-11-07 RX ORDER — ESCITALOPRAM OXALATE 10 MG/1
10 TABLET ORAL DAILY
Qty: 90 TABLET | Refills: 0 | Status: SHIPPED | OUTPATIENT
Start: 2023-11-07 | End: 2024-02-05

## 2023-11-07 RX ORDER — PANTOPRAZOLE SODIUM 40 MG/1
40 TABLET, DELAYED RELEASE ORAL DAILY
Qty: 30 TABLET | Refills: 0 | Status: SHIPPED | OUTPATIENT
Start: 2023-11-07 | End: 2023-12-07

## 2023-11-07 NOTE — PROGRESS NOTES
"Chief Complaint  Follow-up (Cont. Care of Anxiety/)    Subjective        Beverley Tovar presents to [facility]  History of Present Illness  The patient is a 43 y.o. female who is here today for follow up anxiety, she states that the Lexapro seem to be helping. Her reflux is not bothering her as much except for a couple of days where it gave her some problems. The patient has not used the Nicotrol because she does not feel like she is ready to quit smoking. The patient denies any chest pain, abdominal pain, nausea, vomiting, shortness of breath, headaches or dizziness.      Objective   Vital Signs:  /82   Pulse 81   Temp 96.9 °F (36.1 °C) (Temporal)   Resp 18   Ht 177.8 cm (70\")   Wt 115 kg (254 lb 6.4 oz)   SpO2 97%   BMI 36.50 kg/m²   Estimated body mass index is 36.5 kg/m² as calculated from the following:    Height as of this encounter: 177.8 cm (70\").    Weight as of this encounter: 115 kg (254 lb 6.4 oz).               Physical Exam  Vitals and nursing note reviewed.   Constitutional:       Appearance: Normal appearance. She is obese.   HENT:      Head: Normocephalic and atraumatic.      Right Ear: Tympanic membrane and ear canal normal.      Left Ear: Tympanic membrane and ear canal normal.      Nose: Nose normal.      Mouth/Throat:      Mouth: Mucous membranes are moist.   Eyes:      Extraocular Movements: Extraocular movements intact.      Pupils: Pupils are equal, round, and reactive to light.   Cardiovascular:      Rate and Rhythm: Normal rate and regular rhythm. No extrasystoles are present.     Heart sounds: Normal heart sounds. No murmur heard.  Pulmonary:      Effort: Pulmonary effort is normal.      Breath sounds: Normal breath sounds. No decreased breath sounds, wheezing or rhonchi.   Abdominal:      General: Abdomen is protuberant.      Palpations: Abdomen is soft. There is no mass.      Tenderness: There is no abdominal tenderness. There is no right " CVA tenderness, left CVA tenderness, guarding or rebound.   Musculoskeletal:         General: Normal range of motion.      Cervical back: Normal range of motion and neck supple.      Right lower leg: No edema.      Left lower leg: No edema.   Skin:     Findings: No rash.   Neurological:      General: No focal deficit present.      Mental Status: She is alert and oriented to person, place, and time.      Sensory: Sensation is intact.      Motor: Motor function is intact.      Coordination: Coordination is intact.      Gait: Gait is intact.      Deep Tendon Reflexes: Reflexes are normal and symmetric.   Psychiatric:         Attention and Perception: Attention and perception normal.         Mood and Affect: Mood normal.         Speech: Speech normal.         Behavior: Behavior normal. Behavior is cooperative.         Thought Content: Thought content normal.        Result Review :                   Assessment and Plan   Diagnoses and all orders for this visit:    1. Anxiety (Primary)  Assessment & Plan:  Improving with Lexapro.    Orders:  -     escitalopram (Lexapro) 10 MG tablet; Take 1 tablet by mouth Daily for 90 days.  Dispense: 90 tablet; Refill: 0    2. Gastroesophageal reflux disease without esophagitis  Assessment & Plan:  Stable on Protinix. Advised to take the Protonix as needed.    Orders:  -     pantoprazole (Protonix) 40 MG EC tablet; Take 1 tablet by mouth Daily for 30 days.  Dispense: 30 tablet; Refill: 0    3. Cigarette nicotine dependence without complication  Assessment & Plan:  Tobacco use is unchanged.  Smoking cessation counseling was provided.  Pharmacotherapy was prescribed as ordered.  But the patient has not started using the Nicotrol that was prescribed.  Tobacco use will be reassessed at the next regular appointment.             I spent 20 minutes caring for Beverley on this date of service. This time includes time spent by me in the following activities:preparing for the visit, performing a  medically appropriate examination and/or evaluation , counseling and educating the patient/family/caregiver, ordering medications, tests, or procedures, and documenting information in the medical record  Follow Up   Return in about 3 months (around 2/7/2024).  Patient was given instructions and counseling regarding her condition or for health maintenance advice. Please see specific information pulled into the AVS if appropriate.     The patient is advised to continue all of her regular medications as prescribed. She was counseled regarding the importance of diet, exercise and medication compliance.      This document has been electronically signed by Chris Bhatia MD  November 7, 2023 16:36 EST

## 2023-11-07 NOTE — ASSESSMENT & PLAN NOTE
Tobacco use is unchanged.  Smoking cessation counseling was provided.  Pharmacotherapy was prescribed as ordered.  But the patient has not started using the Nicotrol that was prescribed.  Tobacco use will be reassessed at the next regular appointment.

## 2023-11-07 NOTE — PATIENT INSTRUCTIONS
Major Depressive Disorder, Adult  Major depressive disorder (MDD) is a mental health condition. People with this disorder feel very sad, hopeless, and lose interest in things. Symptoms last most of the day, almost every day, for 2 weeks. MDD can affect:  Relationships.  Work and school.  Things you usually like to do.  What are the causes?  The cause of MDD is not known.  What increases the risk?  Having family members with depression.  Being female.  Family problems.  Alcohol or drug misuse.  A lot of stress in your life, such as from:  Living without basic needs such as food and housing.  Being treated poorly because of race, sex, or [employer] (discrimination).  Things that caused you pain as a child, especially if you lost a parent or were abused.  Health and mental problems that you have had for a long time.  What are the signs or symptoms?  The main symptoms of this condition are:  Being sad all the time.  Being grouchy (irritable) all the time.  Not enjoying the things you usually like.  Sleeping too much or too little.  Eating too much or too little.  Feeling tired.  Other symptoms include:  Gaining or losing weight, without knowing why.  Being restless and weak.  Feeling hopeless, worthless, or guilty.  Trouble thinking or making decisions.  Thoughts of hurting yourself or others, or thoughts of ending your life.  Spending a lot of time alone.  Being unable to do daily tasks.  If you have very bad MDD, you may:  Believe things that are not true.  Hear, see, taste, or feel things that are not there.  Have mild depression that lasts for at least 2 years.  Feel very sad and hopeless.  Have trouble speaking or moving.  Feel very sad during some seasons.  How is this treated?  Talk therapy. This teaches you about thoughts, feelings, and actions and how to change them.  This can also help you to talk with others.  This can be done with members of your family.  Medicines.  Lifestyle changes. You may need to:  Limit  alcohol use.  Stop using drugs, if you use them.  Exercise.  Get plenty of sleep.  Eat healthy.  Spend more time outdoors.  Brain stimulation. This may be done when symptoms are very bad or have not gotten better.  Follow these instructions at home:  Alcohol use  Do not drink alcohol if:  Your health care provider tells you not to drink.  You are pregnant, may be pregnant, or are planning to become pregnant.  If you drink alcohol:  Limit how much you use to:  0-1 drink a day for women.  0-2 drinks a day for men.  Know how much alcohol is in your drink. In the [place] one drink equals one 12 oz bottle of beer (355 mL), one 5 oz glass of wine (148 mL), or one 1½ oz glass of hard liquor (44 mL).  Activity  Exercise as told by your doctor.  Spend time outdoors.  Make time to do the things you enjoy.  Find ways to deal with stress. Try to:  Meditate.  Do deep breathing.  Spend time in nature.  Keep a journal.  Return to your normal activities when your doctor says that it is safe.  General instructions    Take over-the-counter and prescription medicines only as told by your doctor.  Talk to your doctor about:  Alcohol use. It can affect medicines.  Any drug use.  Eat healthy foods.  Get a lot of sleep.  Think about joining a support group. Ask your doctor about that.  Keep all follow-up visits. Your doctor will need to check on your mood, behavior, and medicines, and change your treatment as needed.  Where to find more information:  National [place] on Mental Illness: arpita.org  National [place] of Mental Health: nimh.nih.gov  American Psychiatric Association: psychiatry.org  Contact a doctor if:  You feel worse.  You get new symptoms.  Get help right away if:  You hurt yourself on purpose (self-harm).  You have thoughts about hurting yourself or others.  You see, hear, taste, smell, or feel things that are not there.  Get help right away if you feel like you may hurt yourself or others, or have thoughts about taking  your own life. Go to your nearest emergency room or:  Call 911.  Call the National Suicide Prevention Lifeline at 1-376.279.7011 or 792. This is open 24 hours a day.  Text the Crisis Text Line at 160340.  This information is not intended to replace advice given to you by your health care provider. Make sure you discuss any questions you have with your health care provider.  Document Revised: 04/25/2023 Document Reviewed: 04/25/2023  Elsevier Patient Education © 2023 [employer]  Generalized Anxiety Disorder, Adult  Generalized anxiety disorder (CASIE) is a mental health condition. Unlike normal worries, anxiety related to CASIE is not triggered by a specific event. These worries do not fade or get better with time. CASIE interferes with relationships, work, and school.  CASIE symptoms can vary from mild to severe. People with severe CASIE can have intense waves of anxiety with physical symptoms that are similar to panic attacks.  What are the causes?  The exact cause of CASIE is not known, but the following are believed to have an impact:  Differences in natural brain chemicals.  Genes passed down from parents to children.  Differences in the way threats are perceived.  Development and stress during childhood.  Personality.  What increases the risk?  The following factors may make you more likely to develop this condition:  Being female.  Having a family history of anxiety disorders.  Being very shy.  Experiencing very stressful life events, such as the death of a loved one.  Having a very stressful family environment.  What are the signs or symptoms?  People with CASIE often worry excessively about many things in their lives, such as their health and family. Symptoms may also include:  Mental and emotional symptoms:  Worrying excessively about natural disasters.  Fear of being late.  Difficulty concentrating.  Fears that others are judging your performance.  Physical symptoms:  Fatigue.  Headaches, muscle tension, muscle  twitches, trembling, or feeling shaky.  Feeling like your heart is pounding or beating very fast.  Feeling out of breath or like you cannot take a deep breath.  Having trouble falling asleep or staying asleep, or experiencing restlessness.  Sweating.  Nausea, diarrhea, or irritable bowel syndrome (IBS).  Behavioral symptoms:  Experiencing erratic moods or irritability.  Avoidance of new situations.  Avoidance of people.  Extreme difficulty making decisions.  How is this diagnosed?  This condition is diagnosed based on your symptoms and medical history. You will also have a physical exam. Your health care provider may perform tests to rule out other possible causes of your symptoms.  To be diagnosed with CASIE, a person must have anxiety that:  Is out of his or her control.  Affects several different aspects of his or her life, such as work and relationships.  Causes distress that makes him or her unable to take part in normal activities.  Includes at least three symptoms of CASIE, such as restlessness, fatigue, trouble concentrating, irritability, muscle tension, or sleep problems.  Before your health care provider can confirm a diagnosis of CASIE, these symptoms must be present more days than they are not, and they must last for 6 months or longer.  How is this treated?  This condition may be treated with:  Medicine. Antidepressant medicine is usually prescribed for long-term daily control. Anti-anxiety medicines may be added in severe cases, especially when panic attacks occur.  Talk therapy (psychotherapy). Certain types of talk therapy can be helpful in treating CASIE by providing support, education, and guidance. Options include:  Cognitive behavioral therapy (CBT). People learn coping skills and self-calming techniques to ease their physical symptoms. They learn to identify unrealistic thoughts and behaviors and to replace them with more appropriate thoughts and behaviors.  Acceptance and commitment therapy (ACT).  This treatment teaches people how to be mindful as a way to cope with unwanted thoughts and feelings.  Biofeedback. This process trains you to manage your body's response (physiological response) through breathing techniques and relaxation methods. You will work with a therapist while machines are used to monitor your physical symptoms.  Stress management techniques. These include yoga, meditation, and exercise.  A mental health specialist can help determine which treatment is best for you. Some people see improvement with one type of therapy. However, other people require a combination of therapies.  Follow these instructions at home:  Lifestyle  Maintain a consistent routine and schedule.  Anticipate stressful situations. Create a plan and allow extra time to work with your plan.  Practice stress management or self-calming techniques that you have learned from your therapist or your health care provider.  Exercise regularly and spend time outdoors.  Eat a healthy diet that includes plenty of vegetables, fruits, whole grains, low-fat dairy products, and lean protein.  Do not eat a lot of foods that are high in fat, added sugar, or salt (sodium).  Drink plenty of water.  Avoid alcohol. Alcohol can increase anxiety.  Avoid caffeine and certain over-the-counter cold medicines. These may make you feel worse. Ask your pharmacist which medicines to avoid.  General instructions  Take over-the-counter and prescription medicines only as told by your health care provider.  Understand that you are likely to have setbacks. Accept this and be kind to yourself as you persist to take better care of yourself.  Anticipate stressful situations. Create a plan and allow extra time to work with your plan.  Recognize and accept your accomplishments, even if you  them as small.  Spend time with people who care about you.  Keep all follow-up visits. This is important.  Where to find more information  National [place] of Mental  Health: www.[facility].gov  Substance Abuse and Mental Health Services: www.[facility].gov  Contact a health care provider if:  Your symptoms do not get better.  Your symptoms get worse.  You have signs of depression, such as:  A persistently sad or irritable mood.  Loss of enjoyment in activities that used to bring you remigio.  Change in weight or eating.  Changes in sleeping habits.  Get help right away if:  You have thoughts about hurting yourself or others.  If you ever feel like you may hurt yourself or others, or have thoughts about taking your own life, get help right away. Go to your nearest emergency department or:  Call your local emergency services (060 in the [place]  Call a suicide crisis helpline, such as the National Suicide Prevention Lifeline at 1-167.785.7219 or 011 in the [place] This is open 24 hours a day in the [place]  Text the Crisis Text Line at 646902 (in the [place]  Summary  Generalized anxiety disorder (CASIE) is a mental health condition that involves worry that is not triggered by a specific event.  People with CASIE often worry excessively about many things in their lives, such as their health and family.  CASIE may cause symptoms such as restlessness, trouble concentrating, sleep problems, frequent sweating, nausea, diarrhea, headaches, and trembling or muscle twitching.  A mental health specialist can help determine which treatment is best for you. Some people see improvement with one type of therapy. However, other people require a combination of therapies.  This information is not intended to replace advice given to you by your health care provider. Make sure you discuss any questions you have with your health care provider.  Document Revised: 07/13/2022 Document Reviewed: 04/10/2022  Elsevier Patient Education © 2023 Elsevier Inc.  Smoking Tobacco Information, Adult  Smoking tobacco can be harmful to your health. Tobacco contains a toxic colorless chemical called nicotine. Nicotine causes changes in  "your brain that make you want more and more. This is called addiction. This can make it hard to stop smoking once you start. Tobacco also has other toxic chemicals that can hurt your body and raise your risk of many cancers.  Menthol or \"lite\" tobacco or cigarette brands are not safer than regular brands.  How can smoking tobacco affect me?  Smoking tobacco puts you at risk for:  Cancer. Smoking is most commonly associated with lung cancer, but can also lead to cancer in other parts of the body.  Chronic obstructive pulmonary disease (COPD). This is a long-term lung condition that makes it hard to breathe. It also gets worse over time.  High blood pressure (hypertension), heart disease, stroke, heart attack, and lung infections, such as pneumonia.  Cataracts. This is when the lenses in the eyes become clouded.  Digestive problems. This may include peptic ulcers, heartburn, and gastroesophageal reflux disease (GERD).  Oral health problems, such as gum disease, mouth sores, and tooth loss.  Loss of taste and smell.  Smoking also affects how you look and smell. Smoking may cause:  Wrinkles.  Yellow or stained teeth, fingers, and fingernails.  Bad breath.  Bad-smelling clothes and hair.  Smoking tobacco can also affect your social life, because:  It may be challenging to find places to smoke when away from home. Many workplaces, restaurants, hotels, and public places are tobacco-free.  Smoking is expensive. This is due to the cost of tobacco and the long-term costs of treating health problems from smoking.  Secondhand smoke may affect those around you. Secondhand smoke can cause lung cancer, breathing problems, and heart disease. Children of smokers have a higher risk for:  Sudden infant death syndrome (SIDS).  Ear infections.  Lung infections.  What actions can I take to prevent health problems?  Quit smoking    Do not start smoking. Quit if you already smoke.  Do not replace cigarette smoking with vaping devices, such " as e-cigarettes.  Make a plan to quit smoking and commit to it. Look for programs to help you, and ask your health care provider for recommendations and ideas. Set a date and write down all the reasons you want to quit.  Let your friends and family know you are quitting so they can help and support you. Consider finding friends who also want to quit. It can be easier to quit with someone else, so that you can support each other.  Talk with your health care provider about using nicotine replacement medicines to help you quit. These include gum, lozenges, patches, sprays, or pills.  If you try to quit but return to smoking, stay positive. It is common to slip up when you first quit, so take it one day at a time.  Be prepared for cravings. When you feel the urge to smoke, chew gum or suck on hard candy.  Lifestyle  Stay busy.  Take care of your body. Get plenty of exercise, eat a healthy diet, and drink plenty of water.  Find ways to manage your stress, such as meditation, yoga, exercise, or time spent with friends and family.  Ask your health care provider about having regular tests (screenings) to check for cancer. This may include blood tests, imaging tests, and other tests.  Where to find support  To get support to quit smoking, consider:  Asking your health care provider for more information and resources.  Joining a support group for people who want to quit smoking in your local community. There are many effective programs that may help you to quit.  Calling the smokefree.gov counselor helpline at 7-800-QUIT-NOW (1-511.795.9795).  Where to find more information  You may find more information about quitting smoking from:  Centers for Disease Control and Prevention: cdc.gov/tobacco  Smokefree.gov: smokefree.gov  American Lung Association: freedomfromsmoking.org  Contact a health care provider if:  You have problems breathing.  Your lips, nose, or fingers turn blue.  You have chest pain.  You are coughing up  blood.  You feel like you will faint.  You have other health changes that cause you to worry.  Summary  Smoking tobacco can negatively affect your health, the health of those around you, your finances, and your social life.  Do not start smoking. Quit if you already smoke. If you need help quitting, ask your health care provider.  Consider joining a support group for people in your local community who want to quit smoking. There are many effective programs that may help you to quit.  This information is not intended to replace advice given to you by your health care provider. Make sure you discuss any questions you have with your health care provider.  Document Revised: 12/13/2022 Document Reviewed: 12/13/2022  Elsevier Patient Education © 2023 Elsevier Inc.

## 2024-02-13 ENCOUNTER — OFFICE VISIT (OUTPATIENT)
Dept: FAMILY MEDICINE CLINIC | Facility: CLINIC | Age: 44
End: 2024-02-13
Payer: COMMERCIAL

## 2024-02-13 VITALS
WEIGHT: 261 LBS | SYSTOLIC BLOOD PRESSURE: 100 MMHG | HEART RATE: 78 BPM | BODY MASS INDEX: 37.37 KG/M2 | OXYGEN SATURATION: 96 % | DIASTOLIC BLOOD PRESSURE: 70 MMHG | HEIGHT: 70 IN

## 2024-02-13 DIAGNOSIS — R11.0 NAUSEA: ICD-10-CM

## 2024-02-13 DIAGNOSIS — F41.9 ANXIETY: Primary | ICD-10-CM

## 2024-02-13 PROBLEM — I10 PRIMARY HYPERTENSION: Status: ACTIVE | Noted: 2024-02-13

## 2024-02-13 PROCEDURE — 99213 OFFICE O/P EST LOW 20 MIN: CPT | Performed by: FAMILY MEDICINE

## 2024-02-13 RX ORDER — ESCITALOPRAM OXALATE 20 MG/1
20 TABLET ORAL DAILY
Qty: 90 TABLET | Refills: 0 | Status: SHIPPED | OUTPATIENT
Start: 2024-02-13 | End: 2024-05-13

## 2024-02-13 RX ORDER — ONDANSETRON HYDROCHLORIDE 8 MG/1
8 TABLET, FILM COATED ORAL EVERY 8 HOURS PRN
Qty: 15 TABLET | Refills: 0 | Status: SHIPPED | OUTPATIENT
Start: 2024-02-13 | End: 2024-02-18

## 2024-04-09 ENCOUNTER — OFFICE VISIT (OUTPATIENT)
Dept: CARDIOLOGY | Facility: CLINIC | Age: 44
End: 2024-04-09
Payer: COMMERCIAL

## 2024-04-09 VITALS
HEIGHT: 70 IN | BODY MASS INDEX: 37.11 KG/M2 | HEART RATE: 74 BPM | DIASTOLIC BLOOD PRESSURE: 68 MMHG | WEIGHT: 259.2 LBS | SYSTOLIC BLOOD PRESSURE: 118 MMHG

## 2024-04-09 DIAGNOSIS — F17.210 CIGARETTE NICOTINE DEPENDENCE WITHOUT COMPLICATION: ICD-10-CM

## 2024-04-09 DIAGNOSIS — I10 PRIMARY HYPERTENSION: Primary | ICD-10-CM

## 2024-04-09 DIAGNOSIS — R07.89 CHEST PAIN, ATYPICAL: ICD-10-CM

## 2024-04-09 DIAGNOSIS — E66.9 OBESITY (BMI 30-39.9): ICD-10-CM

## 2024-04-09 DIAGNOSIS — B18.2 CHRONIC HEPATITIS C WITHOUT HEPATIC COMA: ICD-10-CM

## 2024-04-09 PROCEDURE — 99214 OFFICE O/P EST MOD 30 MIN: CPT | Performed by: NURSE PRACTITIONER

## 2024-04-09 RX ORDER — OMEPRAZOLE 20 MG/1
20 CAPSULE, DELAYED RELEASE ORAL AS NEEDED
COMMUNITY

## 2024-04-09 NOTE — PROGRESS NOTES
Chief Complaint   Patient presents with    Follow-up     Cardiac management    LABS     Had labs , results on chart    Chest discomfort     Has episodes that starts as heartburn then has  pain at left ribcage and radiates to chest ,causing pressure . These episodes usually happen at night and she  has been awakened with discomfort . Episode will last for 3-4 hours     Med Refill     No refills needed today.       Subjective       Beverley Tovar is a 43 y.o. female initially seen 2023 for cardiac evaluation of CP.  History includes IV drug use and hep C.  Repeat labs revealed chronic hep C.  Echocardiogram showed normal LV size and function with a EF around 65% and no significant valve issues.  RVSP was 29 mmHg. Abdominal ultrasound showed cholelithiasis, common bile duct dilation, with recommendation for HIDA scan and possible surgical consult.  Exercise treadmill stress test was negative for arrhythmia or ischemia.  Toprol-XL was added for heart rate and BP management.    Today she returns the office for follow-up visit.  She is taking Toprol-XL 25 mg at night without issue.  Blood pressure and heart rate have been good when checked.  Her main concern is discomfort in her left chest area that occurs in the evenings and sometimes awakens her at night.  According to patient she was scheduled to see general surgeon regarding gallstones but was unable to keep the appointment and plans to reschedule.  She is maintaining her normal daily activities without chest pain, increase shortness of breath, or dizziness.    Cardiac History:    Past Surgical History:   Procedure Laterality Date    ANKLE OPEN REDUCTION INTERNAL FIXATION Right 2018    Procedure: RIGHT ANKLE OPEN REDUCTION INTERNAL FIXATION;  Surgeon: Hira Jackson MD;  Location: [facility];  Service:     CARDIOVASCULAR STRESS TEST  2023    R.Stress. 6.07 Min. 7.3 METS. 91% THR. 143/75. Negative     SECTION      ECHO -  CONVERTED  2023    TLS. EF 65%. Trace MR. RVSP- 29 mmHg    EYE SURGERY      FRACTURE SURGERY      TUBAL ABDOMINAL LIGATION         Current Outpatient Medications   Medication Sig Dispense Refill    buprenorphine-naloxone (SUBOXONE) 8-2 MG per SL tablet Place 1.5 tablets under the tongue Daily.      escitalopram (Lexapro) 20 MG tablet Take 1 tablet by mouth Daily for 90 days. 90 tablet 0    metoprolol succinate XL (TOPROL-XL) 25 MG 24 hr tablet Take 1 tablet by mouth Daily. 30 tablet 11    omeprazole (priLOSEC) 20 MG capsule Take 1 capsule by mouth As Needed.       No current facility-administered medications for this visit.       Patient has no known allergies.    Past Medical History:   Diagnosis Date    Anxiety     Depression     Hepatitis C     History of alcohol abuse     History of drug abuse     Liver disease     Low back pain     Obesity        Social History     Socioeconomic History    Marital status: Single   Tobacco Use    Smoking status: Every Day     Current packs/day: 0.50     Average packs/day: 0.5 packs/day for 25.0 years (12.5 ttl pk-yrs)     Types: Cigarettes    Smokeless tobacco: Never   Vaping Use    Vaping status: Former    Substances: Nicotine, Flavoring   Substance and Sexual Activity    Alcohol use: Not Currently    Drug use: Not Currently     Types: Heroin, Hydrocodone, Marijuana, Oxycodone     Comment: last used     Sexual activity: Yes     Partners: Male     Birth control/protection: Tubal ligation       Family History   Problem Relation Age of Onset    Depression Mother     Anxiety disorder Mother     Diabetes Mother         Both parents    Stroke Mother          at 56    Hyperlipidemia Father     Hypertension Father     Diabetes Father     COPD Father     Heart disease Father         CABG    Heart attack Father         MI in his 50's    Depression Sister     Depression Sister     Depression Sister     Other Other         grandparents medical history unknown    No Known  "Problems Daughter     No Known Problems Daughter        Review of Systems   Constitutional: Negative for diaphoresis and fever.   Cardiovascular:  Positive for chest pain. Negative for leg swelling, near-syncope and palpitations.   Respiratory:  Negative for shortness of breath and sleep disturbances due to breathing.    Endocrine: Negative for polydipsia, polyphagia and polyuria.   Hematologic/Lymphatic: Negative for bleeding problem.   Skin:  Negative for color change.   Neurological:  Negative for dizziness and weakness.   Psychiatric/Behavioral:  Negative for memory loss. The patient is nervous/anxious.         BP Readings from Last 5 Encounters:   04/09/24 118/68   02/13/24 100/70   11/07/23 126/82   10/02/23 142/96   09/20/23 142/94       Wt Readings from Last 5 Encounters:   04/09/24 118 kg (259 lb 3.2 oz)   02/13/24 118 kg (261 lb)   11/07/23 115 kg (254 lb 6.4 oz)   10/02/23 118 kg (261 lb)   09/20/23 117 kg (259 lb)       Objective     Labs 9/27/2023: Hepatitis panel, acute nonreactive except hepatitis C AB.  CMP in normal range, CRP 0.332, total cholesterol 156, triglycerides 107, HDL 44, LDL 92, amylase 39, lipase 22, H. pylori IgA, IgG elevated CBC in normal range.    /68 (BP Location: Left arm, Patient Position: Sitting)   Pulse 74   Ht 177.8 cm (70\")   Wt 118 kg (259 lb 3.2 oz)   BMI 37.19 kg/m²     Vitals and nursing note reviewed.   Constitutional:       Appearance: Not in distress. Obese.   Eyes:      Conjunctiva/sclera: Conjunctivae normal.      Pupils: Pupils are equal, round, and reactive to light.   HENT:      Head: Normocephalic.   Pulmonary:      Effort: Pulmonary effort is normal.      Breath sounds: Normal breath sounds.   Cardiovascular:      PMI at left midclavicular line. Normal rate. Regular rhythm.      Murmurs: There is no murmur.   Pulses:     Intact distal pulses.   Edema:     Peripheral edema absent.   Abdominal:      General: Bowel sounds are normal.      Palpations: " Abdomen is soft.   Musculoskeletal: Normal range of motion.      Cervical back: Normal range of motion and neck supple. Skin:     General: Skin is warm and dry.   Neurological:      Mental Status: Alert, oriented to person, place, and time and oriented to person, place and time.   Psychiatric:         Behavior: Behavior is cooperative.          Procedures: None today         Assessment & Plan   Diagnoses and all orders for this visit:    1. Primary hypertension (Primary)    2. Chronic hepatitis C without hepatic coma    3. Obesity (BMI 30-39.9)    4. Cigarette nicotine dependence without complication    5. Chest pain, atypical      Atypical chest pain/Cardiac status  -Results of exercise treadmill stress test and echocardiogram were reviewed: No ischemia or arrhythmia by EKG criteria.  LV function normal and EF 65%.  No significant valvular issues noted.    Abdominal ultrasound-appears to have gallstones.  Patient agrees to call general surgeon office to reschedule follow-up in regards    Lab results reviewed: Chronic hep C, positive antibodies H. pylori.  Consider referral to GI.    HTN  -BP normal, heart rate and rhythm normal  -Continue Toprol XL 25 mg daily    Smoking  -Cessation encouraged    If GI workup is negative and symptoms persist then consider cardiac CTA versus nuclear stress test.    6-month follow-up visit scheduled.               Electronically signed by DANDRE Rubio,  April 9, 2024 10:17 EDT    Dictated Utilizing Dragon Dictation: Part of this note may be an electronic transcription/translation of spoken language to printed text using the Dragon Dictation System.

## 2024-05-13 ENCOUNTER — OFFICE VISIT (OUTPATIENT)
Dept: FAMILY MEDICINE CLINIC | Facility: CLINIC | Age: 44
End: 2024-05-13
Payer: COMMERCIAL

## 2024-05-13 VITALS
SYSTOLIC BLOOD PRESSURE: 126 MMHG | OXYGEN SATURATION: 98 % | HEIGHT: 70 IN | DIASTOLIC BLOOD PRESSURE: 74 MMHG | RESPIRATION RATE: 20 BRPM | WEIGHT: 261.6 LBS | BODY MASS INDEX: 37.45 KG/M2 | TEMPERATURE: 97.3 F | HEART RATE: 82 BPM

## 2024-05-13 DIAGNOSIS — Z12.31 ENCOUNTER FOR SCREENING MAMMOGRAM FOR BREAST CANCER: ICD-10-CM

## 2024-05-13 DIAGNOSIS — F41.9 ANXIETY: Primary | ICD-10-CM

## 2024-05-13 DIAGNOSIS — K80.20 CALCULUS OF GALLBLADDER WITHOUT CHOLECYSTITIS WITHOUT OBSTRUCTION: ICD-10-CM

## 2024-05-13 PROCEDURE — 99214 OFFICE O/P EST MOD 30 MIN: CPT | Performed by: FAMILY MEDICINE

## 2024-05-13 RX ORDER — ESCITALOPRAM OXALATE 20 MG/1
20 TABLET ORAL DAILY
Qty: 90 TABLET | Refills: 1 | Status: SHIPPED | OUTPATIENT
Start: 2024-05-13 | End: 2024-08-11

## 2024-05-13 RX ORDER — PROMETHAZINE HYDROCHLORIDE 25 MG/1
25 TABLET ORAL EVERY 8 HOURS PRN
Qty: 30 TABLET | Refills: 0 | Status: SHIPPED | OUTPATIENT
Start: 2024-05-13 | End: 2024-05-27

## 2024-05-13 NOTE — PROGRESS NOTES
"Chief Complaint  Follow-up (galbladder)    Subjective        Beverley Tovar presents to [facility]  History of Present Illness  Patient is 43 y.o. female who presents today for follow-up on chronic medical problems including cholelithiasis and anxiety. Patient denies adverse effects of medications, headache, dizziness, chest pain, palpitations, shortness of breath, cough, abdominal pain, muscle aches, joint pain, and fatigue. Patient complains of nausea. Patient is here for monitoring of chronic issues due to need for monitoring of renal function, liver function, adverse effects, and side effects. The patient's Lexapro was increased last visit and she states that it seems to helping..        Objective   Vital Signs:  /74 (BP Location: Right arm, Patient Position: Sitting, Cuff Size: Adult)   Pulse 82   Temp 97.3 °F (36.3 °C) (Temporal)   Resp 20   Ht 177.8 cm (70\")   Wt 119 kg (261 lb 9.6 oz)   SpO2 98%   BMI 37.54 kg/m²   Estimated body mass index is 37.54 kg/m² as calculated from the following:    Height as of this encounter: 177.8 cm (70\").    Weight as of this encounter: 119 kg (261 lb 9.6 oz).               Physical Exam  Vitals and nursing note reviewed.   Constitutional:       Appearance: Normal appearance.   HENT:      Head: Normocephalic and atraumatic.      Right Ear: Tympanic membrane and ear canal normal.      Left Ear: Tympanic membrane and ear canal normal.      Nose: Nose normal.      Mouth/Throat:      Mouth: Mucous membranes are moist.   Eyes:      Extraocular Movements: Extraocular movements intact.      Pupils: Pupils are equal, round, and reactive to light.   Cardiovascular:      Rate and Rhythm: Normal rate and regular rhythm. No extrasystoles are present.     Heart sounds: Normal heart sounds. No murmur heard.  Pulmonary:      Effort: Pulmonary effort is normal.      Breath sounds: Normal breath sounds. No decreased breath sounds, wheezing or " rhonchi.   Abdominal:      Palpations: Abdomen is soft. There is no mass.      Tenderness: There is no abdominal tenderness. There is no right CVA tenderness, left CVA tenderness, guarding or rebound.   Musculoskeletal:         General: Normal range of motion.      Cervical back: Normal range of motion and neck supple.      Right lower leg: No edema.      Left lower leg: No edema.   Skin:     Findings: No rash.   Neurological:      General: No focal deficit present.      Mental Status: She is alert and oriented to person, place, and time.      Sensory: Sensation is intact.      Motor: Motor function is intact.      Coordination: Coordination is intact.      Gait: Gait is intact.      Deep Tendon Reflexes: Reflexes are normal and symmetric.   Psychiatric:         Attention and Perception: Attention and perception normal.         Mood and Affect: Mood normal.         Speech: Speech normal.         Behavior: Behavior normal. Behavior is cooperative.         Thought Content: Thought content normal.        Result Review :                     Assessment and Plan     Diagnoses and all orders for this visit:    1. Anxiety (Primary)  -     escitalopram (Lexapro) 20 MG tablet; Take 1 tablet by mouth Daily for 90 days.  Dispense: 90 tablet; Refill: 1    2. Encounter for screening mammogram for breast cancer  -     Mammo Screening Bilateral With CAD; Future    3. Calculus of gallbladder without cholecystitis without obstruction  Assessment & Plan:  Still having some pain and nausea. Has appointment with surgeon. Will refill phenergan.    Orders:  -     promethazine (PHENERGAN) 25 MG tablet; Take 1 tablet by mouth Every 8 (Eight) Hours As Needed for Nausea or Vomiting for up to 14 days.  Dispense: 30 tablet; Refill: 0           I spent 20 minutes caring for Beverley on this date of service. This time includes time spent by me in the following activities:preparing for the visit, performing a medically appropriate examination  and/or evaluation , counseling and educating the patient/family/caregiver, ordering medications, tests, or procedures, and documenting information in the medical record  Follow Up     Return in about 5 months (around 10/13/2024) for Annual physical, Blood work.  Patient was given instructions and counseling regarding her condition or for health maintenance advice. Please see specific information pulled into the AVS if appropriate.     The patient is advised to continue all of her regular medications as prescribed. She was counseled regarding the importance of diet, exercise and medication compliance.        This document has been electronically signed by Chris Bhatia MD  May 14, 2024 00:01 EDT

## 2024-07-23 ENCOUNTER — OFFICE VISIT (OUTPATIENT)
Dept: FAMILY MEDICINE CLINIC | Facility: CLINIC | Age: 44
End: 2024-07-23
Payer: COMMERCIAL

## 2024-07-23 VITALS
WEIGHT: 271.8 LBS | OXYGEN SATURATION: 98 % | DIASTOLIC BLOOD PRESSURE: 70 MMHG | HEIGHT: 70 IN | SYSTOLIC BLOOD PRESSURE: 122 MMHG | RESPIRATION RATE: 18 BRPM | TEMPERATURE: 97.5 F | HEART RATE: 82 BPM | BODY MASS INDEX: 38.91 KG/M2

## 2024-07-23 DIAGNOSIS — Z00.00 ROUTINE GENERAL MEDICAL EXAMINATION AT A HEALTH CARE FACILITY: ICD-10-CM

## 2024-07-23 DIAGNOSIS — M79.89 LEG SWELLING: ICD-10-CM

## 2024-07-23 DIAGNOSIS — R53.83 FATIGUE, UNSPECIFIED TYPE: Primary | ICD-10-CM

## 2024-07-23 LAB
ALBUMIN SERPL-MCNC: 4.1 G/DL (ref 3.5–5.2)
ALBUMIN/GLOB SERPL: 1.4 G/DL
ALP SERPL-CCNC: 85 U/L (ref 39–117)
ALT SERPL W P-5'-P-CCNC: 71 U/L (ref 1–33)
ANION GAP SERPL CALCULATED.3IONS-SCNC: 9.3 MMOL/L (ref 5–15)
AST SERPL-CCNC: 50 U/L (ref 1–32)
BASOPHILS # BLD AUTO: 0.04 10*3/MM3 (ref 0–0.2)
BASOPHILS NFR BLD AUTO: 0.6 % (ref 0–1.5)
BILIRUB BLD-MCNC: NEGATIVE MG/DL
BILIRUB SERPL-MCNC: 0.5 MG/DL (ref 0–1.2)
BUN SERPL-MCNC: 11 MG/DL (ref 6–20)
BUN/CREAT SERPL: 16.2 (ref 7–25)
CALCIUM SPEC-SCNC: 9.1 MG/DL (ref 8.6–10.5)
CHLORIDE SERPL-SCNC: 104 MMOL/L (ref 98–107)
CHOLEST SERPL-MCNC: 170 MG/DL (ref 0–200)
CLARITY, POC: CLEAR
CO2 SERPL-SCNC: 25.7 MMOL/L (ref 22–29)
COLOR UR: YELLOW
CREAT SERPL-MCNC: 0.68 MG/DL (ref 0.57–1)
DEPRECATED RDW RBC AUTO: 45 FL (ref 37–54)
EGFRCR SERPLBLD CKD-EPI 2021: 110.3 ML/MIN/1.73
EOSINOPHIL # BLD AUTO: 0.12 10*3/MM3 (ref 0–0.4)
EOSINOPHIL NFR BLD AUTO: 1.7 % (ref 0.3–6.2)
ERYTHROCYTE [DISTWIDTH] IN BLOOD BY AUTOMATED COUNT: 12.6 % (ref 12.3–15.4)
GLOBULIN UR ELPH-MCNC: 3 GM/DL
GLUCOSE SERPL-MCNC: 79 MG/DL (ref 65–99)
GLUCOSE UR STRIP-MCNC: NEGATIVE MG/DL
HCT VFR BLD AUTO: 46 % (ref 34–46.6)
HCV AB SER QL: REACTIVE
HDLC SERPL-MCNC: 41 MG/DL (ref 40–60)
HGB BLD-MCNC: 15.2 G/DL (ref 12–15.9)
IMM GRANULOCYTES # BLD AUTO: 0.03 10*3/MM3 (ref 0–0.05)
IMM GRANULOCYTES NFR BLD AUTO: 0.4 % (ref 0–0.5)
KETONES UR QL: NEGATIVE
LDLC SERPL CALC-MCNC: 107 MG/DL (ref 0–100)
LDLC/HDLC SERPL: 2.56 {RATIO}
LEUKOCYTE EST, POC: NEGATIVE
LYMPHOCYTES # BLD AUTO: 1.73 10*3/MM3 (ref 0.7–3.1)
LYMPHOCYTES NFR BLD AUTO: 24.3 % (ref 19.6–45.3)
MCH RBC QN AUTO: 31.6 PG (ref 26.6–33)
MCHC RBC AUTO-ENTMCNC: 33 G/DL (ref 31.5–35.7)
MCV RBC AUTO: 95.6 FL (ref 79–97)
MONOCYTES # BLD AUTO: 0.4 10*3/MM3 (ref 0.1–0.9)
MONOCYTES NFR BLD AUTO: 5.6 % (ref 5–12)
NEUTROPHILS NFR BLD AUTO: 4.8 10*3/MM3 (ref 1.7–7)
NEUTROPHILS NFR BLD AUTO: 67.4 % (ref 42.7–76)
NITRITE UR-MCNC: NEGATIVE MG/ML
NRBC BLD AUTO-RTO: 0 /100 WBC (ref 0–0.2)
PH UR: 6.5 [PH] (ref 5–8)
PLATELET # BLD AUTO: 226 10*3/MM3 (ref 140–450)
PMV BLD AUTO: 9.8 FL (ref 6–12)
POTASSIUM SERPL-SCNC: 4.2 MMOL/L (ref 3.5–5.2)
PROT SERPL-MCNC: 7.1 G/DL (ref 6–8.5)
PROT UR STRIP-MCNC: NEGATIVE MG/DL
RBC # BLD AUTO: 4.81 10*6/MM3 (ref 3.77–5.28)
RBC # UR STRIP: NEGATIVE /UL
SODIUM SERPL-SCNC: 139 MMOL/L (ref 136–145)
SP GR UR: 1.02 (ref 1–1.03)
TRIGL SERPL-MCNC: 120 MG/DL (ref 0–150)
TSH SERPL DL<=0.05 MIU/L-ACNC: 1.41 UIU/ML (ref 0.27–4.2)
UROBILINOGEN UR QL: NORMAL
VLDLC SERPL-MCNC: 22 MG/DL (ref 5–40)
WBC NRBC COR # BLD AUTO: 7.12 10*3/MM3 (ref 3.4–10.8)

## 2024-07-23 PROCEDURE — 80050 GENERAL HEALTH PANEL: CPT | Performed by: FAMILY MEDICINE

## 2024-07-23 PROCEDURE — 80061 LIPID PANEL: CPT | Performed by: FAMILY MEDICINE

## 2024-07-23 PROCEDURE — 86803 HEPATITIS C AB TEST: CPT | Performed by: FAMILY MEDICINE

## 2024-07-24 NOTE — PROGRESS NOTES
"Chief Complaint  Follow-up (Brain fog/Extreme fatigue/Bilateral leg swelling an stomach x 2 weeks)    Subjective        Beverley Tovar presents to [facility]  History of Present Illness  The patient is a 44 y.o. female who is here today because of swelling of the legs and abdomen for 2 weeks. The patient is also complaining of fatigue during this time. The patient had a complete blood work requested last October that she has not gotten done.      Objective   Vital Signs:  /70 (BP Location: Right arm, Patient Position: Sitting, Cuff Size: Adult)   Pulse 82   Temp 97.5 °F (36.4 °C) (Temporal)   Resp 18   Ht 177.8 cm (70\")   Wt 123 kg (271 lb 12.8 oz)   SpO2 98%   BMI 39.00 kg/m²   Estimated body mass index is 39 kg/m² as calculated from the following:    Height as of this encounter: 177.8 cm (70\").    Weight as of this encounter: 123 kg (271 lb 12.8 oz).               Physical Exam  Vitals and nursing note reviewed.   Constitutional:       Appearance: Normal appearance. She is obese.   HENT:      Head: Normocephalic and atraumatic.      Right Ear: Tympanic membrane and ear canal normal.      Left Ear: Tympanic membrane and ear canal normal.      Nose: Nose normal.      Mouth/Throat:      Mouth: Mucous membranes are moist.   Eyes:      Extraocular Movements: Extraocular movements intact.      Pupils: Pupils are equal, round, and reactive to light.   Cardiovascular:      Rate and Rhythm: Normal rate and regular rhythm. No extrasystoles are present.     Heart sounds: Normal heart sounds. No murmur heard.  Pulmonary:      Effort: Pulmonary effort is normal.      Breath sounds: Normal breath sounds. No decreased breath sounds, wheezing or rhonchi.   Abdominal:      General: Abdomen is protuberant. Bowel sounds are normal.      Palpations: Abdomen is soft. There is no shifting dullness, hepatomegaly or mass.      Tenderness: There is no abdominal tenderness. There is no right " CVA tenderness, left CVA tenderness, guarding or rebound.   Musculoskeletal:         General: Normal range of motion.      Cervical back: Normal range of motion and neck supple.      Right lower leg: No edema.      Left lower leg: No edema.   Skin:     Findings: No rash.   Neurological:      General: No focal deficit present.      Mental Status: She is alert and oriented to person, place, and time.      Sensory: Sensation is intact.      Motor: Motor function is intact.      Coordination: Coordination is intact.      Gait: Gait is intact.      Deep Tendon Reflexes: Reflexes are normal and symmetric.   Psychiatric:         Attention and Perception: Attention and perception normal.         Mood and Affect: Mood normal.         Speech: Speech normal.         Behavior: Behavior normal. Behavior is cooperative.         Thought Content: Thought content normal.        Result Review :                     Assessment and Plan     Diagnoses and all orders for this visit:    1. Fatigue, unspecified type (Primary)    2. Leg swelling    3. Routine general medical examination at a health care facility  Comments:  ordered from her last visit  Orders:  -     Hepatitis C Antibody  -     TSH  -     Lipid Panel  -     Comprehensive Metabolic Panel  -     CBC & Differential  -     POC Urinalysis Dipstick           I spent 20 minutes caring for Beverley on this date of service. This time includes time spent by me in the following activities:preparing for the visit, performing a medically appropriate examination and/or evaluation , counseling and educating the patient/family/caregiver, ordering medications, tests, or procedures, and documenting information in the medical record  Follow Up     No follow-ups on file.  Patient was given instructions and counseling regarding her condition or for health maintenance advice. Please see specific information pulled into the AVS if appropriate.     The patient is advised to continue all of her  regular medications as prescribed. She was counseled regarding the importance of diet, exercise and medication compliance.      This document has been electronically signed by Chris Bhatia MD  July 23, 2024 23:42 EDT

## 2024-08-08 DIAGNOSIS — B18.2 HEPATITIS C CARRIER: Primary | ICD-10-CM

## 2024-08-08 DIAGNOSIS — B19.20 HEPATITIS C VIRUS INFECTION WITHOUT HEPATIC COMA, UNSPECIFIED CHRONICITY: ICD-10-CM

## 2024-10-14 ENCOUNTER — OFFICE VISIT (OUTPATIENT)
Dept: FAMILY MEDICINE CLINIC | Age: 44
End: 2024-10-14
Payer: COMMERCIAL

## 2024-10-14 VITALS
TEMPERATURE: 98.2 F | WEIGHT: 291.8 LBS | DIASTOLIC BLOOD PRESSURE: 78 MMHG | BODY MASS INDEX: 41.78 KG/M2 | SYSTOLIC BLOOD PRESSURE: 112 MMHG | HEIGHT: 70 IN | HEART RATE: 86 BPM | RESPIRATION RATE: 20 BRPM

## 2024-10-14 DIAGNOSIS — R11.0 NAUSEA: ICD-10-CM

## 2024-10-14 DIAGNOSIS — Z00.00 ROUTINE GENERAL MEDICAL EXAMINATION AT A HEALTH CARE FACILITY: Primary | ICD-10-CM

## 2024-10-14 DIAGNOSIS — E66.813 CLASS 3 SEVERE OBESITY WITH SERIOUS COMORBIDITY AND BODY MASS INDEX (BMI) OF 40.0 TO 44.9 IN ADULT, UNSPECIFIED OBESITY TYPE: ICD-10-CM

## 2024-10-14 DIAGNOSIS — F41.9 ANXIETY: ICD-10-CM

## 2024-10-14 DIAGNOSIS — F11.20 OPIOID DEPENDENCE ON AGONIST THERAPY: ICD-10-CM

## 2024-10-14 DIAGNOSIS — F17.210 CIGARETTE NICOTINE DEPENDENCE WITHOUT COMPLICATION: ICD-10-CM

## 2024-10-14 DIAGNOSIS — E66.01 CLASS 3 SEVERE OBESITY WITH SERIOUS COMORBIDITY AND BODY MASS INDEX (BMI) OF 40.0 TO 44.9 IN ADULT, UNSPECIFIED OBESITY TYPE: ICD-10-CM

## 2024-10-14 DIAGNOSIS — I10 PRIMARY HYPERTENSION: ICD-10-CM

## 2024-10-14 LAB
ALBUMIN SERPL-MCNC: 3.9 G/DL (ref 3.5–5.2)
ALBUMIN/GLOB SERPL: 1.2 G/DL
ALP SERPL-CCNC: 69 U/L (ref 39–117)
ALT SERPL W P-5'-P-CCNC: 80 U/L (ref 1–33)
ANION GAP SERPL CALCULATED.3IONS-SCNC: 10.9 MMOL/L (ref 5–15)
AST SERPL-CCNC: 49 U/L (ref 1–32)
BASOPHILS # BLD AUTO: 0.05 10*3/MM3 (ref 0–0.2)
BASOPHILS NFR BLD AUTO: 0.6 % (ref 0–1.5)
BILIRUB BLD-MCNC: NEGATIVE MG/DL
BILIRUB SERPL-MCNC: 0.5 MG/DL (ref 0–1.2)
BUN SERPL-MCNC: 12 MG/DL (ref 6–20)
BUN/CREAT SERPL: 20.7 (ref 7–25)
CALCIUM SPEC-SCNC: 8.7 MG/DL (ref 8.6–10.5)
CHLORIDE SERPL-SCNC: 105 MMOL/L (ref 98–107)
CHOLEST SERPL-MCNC: 168 MG/DL (ref 0–200)
CLARITY, POC: CLEAR
CO2 SERPL-SCNC: 24.1 MMOL/L (ref 22–29)
COLOR UR: YELLOW
CREAT SERPL-MCNC: 0.58 MG/DL (ref 0.57–1)
DEPRECATED RDW RBC AUTO: 43.3 FL (ref 37–54)
EGFRCR SERPLBLD CKD-EPI 2021: 114.6 ML/MIN/1.73
EOSINOPHIL # BLD AUTO: 0.15 10*3/MM3 (ref 0–0.4)
EOSINOPHIL NFR BLD AUTO: 1.8 % (ref 0.3–6.2)
ERYTHROCYTE [DISTWIDTH] IN BLOOD BY AUTOMATED COUNT: 12.2 % (ref 12.3–15.4)
EXPIRATION DATE: NORMAL
GLOBULIN UR ELPH-MCNC: 3.3 GM/DL
GLUCOSE SERPL-MCNC: 105 MG/DL (ref 65–99)
GLUCOSE UR STRIP-MCNC: NEGATIVE MG/DL
HBA1C MFR BLD: 5.2 % (ref 4.5–5.7)
HCT VFR BLD AUTO: 46.6 % (ref 34–46.6)
HCV AB SER QL: REACTIVE
HDLC SERPL-MCNC: 46 MG/DL (ref 40–60)
HGB BLD-MCNC: 15.2 G/DL (ref 12–15.9)
HYPOCHROMIA BLD QL: NORMAL
IMM GRANULOCYTES # BLD AUTO: 0.04 10*3/MM3 (ref 0–0.05)
IMM GRANULOCYTES NFR BLD AUTO: 0.5 % (ref 0–0.5)
KETONES UR QL: NEGATIVE
LARGE PLATELETS: NORMAL
LDLC SERPL CALC-MCNC: 103 MG/DL (ref 0–100)
LDLC/HDLC SERPL: 2.2 {RATIO}
LEUKOCYTE EST, POC: NEGATIVE
LYMPHOCYTES # BLD AUTO: 2.18 10*3/MM3 (ref 0.7–3.1)
LYMPHOCYTES NFR BLD AUTO: 25.7 % (ref 19.6–45.3)
Lab: NORMAL
MACROCYTES BLD QL SMEAR: NORMAL
MCH RBC QN AUTO: 31.3 PG (ref 26.6–33)
MCHC RBC AUTO-ENTMCNC: 32.6 G/DL (ref 31.5–35.7)
MCV RBC AUTO: 95.9 FL (ref 79–97)
MONOCYTES # BLD AUTO: 0.62 10*3/MM3 (ref 0.1–0.9)
MONOCYTES NFR BLD AUTO: 7.3 % (ref 5–12)
NEUTROPHILS NFR BLD AUTO: 5.44 10*3/MM3 (ref 1.7–7)
NEUTROPHILS NFR BLD AUTO: 64.1 % (ref 42.7–76)
NITRITE UR-MCNC: NEGATIVE MG/ML
NRBC BLD AUTO-RTO: 0 /100 WBC (ref 0–0.2)
PH UR: 6 [PH] (ref 5–8)
PLATELET # BLD AUTO: 226 10*3/MM3 (ref 140–450)
PMV BLD AUTO: 10.1 FL (ref 6–12)
POTASSIUM SERPL-SCNC: 4.2 MMOL/L (ref 3.5–5.2)
PROT SERPL-MCNC: 7.2 G/DL (ref 6–8.5)
PROT UR STRIP-MCNC: NEGATIVE MG/DL
RBC # BLD AUTO: 4.86 10*6/MM3 (ref 3.77–5.28)
RBC # UR STRIP: NEGATIVE /UL
SODIUM SERPL-SCNC: 140 MMOL/L (ref 136–145)
SP GR UR: 1.02 (ref 1–1.03)
TRIGL SERPL-MCNC: 104 MG/DL (ref 0–150)
TSH SERPL DL<=0.05 MIU/L-ACNC: 2.71 UIU/ML (ref 0.27–4.2)
UROBILINOGEN UR QL: NORMAL
VLDLC SERPL-MCNC: 19 MG/DL (ref 5–40)
WBC NRBC COR # BLD AUTO: 8.48 10*3/MM3 (ref 3.4–10.8)

## 2024-10-14 PROCEDURE — 80061 LIPID PANEL: CPT | Performed by: FAMILY MEDICINE

## 2024-10-14 PROCEDURE — 85007 BL SMEAR W/DIFF WBC COUNT: CPT | Performed by: FAMILY MEDICINE

## 2024-10-14 PROCEDURE — 99396 PREV VISIT EST AGE 40-64: CPT | Performed by: FAMILY MEDICINE

## 2024-10-14 PROCEDURE — 86803 HEPATITIS C AB TEST: CPT | Performed by: FAMILY MEDICINE

## 2024-10-14 PROCEDURE — 81002 URINALYSIS NONAUTO W/O SCOPE: CPT | Performed by: FAMILY MEDICINE

## 2024-10-14 PROCEDURE — 80050 GENERAL HEALTH PANEL: CPT | Performed by: FAMILY MEDICINE

## 2024-10-14 PROCEDURE — 83036 HEMOGLOBIN GLYCOSYLATED A1C: CPT | Performed by: FAMILY MEDICINE

## 2024-10-14 RX ORDER — PROMETHAZINE HYDROCHLORIDE 25 MG/1
25 TABLET ORAL EVERY 8 HOURS PRN
Qty: 30 TABLET | Refills: 3 | Status: SHIPPED | OUTPATIENT
Start: 2024-10-14 | End: 2024-10-28

## 2024-10-14 RX ORDER — ESCITALOPRAM OXALATE 20 MG/1
20 TABLET ORAL DAILY
Qty: 90 TABLET | Refills: 3 | Status: SHIPPED | OUTPATIENT
Start: 2024-10-14 | End: 2025-01-12

## 2024-10-14 RX ORDER — METOPROLOL SUCCINATE 25 MG/1
25 TABLET, EXTENDED RELEASE ORAL DAILY
Qty: 90 TABLET | Refills: 3 | Status: SHIPPED | OUTPATIENT
Start: 2024-10-14 | End: 2025-01-12

## 2024-10-14 NOTE — ASSESSMENT & PLAN NOTE
Patient's (Body mass index is 41.87 kg/m².) indicates that they are morbidly/severely obese (BMI > 40 or > 35 with obesity - related health condition) with health conditions that include hypertension . Weight is unchanged. BMI  is above average; BMI management plan is completed. We discussed portion control and increasing exercise.

## 2024-10-14 NOTE — PROGRESS NOTES
"Chief Complaint  Annual Exam (Physical; Anxiety; Drug abuse; GERD; HTN)    Subjective        Beverley Tovar presents to [facility]  History of Present Illness  Patient is a 44 y.o. female here today for annual physical, preventive labs and also to discuss chronic medical problems including hypertension, anxiety and nicotine dependence. Patient denies adverse effects of medications, headache, dizziness, chest pain, palpitations, shortness of breath, cough, nausea, vomiting, abdominal pain, muscle aches, joint pain, and fatigue. Patient complains of gaining a lot of weight since her last visit.. Patient is here for monitoring of chronic issues due to need for monitoring of renal function, liver function, adverse effects, and side effects.        Objective   Vital Signs:  /78 (BP Location: Right arm, Patient Position: Sitting, Cuff Size: Large Adult)   Pulse 86   Temp 98.2 °F (36.8 °C) (Oral)   Resp 20   Ht 177.8 cm (70\")   Wt 132 kg (291 lb 12.8 oz)   BMI 41.87 kg/m²   Estimated body mass index is 41.87 kg/m² as calculated from the following:    Height as of this encounter: 177.8 cm (70\").    Weight as of this encounter: 132 kg (291 lb 12.8 oz).          Physical Exam  Vitals and nursing note reviewed.   Constitutional:       Appearance: Normal appearance. She is obese.   HENT:      Head: Normocephalic and atraumatic.      Right Ear: Tympanic membrane and ear canal normal.      Left Ear: Tympanic membrane and ear canal normal.      Nose: Nose normal.      Mouth/Throat:      Mouth: Mucous membranes are moist.   Eyes:      Extraocular Movements: Extraocular movements intact.      Pupils: Pupils are equal, round, and reactive to light.   Cardiovascular:      Rate and Rhythm: Normal rate and regular rhythm. No extrasystoles are present.     Heart sounds: Normal heart sounds. No murmur heard.  Pulmonary:      Effort: Pulmonary effort is normal.      Breath sounds: Normal " breath sounds. No decreased breath sounds, wheezing or rhonchi.   Abdominal:      General: Abdomen is protuberant.      Palpations: Abdomen is soft. There is no mass.      Tenderness: There is no abdominal tenderness. There is no right CVA tenderness, left CVA tenderness, guarding or rebound.   Musculoskeletal:         General: Normal range of motion.      Cervical back: Normal range of motion and neck supple.      Right lower leg: No edema.      Left lower leg: No edema.   Lymphadenopathy:      Head:      Right side of head: No submental, submandibular, preauricular, posterior auricular or occipital adenopathy.      Left side of head: No submental, submandibular, preauricular, posterior auricular or occipital adenopathy.      Cervical: No cervical adenopathy.      Upper Body:      Right upper body: No supraclavicular or axillary adenopathy.      Left upper body: No supraclavicular or axillary adenopathy.   Skin:     Findings: No rash.   Neurological:      General: No focal deficit present.      Mental Status: She is alert and oriented to person, place, and time.      Cranial Nerves: Cranial nerves 2-12 are intact.      Sensory: Sensation is intact.      Motor: Motor function is intact.      Coordination: Coordination is intact.      Gait: Gait is intact.      Deep Tendon Reflexes: Reflexes are normal and symmetric.   Psychiatric:         Attention and Perception: Attention and perception normal.         Mood and Affect: Mood normal.         Speech: Speech normal.         Behavior: Behavior normal. Behavior is cooperative.         Thought Content: Thought content normal.         Cognition and Memory: Cognition normal.         Judgment: Judgment normal.        Result Review :                Assessment and Plan   Diagnoses and all orders for this visit:    1. Routine general medical examination at a health care facility (Primary)  -     CBC & Differential  -     Comprehensive Metabolic Panel  -     Lipid Panel  -      TSH  -     POC Urinalysis Dipstick  -     Hepatitis C Antibody  -     POC Glycosylated Hemoglobin (Hb A1C)    2. Primary hypertension  Assessment & Plan:  Hypertension is stable and controlled  Continue current treatment regimen.  Dietary sodium restriction.  Weight loss.  Blood pressure will be reassessed in 6 months.    Orders:  -     metoprolol succinate XL (TOPROL-XL) 25 MG 24 hr tablet; Take 1 tablet by mouth Daily for 90 days.  Dispense: 90 tablet; Refill: 3    3. Anxiety  Assessment & Plan:  Stable on Escitalopram.    Orders:  -     escitalopram (Lexapro) 20 MG tablet; Take 1 tablet by mouth Daily for 90 days.  Dispense: 90 tablet; Refill: 3    4. Class 3 severe obesity with serious comorbidity and body mass index (BMI) of 40.0 to 44.9 in adult, unspecified obesity type  Assessment & Plan:  Patient's (Body mass index is 41.87 kg/m².) indicates that they are morbidly/severely obese (BMI > 40 or > 35 with obesity - related health condition) with health conditions that include hypertension . Weight is unchanged. BMI  is above average; BMI management plan is completed. We discussed portion control and increasing exercise.       5. Cigarette nicotine dependence without complication  Assessment & Plan:  Tobacco use is unchanged.  Smoking cessation counseling was provided.  Tobacco use will be reassessed in 6 months.                                            6. Opioid dependence on agonist therapy  Assessment & Plan:  Stable on Suboxone. Follows with substance abuse clinic.      7. Nausea  -     promethazine (PHENERGAN) 25 MG tablet; Take 1 tablet by mouth Every 8 (Eight) Hours As Needed for Nausea or Vomiting for up to 14 days.  Dispense: 30 tablet; Refill: 3           I spent 30 minutes caring for Beverley on this date of service. This time includes time spent by me in the following activities:preparing for the visit, performing a medically appropriate examination and/or evaluation , counseling and educating the  patient/family/caregiver, ordering medications, tests, or procedures, and documenting information in the medical record  Follow Up   Return in about 6 months (around 4/14/2025), or if symptoms worsen or fail to improve.  Patient was given instructions and counseling regarding her condition or for health maintenance advice. Please see specific information pulled into the AVS if appropriate.     The patient is advised to continue all of her regular medications as prescribed. She was counseled regarding the importance of diet, exercise and medication compliance.    The preventive exam has been reviewed in detail.  The patient has been fully counseled on preventative guidelines for vaccines, cancer screenings, and other health maintenance needs.   The patient has been counseled on guidelines for maintaining a lifestyle to promote good health and to minimize chronic diseases.  The patient has been assisted with scheduling these healthcare procedures for the coming year and given a written document of health maintenance and anticipatory guidance for age with the AVS.      This document has been electronically signed by Chris Bhatia MD  November 12, 2024 11:19 EST

## 2024-12-17 ENCOUNTER — OFFICE VISIT (OUTPATIENT)
Dept: CARDIOLOGY | Facility: CLINIC | Age: 44
End: 2024-12-17
Payer: COMMERCIAL

## 2024-12-17 VITALS
WEIGHT: 293 LBS | BODY MASS INDEX: 41.95 KG/M2 | HEIGHT: 70 IN | HEART RATE: 78 BPM | SYSTOLIC BLOOD PRESSURE: 120 MMHG | DIASTOLIC BLOOD PRESSURE: 70 MMHG

## 2024-12-17 DIAGNOSIS — R40.0 DAYTIME SLEEPINESS: ICD-10-CM

## 2024-12-17 DIAGNOSIS — E66.01 CLASS 3 SEVERE OBESITY DUE TO EXCESS CALORIES WITHOUT SERIOUS COMORBIDITY WITH BODY MASS INDEX (BMI) OF 40.0 TO 44.9 IN ADULT: ICD-10-CM

## 2024-12-17 DIAGNOSIS — E66.813 CLASS 3 SEVERE OBESITY DUE TO EXCESS CALORIES WITHOUT SERIOUS COMORBIDITY WITH BODY MASS INDEX (BMI) OF 40.0 TO 44.9 IN ADULT: ICD-10-CM

## 2024-12-17 DIAGNOSIS — R53.83 OTHER FATIGUE: ICD-10-CM

## 2024-12-17 DIAGNOSIS — B18.2 HEPATITIS C CARRIER: ICD-10-CM

## 2024-12-17 DIAGNOSIS — I10 PRIMARY HYPERTENSION: Primary | ICD-10-CM

## 2024-12-17 PROCEDURE — 99214 OFFICE O/P EST MOD 30 MIN: CPT | Performed by: NURSE PRACTITIONER

## 2024-12-17 NOTE — PROGRESS NOTES
Chief Complaint   Patient presents with    Follow-up     Cardiac management    Dizziness     Patient reports she has more episodes of dizziness and weakness. She usually has to sit or lay down . These episodes some time will last about 30  minutes    LABS     2024 results in chart       Subjective       Beverley Tovar is a 44 y.o. female initially seen 2023 for cardiac evaluation of CP.  History includes IV drug use and hep C.  Repeat labs revealed chronic hep C.  Echocardiogram showed normal LV size and function with a EF around 65% and no significant valve issues.  RVSP was 29 mmHg. Abdominal ultrasound showed cholelithiasis, common bile duct dilation, with recommendation for HIDA scan and possible surgical consult.  Exercise treadmill stress test was negative for arrhythmia or ischemia.  Toprol-XL was added for heart rate and BP management.     Today she returns to the office for a follow-up visit.  She admits to episodes of dizziness and weakness.  The symptoms resolved with rest.  She admits to waking up at night and having daytime sleepiness concerning for sleep apnea.  Since last visit she underwent removal of gallbladder and has not had N/V and appetite improved.  She attributes this to weight gain.  Unfortunately she continues to smoke about half ppd.    Cardiac History:    Past Surgical History:   Procedure Laterality Date    ANKLE OPEN REDUCTION INTERNAL FIXATION Right 2018    Procedure: RIGHT ANKLE OPEN REDUCTION INTERNAL FIXATION;  Surgeon: Hira Jackson MD;  Location: [facility];  Service:     CARDIOVASCULAR STRESS TEST  2023    R.Stress. 6.07 Min. 7.3 METS. 91% THR. 143/75. Negative     SECTION      CHOLECYSTECTOMY  6/10/2024    ECHO - CONVERTED  2023    TLS. EF 65%. Trace MR. RVSP- 29 mmHg    EYE SURGERY      FRACTURE SURGERY      TUBAL ABDOMINAL LIGATION         Current Outpatient Medications   Medication Sig Dispense Refill     buprenorphine-naloxone (SUBOXONE) 8-2 MG per SL tablet Place 1.5 tablets under the tongue Daily.      escitalopram (Lexapro) 20 MG tablet Take 1 tablet by mouth Daily for 90 days. 90 tablet 3    metoprolol succinate XL (TOPROL-XL) 25 MG 24 hr tablet Take 1 tablet by mouth Daily for 90 days. 90 tablet 3    Multiple Vitamins-Iron (MULTIVITAMIN/IRON PO) Take 1 tablet by mouth Daily.       No current facility-administered medications for this visit.       Patient has no known allergies.    Past Medical History:   Diagnosis Date    Anxiety     Cholelithiasis     Depression     Hepatitis C     History of alcohol abuse     History of drug abuse     Hypertension     Liver disease     Low back pain     Obesity        Social History     Socioeconomic History    Marital status: Single   Tobacco Use    Smoking status: Every Day     Current packs/day: 0.50     Average packs/day: 0.5 packs/day for 25.0 years (12.5 ttl pk-yrs)     Types: Cigarettes    Smokeless tobacco: Never   Vaping Use    Vaping status: Former    Start date: 2022    Quit date: 2024    Substances: Nicotine, Flavoring   Substance and Sexual Activity    Alcohol use: Not Currently    Drug use: Not Currently     Types: Hydrocodone, Marijuana, Oxycodone     Comment: last used     Sexual activity: Yes     Partners: Male     Birth control/protection: Tubal ligation       Family History   Problem Relation Age of Onset    Depression Mother     Anxiety disorder Mother     Diabetes Mother         Both parents    Stroke Mother          at 56    Hyperlipidemia Father     Hypertension Father     Diabetes Father     COPD Father     Heart disease Father         CABG    Heart attack Father         MI in his 50's    Depression Sister     Depression Sister     Depression Sister     No Known Problems Daughter     No Known Problems Daughter        Review of Systems   Constitutional: Positive for malaise/fatigue.   Cardiovascular:  Negative for chest pain, leg  "swelling, near-syncope and palpitations.   Respiratory:  Positive for shortness of breath and sleep disturbances due to breathing.    Hematologic/Lymphatic: Negative for bleeding problem.   Neurological:  Positive for excessive daytime sleepiness, dizziness and weakness.        BP Readings from Last 5 Encounters:   12/17/24 120/70   10/14/24 112/78   07/23/24 122/70   05/13/24 126/74   04/09/24 118/68       Wt Readings from Last 5 Encounters:   12/17/24 (!) 137 kg (302 lb 9.6 oz)   10/14/24 132 kg (291 lb 12.8 oz)   07/23/24 123 kg (271 lb 12.8 oz)   05/13/24 119 kg (261 lb 9.6 oz)   04/09/24 118 kg (259 lb 3.2 oz)       Objective     Labs 10/14/2024: , triglycerides 104, HDL 46, , glucose 105, BUN 12, creatinine 0.58, sodium 140, potassium 4.2, ALT 80, AST 49, ALP 69, .6, TSH 2.71, A1c 5.2, hepatitis C antibody reactive,    /70 (BP Location: Left arm, Patient Position: Sitting, Cuff Size: Adult)   Pulse 78   Ht 177.8 cm (70\")   Wt (!) 137 kg (302 lb 9.6 oz)   BMI 43.42 kg/m²     Vitals and nursing note reviewed.   Constitutional:       Appearance: Well-groomed and not in distress.   HENT:      Head: Normocephalic.   Neck:      Vascular: No carotid bruit.   Pulmonary:      Effort: Pulmonary effort is normal.      Breath sounds: Normal breath sounds.   Cardiovascular:      PMI at left midclavicular line. Normal rate. Regular rhythm.   Edema:     Peripheral edema absent.   Abdominal:      General: Bowel sounds are normal.      Palpations: Abdomen is soft.   Musculoskeletal:      Cervical back: Neck supple. Skin:     General: Skin is warm and dry.   Neurological:      Mental Status: Alert, oriented to person, place, and time and oriented to person, place and time.   Psychiatric:         Behavior: Behavior is cooperative.          Procedures:none today          Assessment & Plan   Diagnoses and all orders for this visit:    1. Primary hypertension (Primary)  -     Overnight Sleep Oximetry " Study; Future    2. Other fatigue  -     Overnight Sleep Oximetry Study; Future    3. Daytime sleepiness  -     Overnight Sleep Oximetry Study; Future    4. Class 3 severe obesity due to excess calories without serious comorbidity with body mass index (BMI) of 40.0 to 44.9 in adult    5. Hepatitis C carrier        HTN  -Blood pressure controlled, heart rate and rhythm normal  -Palpitations denied  -Continue Toprol XL 25 mg daily    Fatigue/daytime sleepiness  -Overnight oxygen monitor ordered to look for nocturnal desaturation contributing to symptoms.  If positive then will refer for sleep study.    Obesity  -BMI 43, 37 in May of this year  -Discussed importance of decrease caloric intake and routine exercise  -If diet and exercise fail then consider GLP-1    Smoking  -Cessation encouraged    Hep C  -Currently under care of GI with follow-up appointment in near future.     If overnight oxygen study and GI workup is negative and symptoms persist then consider cardiac CTA versus nuclear stress test.     6-month follow-up visit scheduled.                 Electronically signed by DANDRE Rubio,  December 17, 2024 11:08 EST    Dictated Utilizing Dragon Dictation: Part of this note may be an electronic transcription/translation of spoken language to printed text using the Dragon Dictation System.

## 2025-03-06 ENCOUNTER — OFFICE VISIT (OUTPATIENT)
Dept: GASTROENTEROLOGY | Facility: CLINIC | Age: 45
End: 2025-03-06
Payer: COMMERCIAL

## 2025-03-06 ENCOUNTER — LAB (OUTPATIENT)
Dept: LAB | Facility: HOSPITAL | Age: 45
End: 2025-03-06
Payer: COMMERCIAL

## 2025-03-06 VITALS
BODY MASS INDEX: 41.95 KG/M2 | HEIGHT: 70 IN | SYSTOLIC BLOOD PRESSURE: 126 MMHG | HEART RATE: 83 BPM | WEIGHT: 293 LBS | OXYGEN SATURATION: 98 % | TEMPERATURE: 98 F | DIASTOLIC BLOOD PRESSURE: 82 MMHG

## 2025-03-06 DIAGNOSIS — B18.2 CHRONIC HEPATITIS C WITHOUT HEPATIC COMA: Primary | ICD-10-CM

## 2025-03-06 DIAGNOSIS — B18.2 CHRONIC HEPATITIS C WITHOUT HEPATIC COMA: ICD-10-CM

## 2025-03-06 DIAGNOSIS — Z72.0 TOBACCO ABUSE: ICD-10-CM

## 2025-03-06 LAB
ALBUMIN SERPL-MCNC: 3.5 G/DL (ref 3.5–5.2)
ALBUMIN/GLOB SERPL: 1 G/DL
ALP SERPL-CCNC: 85 U/L (ref 39–117)
ALT SERPL W P-5'-P-CCNC: 87 U/L (ref 1–33)
ANION GAP SERPL CALCULATED.3IONS-SCNC: 7.6 MMOL/L (ref 5–15)
AST SERPL-CCNC: 54 U/L (ref 1–32)
BASOPHILS # BLD AUTO: 0.06 10*3/MM3 (ref 0–0.2)
BASOPHILS NFR BLD AUTO: 0.6 % (ref 0–1.5)
BILIRUB SERPL-MCNC: 0.4 MG/DL (ref 0–1.2)
BUN SERPL-MCNC: 10 MG/DL (ref 6–20)
BUN/CREAT SERPL: 14.3 (ref 7–25)
CALCIUM SPEC-SCNC: 9.2 MG/DL (ref 8.6–10.5)
CHLORIDE SERPL-SCNC: 104 MMOL/L (ref 98–107)
CO2 SERPL-SCNC: 25.4 MMOL/L (ref 22–29)
CREAT SERPL-MCNC: 0.7 MG/DL (ref 0.57–1)
DEPRECATED RDW RBC AUTO: 40.6 FL (ref 37–54)
EGFRCR SERPLBLD CKD-EPI 2021: 109.5 ML/MIN/1.73
EOSINOPHIL # BLD AUTO: 0.13 10*3/MM3 (ref 0–0.4)
EOSINOPHIL NFR BLD AUTO: 1.4 % (ref 0.3–6.2)
ERYTHROCYTE [DISTWIDTH] IN BLOOD BY AUTOMATED COUNT: 11.8 % (ref 12.3–15.4)
GLOBULIN UR ELPH-MCNC: 3.5 GM/DL
GLUCOSE SERPL-MCNC: 91 MG/DL (ref 65–99)
HBV SURFACE AB SER RIA-ACNC: NORMAL
HBV SURFACE AG SERPL QL IA: NORMAL
HCT VFR BLD AUTO: 44.7 % (ref 34–46.6)
HGB BLD-MCNC: 15 G/DL (ref 12–15.9)
HIV 1+2 AB+HIV1 P24 AG SERPL QL IA: NORMAL
IMM GRANULOCYTES # BLD AUTO: 0.03 10*3/MM3 (ref 0–0.05)
IMM GRANULOCYTES NFR BLD AUTO: 0.3 % (ref 0–0.5)
IRON 24H UR-MRATE: 69 MCG/DL (ref 37–145)
IRON SATN MFR SERPL: 19 % (ref 20–50)
LYMPHOCYTES # BLD AUTO: 2.27 10*3/MM3 (ref 0.7–3.1)
LYMPHOCYTES NFR BLD AUTO: 24.2 % (ref 19.6–45.3)
MCH RBC QN AUTO: 31.3 PG (ref 26.6–33)
MCHC RBC AUTO-ENTMCNC: 33.6 G/DL (ref 31.5–35.7)
MCV RBC AUTO: 93.1 FL (ref 79–97)
MONOCYTES # BLD AUTO: 0.55 10*3/MM3 (ref 0.1–0.9)
MONOCYTES NFR BLD AUTO: 5.9 % (ref 5–12)
NEUTROPHILS NFR BLD AUTO: 6.35 10*3/MM3 (ref 1.7–7)
NEUTROPHILS NFR BLD AUTO: 67.6 % (ref 42.7–76)
NRBC BLD AUTO-RTO: 0 /100 WBC (ref 0–0.2)
PLATELET # BLD AUTO: 288 10*3/MM3 (ref 140–450)
PMV BLD AUTO: 9.7 FL (ref 6–12)
POTASSIUM SERPL-SCNC: 4.2 MMOL/L (ref 3.5–5.2)
PROT SERPL-MCNC: 7 G/DL (ref 6–8.5)
RBC # BLD AUTO: 4.8 10*6/MM3 (ref 3.77–5.28)
SODIUM SERPL-SCNC: 137 MMOL/L (ref 136–145)
TIBC SERPL-MCNC: 361 MCG/DL (ref 298–536)
TRANSFERRIN SERPL-MCNC: 242 MG/DL (ref 200–360)
WBC NRBC COR # BLD AUTO: 9.39 10*3/MM3 (ref 3.4–10.8)

## 2025-03-06 PROCEDURE — 84466 ASSAY OF TRANSFERRIN: CPT

## 2025-03-06 PROCEDURE — 86706 HEP B SURFACE ANTIBODY: CPT

## 2025-03-06 PROCEDURE — 86038 ANTINUCLEAR ANTIBODIES: CPT

## 2025-03-06 PROCEDURE — 86708 HEPATITIS A ANTIBODY: CPT

## 2025-03-06 PROCEDURE — 80053 COMPREHEN METABOLIC PANEL: CPT

## 2025-03-06 PROCEDURE — 86364 TISS TRNSGLTMNASE EA IG CLAS: CPT

## 2025-03-06 PROCEDURE — 87902 NFCT AGT GNTYP ALYS HEP C: CPT

## 2025-03-06 PROCEDURE — 86231 EMA EACH IG CLASS: CPT

## 2025-03-06 PROCEDURE — 83540 ASSAY OF IRON: CPT

## 2025-03-06 PROCEDURE — 82784 ASSAY IGA/IGD/IGG/IGM EACH: CPT

## 2025-03-06 PROCEDURE — 85025 COMPLETE CBC W/AUTO DIFF WBC: CPT

## 2025-03-06 PROCEDURE — 87340 HEPATITIS B SURFACE AG IA: CPT

## 2025-03-06 PROCEDURE — 87522 HEPATITIS C REVRS TRNSCRPJ: CPT

## 2025-03-06 PROCEDURE — G0432 EIA HIV-1/HIV-2 SCREEN: HCPCS

## 2025-03-06 RX ORDER — BENZONATATE 100 MG/1
1 CAPSULE, LIQUID FILLED ORAL DAILY
COMMUNITY
Start: 2025-02-13

## 2025-03-06 NOTE — PROGRESS NOTES
New Patient Consultation     Patient Name: Beverley Tovar  : 1980   MRN: [id number]     Chief Complaint:    Chief Complaint   Patient presents with    Hepatitis C       History of Present Illness: Beverley Tovar is a 44 y.o. female, PMH includes anxiety, depression, h/o IVDU on suboxone, HTN, who is here today for a Gastroenterology Consultation for chronic Hep C.     Pt reports diagnosis of HCV in 2018. She completed one month of Epclusa around that time but was unable to obtain further refills for such and did not complete full course of treatment. Patient denies associated fever, chills, abdominal pain, indigestion, nausea, vomiting, diarrhea, constipation, hematemesis, dysphagia, hematochezia, melena, bloating, weight loss or gain, dysuria, jaundice or bruising.    Patient denies personal or FHx of PUD, H Pylori, gastritis, pancreatitis, colitis, Celiac disease, UC, Crohn's disease, IBS, colon or gastric cancers. Pt denies EtOH use. S/p C section, CCY, tubal ligation. Smokes 0.5ppd cigarettes. Occasional marijuana, NSAID use.     No previous EGD / CSY.     Subjective      Review of Systems:   Review of Systems   Constitutional:  Negative for appetite change, chills, diaphoresis, fatigue, fever, unexpected weight gain and unexpected weight loss.   HENT:  Negative for drooling, facial swelling, mouth sores, rhinorrhea, sore throat, tinnitus, trouble swallowing and voice change.    Eyes: Negative.    Respiratory:  Negative for cough, chest tightness and shortness of breath.    Cardiovascular:  Negative for chest pain.   Gastrointestinal:  Negative for abdominal pain, blood in stool, constipation, diarrhea, nausea, vomiting, GERD and indigestion.   Genitourinary:  Negative for dysuria, flank pain, hematuria and pelvic pain.   Musculoskeletal:  Negative for arthralgias and myalgias.   Skin:  Negative for color change, pallor and rash.   Neurological:  Negative for dizziness, tremors, syncope,  weakness and numbness.   Psychiatric/Behavioral:  Negative for hallucinations and sleep disturbance. The patient is not nervous/anxious.    All other systems reviewed and are negative.      Past Medical History:   Past Medical History:   Diagnosis Date    Anxiety     Cholelithiasis     Depression     Hepatitis C     History of alcohol abuse     History of drug abuse     Hypertension     Liver disease     Low back pain     Obesity        Past Surgical History:   Past Surgical History:   Procedure Laterality Date    ANKLE OPEN REDUCTION INTERNAL FIXATION Right 2018    Procedure: RIGHT ANKLE OPEN REDUCTION INTERNAL FIXATION;  Surgeon: Hira Jackson MD;  Location: [facility];  Service:     CARDIOVASCULAR STRESS TEST  2023    R.Stress. 6.07 Min. 7.3 METS. 91% THR. 143/75. Negative     SECTION      CHOLECYSTECTOMY  6/10/2024    ECHO - CONVERTED  2023    TLS. EF 65%. Trace MRAlexsandra RVSP- 29 mmHg    EYE SURGERY      FRACTURE SURGERY      TUBAL ABDOMINAL LIGATION         Family History:   Family History   Problem Relation Age of Onset    Depression Mother     Anxiety disorder Mother     Diabetes Mother         Both parents    Stroke Mother          at 56    Hyperlipidemia Father     Hypertension Father     Diabetes Father     COPD Father     Heart disease Father         CABG    Heart attack Father         MI in his 50's    Depression Sister     Depression Sister     Depression Sister     No Known Problems Daughter     No Known Problems Daughter        Social History:   Social History     Socioeconomic History    Marital status: Single   Tobacco Use    Smoking status: Every Day     Current packs/day: 0.50     Average packs/day: 0.5 packs/day for 25.0 years (12.5 ttl pk-yrs)     Types: Cigarettes    Smokeless tobacco: Never   Vaping Use    Vaping status: Former    Start date: 2022    Quit date: 2024    Substances: Nicotine, Flavoring   Substance and Sexual Activity    Alcohol use: Not  "Currently    Drug use: Not Currently     Types: Hydrocodone, Marijuana, Oxycodone     Comment: last used 2018    Sexual activity: Yes     Partners: Male     Birth control/protection: Tubal ligation       Alcohol/Tobacco History:   Social History     Substance and Sexual Activity   Alcohol Use Not Currently     Social History     Tobacco Use   Smoking Status Every Day    Current packs/day: 0.50    Average packs/day: 0.5 packs/day for 25.0 years (12.5 ttl pk-yrs)    Types: Cigarettes   Smokeless Tobacco Never       Medications:     Current Outpatient Medications:     buprenorphine-naloxone (SUBOXONE) 8-2 MG per SL tablet, Place 1.5 tablets under the tongue Daily., Disp: , Rfl:     Multiple Vitamins-Iron (MULTIVITAMIN/IRON PO), Take 1 tablet by mouth Daily., Disp: , Rfl:     Tab-A-Christopher/Iron/Beta Carotene tablet tablet, Take 1 tablet by mouth Daily., Disp: , Rfl:     escitalopram (Lexapro) 20 MG tablet, Take 1 tablet by mouth Daily for 90 days., Disp: 90 tablet, Rfl: 3    metoprolol succinate XL (TOPROL-XL) 25 MG 24 hr tablet, Take 1 tablet by mouth Daily for 90 days., Disp: 90 tablet, Rfl: 3    Allergies:   No Known Allergies    Objective     Physical Exam:  Vital Signs:   Vitals:    03/06/25 1127   BP: 126/82   Pulse: 83   Temp: 98 °F (36.7 °C)   SpO2: 98%   Weight: (!) 137 kg (302 lb)   Height: 177.8 cm (70\")   PainSc: 0-No pain     Body mass index is 43.33 kg/m².     Physical Exam  Vitals and nursing note reviewed.   Constitutional:       Appearance: Normal appearance. She is normal weight. She is not ill-appearing or diaphoretic.      Comments: BMI 43.33   HENT:      Head: Normocephalic and atraumatic.      Right Ear: External ear normal.      Left Ear: External ear normal.      Nose: Nose normal.      Mouth/Throat:      Mouth: Mucous membranes are moist.      Pharynx: Oropharynx is clear.   Eyes:      Conjunctiva/sclera: Conjunctivae normal.      Pupils: Pupils are equal, round, and reactive to light.   Neck:      " Thyroid: No thyromegaly.   Cardiovascular:      Rate and Rhythm: Normal rate and regular rhythm.      Pulses: Normal pulses.      Heart sounds: Normal heart sounds.   Pulmonary:      Effort: Pulmonary effort is normal.      Breath sounds: Normal breath sounds.   Abdominal:      General: Abdomen is flat. Bowel sounds are normal. There is no distension.      Tenderness: There is no abdominal tenderness.   Musculoskeletal:         General: Normal range of motion.      Cervical back: Normal range of motion and neck supple.   Skin:     General: Skin is warm and dry.   Neurological:      General: No focal deficit present.      Mental Status: She is oriented to person, place, and time.   Psychiatric:         Mood and Affect: Mood normal.         Assessment / Plan      Assessment/Plan:   There are no diagnoses linked to this encounter.     Chronic Hep C virus  Tobacco abuse   - will initiate Epclusa therapy, pending workup below   - obtain CBC, CMP, EH, HIV 1/2, iron profile, Hep C genotype and quant, HCV Fibrosure, Hep A/B serologies, celiac panel    - obtain liver US   - follow up in clinic in 3mo, or after completion of above studies   - call clinic at any time for questions or new / worsened sx    I advised the patient of the risks in continuing to use tobacco, and I provided this patient with smoking cessation educational materials.  I also discussed how to quit smoking and the patient has expressed the willingness to quit.      During this visit, I spent less than 3 minutes counseling the patient regarding smoking cessation.     Follow Up:   Return in about 3 months (around 6/6/2025).    Plan of care reviewed with the patient at the conclusion of today's visit.  Education was provided regarding diagnosis, management, and any prescribed or recommended OTC medications.  Patient verbalized understanding of and agreement with management plan.     NOTE TO PATIENT: The 21st Century Cures Act makes medical notes like these  available to patients in the interest of transparency. However, be advised this is a medical document. It is intended as peer to peer communication. It is written in medical language and may contain abbreviations or verbiage that are unfamiliar. It may appear blunt or direct. Medical documents are intended to carry relevant information, facts as evident, and the clinical opinion of the practitioner.     Time Statement:   Discussed plan of care in detail with patient today. Patient verbally understands and agrees. I have spent 45 minutes reviewing available diagnostics, obtaining history, examining the patient, developing a treatment plan, and educating the patient on disease process and plan of care.    Khloe Rain PA-C   [facility]

## 2025-03-07 LAB
A2 MACROGLOB SERPL-MCNC: 172 MG/DL (ref 110–276)
ALT SERPL W P-5'-P-CCNC: 96 IU/L (ref 0–40)
ANA SER QL: NEGATIVE
APO A-I SERPL-MCNC: 138 MG/DL (ref 116–209)
BILIRUB SERPL-MCNC: 0.3 MG/DL (ref 0–1.2)
ENDOMYSIUM IGA SER QL: NEGATIVE
FIBROSIS SCORING:: ABNORMAL
FIBROSIS STAGE SERPL QL: ABNORMAL
GGT SERPL-CCNC: 83 IU/L (ref 0–60)
HAPTOGLOB SERPL-MCNC: 120 MG/DL (ref 42–296)
HAV AB SER QL IA: NEGATIVE
HCV AB SER QL: ABNORMAL
IGA SERPL-MCNC: 344 MG/DL (ref 87–352)
LABORATORY COMMENT REPORT: ABNORMAL
LIVER FIBR SCORE SERPL CALC.FIBROSURE: 0.14 (ref 0–0.21)
NECROINFLAMM ACTIVITY SCORING:: ABNORMAL
NECROINFLAMMATORY ACT GRADE SERPL QL: ABNORMAL
NECROINFLAMMATORY ACT SCORE SERPL: 0.49 (ref 0–0.17)
SERVICE CMNT-IMP: ABNORMAL
TEST PERFORMANCE INFO SPEC: ABNORMAL
TTG IGA SER-ACNC: <2 U/ML (ref 0–3)

## 2025-03-08 LAB — HCV GENTYP SERPL NAA+PROBE: NORMAL

## 2025-03-10 ENCOUNTER — PATIENT MESSAGE (OUTPATIENT)
Dept: GASTROENTEROLOGY | Facility: CLINIC | Age: 45
End: 2025-03-10
Payer: COMMERCIAL

## 2025-03-10 DIAGNOSIS — B18.2 CHRONIC HEPATITIS C WITHOUT HEPATIC COMA: Primary | ICD-10-CM

## 2025-03-10 LAB
HCV RNA SERPL NAA+PROBE-ACNC: NORMAL IU/ML
HCV RNA SERPL NAA+PROBE-LOG IU: 4.89 LOG10 IU/ML
REF LAB TEST REF RANGE: NORMAL

## 2025-03-10 RX ORDER — VELPATASVIR AND SOFOSBUVIR 100; 400 MG/1; MG/1
1 TABLET, FILM COATED ORAL DAILY
Qty: 28 TABLET | Refills: 2 | Status: SHIPPED | OUTPATIENT
Start: 2025-03-10

## 2025-03-11 ENCOUNTER — PATIENT ROUNDING (BHMG ONLY) (OUTPATIENT)
Dept: GASTROENTEROLOGY | Facility: CLINIC | Age: 45
End: 2025-03-11
Payer: COMMERCIAL

## 2025-03-11 ENCOUNTER — SPECIALTY PHARMACY (OUTPATIENT)
Dept: PHARMACY | Facility: TELEHEALTH | Age: 45
End: 2025-03-11
Payer: COMMERCIAL

## 2025-03-11 NOTE — PROGRESS NOTES
[employer] MESSAGE HAS BEEN SENT TO THE PATIENT FOR PATIENT ROUNDING WITH [facility]

## 2025-03-14 ENCOUNTER — SPECIALTY PHARMACY (OUTPATIENT)
Dept: PHARMACY | Facility: TELEHEALTH | Age: 45
End: 2025-03-14
Payer: COMMERCIAL

## 2025-03-14 NOTE — PROGRESS NOTES
Specialty Pharmacy Patient Management Program  Initial Assessment     Beverley Tovar is a 44 y.o. female with hep c infection and enrolled in the Patient Management program offered by [facility]. An initial outreach was conducted, including assessment of therapy appropriateness and specialty medication education for epclusa. The patient was introduced to services offered by [facility], including: regular assessments, refill coordination, curbside pick-up or mail order delivery options, prior authorization maintenance, and financial assistance programs as applicable. The patient was also provided with contact information for the pharmacy team.     Insurance Coverage & Financial Support  Covered under [employer] by prior auth for full treatment      Relevant Past Medical History and Comorbidities  Relevant medical history and concomitant health conditions were discussed with the patient. The patient's chart has been reviewed for relevant past medical history and comorbid health conditions and updated as necessary.   Past Medical History:   Diagnosis Date    Anxiety     Cholelithiasis     Depression     Hepatitis C     History of alcohol abuse     History of drug abuse     Hypertension     Liver disease     Low back pain     Obesity      Social History     Socioeconomic History    Marital status: Single   Tobacco Use    Smoking status: Every Day     Current packs/day: 0.50     Average packs/day: 0.5 packs/day for 25.0 years (12.5 ttl pk-yrs)     Types: Cigarettes    Smokeless tobacco: Never   Vaping Use    Vaping status: Former    Start date: 6/14/2022    Quit date: 1/1/2024    Substances: Nicotine, Flavoring   Substance and Sexual Activity    Alcohol use: Not Currently    Drug use: Not Currently     Types: Hydrocodone, Marijuana, Oxycodone     Comment: last used 2018    Sexual activity: Yes     Partners: Male     Birth control/protection: Tubal ligation     Problem  list reviewed by Edgardo Zabala RPH on 3/14/2025 at 11:20 AM    Allergies  Known allergies and reactions were discussed with the patient. The patient's chart has been reviewed for allergy information and updated as necessary.   Patient has no known allergies.  Allergies reviewed by Edgardo Zabala RPH on 3/14/2025 at 11:20 AM    Current Medication List  This medication list has been reviewed with the patient and evaluated for any interactions or necessary modifications/recommendations, and updated to include all prescription medications, OTC medications, and supplements the patient is currently taking. This list reflects what is contained in the patient's profile, which has also been marked as reviewed to communicate to other providers it is the most up to date version of the patient's current medication therapy.     Current Outpatient Medications:     buprenorphine-naloxone (SUBOXONE) 8-2 MG per SL tablet, Place 1.5 tablets under the tongue Daily., Disp: , Rfl:     escitalopram (Lexapro) 20 MG tablet, Take 1 tablet by mouth Daily for 90 days., Disp: 90 tablet, Rfl: 3    metoprolol succinate XL (TOPROL-XL) 25 MG 24 hr tablet, Take 1 tablet by mouth Daily for 90 days., Disp: 90 tablet, Rfl: 3    Multiple Vitamins-Iron (MULTIVITAMIN/IRON PO), Take 1 tablet by mouth Daily., Disp: , Rfl:     Sofosbuvir-Velpatasvir (Epclusa) 400-100 MG tablet, Take 1 tablet by mouth Daily., Disp: 28 tablet, Rfl: 2    Tab-A-Christopher/Iron/Beta Carotene tablet tablet, Take 1 tablet by mouth Daily., Disp: , Rfl:   Medicines reviewed by Edgardo Zabala RPH on 3/14/2025 at 11:20 AM    Drug Interactions  none     Relevant Laboratory Values  Lab Results   Component Value Date    GLUCOSE 91 03/06/2025    CALCIUM 9.2 03/06/2025     03/06/2025    K 4.2 03/06/2025    CO2 25.4 03/06/2025     03/06/2025    BUN 10 03/06/2025    CREATININE 0.70 03/06/2025    BCR 14.3 03/06/2025    ANIONGAP 7.6 03/06/2025     Lab Results   Component Value  Date    WBC 9.39 03/06/2025    HGB 15.0 03/06/2025    HCT 44.7 03/06/2025    MCV 93.1 03/06/2025     03/06/2025     Lab Value Review  The above lab values have been reviewed; the following specialty medication(s) dose adjustment(s) are recommended: none.    Initial Education Provided for Specialty Medication  The patient has been provided with the following education and any applicable administration techniques (i.e. self-injection) have been demonstrated for the therapies indicated. All questions and concerns have been addressed prior to the patient receiving the medication, and the patient has verbalized understanding of the education and any materials provided. Additional patient education shall be provided and documented upon request by the patient, provider or payer.      Epclusa (sofosbuvir/velpatasvir)         Medication Expectations   Why am I taking this medication? This is indicated for patients with hepatitis C virus (HCV) genotypes 1-6 without cirrhosis, genotypes 1, 2, 4-6 with compensated cirrhosis, or genotype 3 with compensated cirrhosis if ROBERT Y93H is negative. It is also indicated for patients with genotypes 1-6 with decompensated cirrhosis, however this will require longer treatment and/or the addition of ribavirin.   What should I expect while on this medication? There is a > 90% cure rate of hepatitis C with completed treatment.   How does the medication work? Sofosbuvir is an inhibitor of HCV NS5B protease, necessary for replication of the virus.    Velpatasvir is an inhibitor of HCV NS5A, essential for viral replication and assembly.   How long will I be on this medication for? You will be on this medication for 12 weeks.   How do I take this medication? Sofosbuvir/velpatasvir is 1 tablet taken at the same time each day with or without food.   What are some possible side effects? The most common side effects are headache, fatigue, nausea, insomnia, and common cold symptoms.   What  happens if I miss a dose? If it has been less than 18 hours, take the missed dose as soon as you can. Take your next dose at the usual time.  If it has been more than 18 hours, do not take your missed dose, take your next dose as usual.            Medication Safety   What are things I should warn my doctor immediately about? Allergic reaction (itching or hives, swelling in your face or hands, swelling or tingling in your mouth or throat, chest tightness, or trouble breathing); signs of liver failure including dark urine, pale stools, nausea, vomiting, loss of appetite, stomach pain, yellow skin or eyes.   What are things that I should be cautious of? Headache, nausea, tiredness or weakness.   What are some medications that can interact with this one? Some medicines can affect how sofosbuvir/velpatasvir works. Tell your doctor if you are using any of the following:  Rifampin, rifabutin, rifapentin, carbamazepine, oxcarbazepine, phenytoin, phenobarbital, Valarie's wort, or amiodarone   Medicine to treat HIV infection (including tipranavir, efavirenz, or ritonavir)  If you are taking a PPI, your therapy may need to be temporarily discontinued. If PPI therapy is necessary, omeprazole 20 mg is preferred and should be taken at least 4 hours after sofosbuvir/velpatasvir.            Medication Storage/Handling   How should I handle this medication? Keep this medication out of reach of pets/children in original container.     How does this medication need to be stored? Store at room temperature.   How should I dispose of this medication? You should not have any medication left over. If you do reach out to your pharmacist of doctor.            Resources/Support   How can I remind myself to take this medication? You can download a reminder nita on your phone or use a calendar to help with your once daily reminder.   Is financial support available?  Yes, [employer] can provide co-pay cards if you have commercial insurance or patient  assistance if you have Medicare or no insurance.    Which vaccines are recommended for me? Talk with your doctor about the hepatitis B vaccine.             Adherence, Self-Administration, and Current Therapy Problems  Adherence related to the patient's specialty therapy was discussed with the patient. The Adherence segment of this outreach has been reviewed and updated.          Additional Barriers to Patient Self-Administration: none  Methods for Supporting Patient Self-Administration: none    Open Medication Therapy Problems  No medication therapy recommendations to display    Goals of Therapy   Goals Addressed Today        Specialty Pharmacy General Goal      Prevent fibrosis progression when patient misses <5 % of doses.  Successful sustained virologic response 12 weeks post-treatment (SVR12).    03/11/25:                Reassessment Plan & Follow-Up  Medication Therapy Changes: patient did take medication for 1 month previously and stopped due to not being able to get from pharmacy.  Patient has been advised that we will send the medication directly to her home every month for the full therapy.  Additional Plans, Therapy Recommendations, or Therapy Problems to Be Addressed: none   Pharmacist to perform regular reassessments no more than (6) months from the previous assessment.  Welcome information and patient satisfaction survey to be sent by retail team with patient's initial fill.  Care Coordinator to set up future refill outreaches, coordinate prescription delivery, and escalate clinical questions to pharmacist.     Attestation  I attest the patient was actively involved in and has agreed to the above plan of care. I attest that the initiated specialty medication(s) are appropriate for the patient based on my assessment. If the prescribed therapy is at any point deemed not appropriate based on the current or future assessments, a consultation will be initiated with the patient's specialty care provider to  determine the best course of action. The revised plan of therapy will be documented along with any reassessments and/or additional patient education provided.     Electronically signed by Edgardo Zabala RPH, 03/14/25, 11:20 AM EDT.

## 2025-06-03 ENCOUNTER — SPECIALTY PHARMACY (OUTPATIENT)
Dept: PHARMACY | Facility: TELEHEALTH | Age: 45
End: 2025-06-03
Payer: COMMERCIAL

## 2025-06-03 NOTE — PROGRESS NOTES
Specialty Pharmacy Patient Management Program  Reassessment     Beverley Tovar is a 45 y.o. female with hep c and enrolled in the Patient Management program offered by [facility]. A follow-up outreach was conducted, including assessment of continued therapy appropriateness, medication adherence, and side effect incidence and management for sofosbuvir-velpatasvir.     Changes to Insurance Coverage or Financial Support  none    Relevant Past Medical History and Comorbidities  Relevant medical history and concomitant health conditions were discussed with the patient. The patient's chart has been reviewed for relevant past medical history and comorbid health conditions and updated as necessary.   Past Medical History:   Diagnosis Date    Anxiety     Cholelithiasis     Depression     Hepatitis C     History of alcohol abuse     History of drug abuse     Hypertension     Liver disease     Low back pain     Obesity      Social History     Socioeconomic History    Marital status: Single   Tobacco Use    Smoking status: Every Day     Current packs/day: 0.50     Average packs/day: 0.5 packs/day for 25.0 years (12.5 ttl pk-yrs)     Types: Cigarettes    Smokeless tobacco: Never   Vaping Use    Vaping status: Former    Start date: 6/14/2022    Quit date: 1/1/2024    Substances: Nicotine, Flavoring   Substance and Sexual Activity    Alcohol use: Not Currently    Drug use: Not Currently     Types: Hydrocodone, Marijuana, Oxycodone     Comment: last used 2018    Sexual activity: Yes     Partners: Male     Birth control/protection: Tubal ligation     Problem list reviewed by Edgardo Zabala RPH on 6/3/2025 at  1:19 PM    Allergies  Known allergies and reactions were discussed with the patient. The patient's chart has been reviewed for allergy information and updated as necessary.   No Known Allergies  Allergies reviewed by Egdardo Zabala RPH on 6/3/2025 at  1:19 PM    Relevant Laboratory Values  Lab  Results   Component Value Date    GLUCOSE 91 03/06/2025    CALCIUM 9.2 03/06/2025     03/06/2025    K 4.2 03/06/2025    CO2 25.4 03/06/2025     03/06/2025    BUN 10 03/06/2025    CREATININE 0.70 03/06/2025    BCR 14.3 03/06/2025    ANIONGAP 7.6 03/06/2025     Lab Results   Component Value Date    WBC 9.39 03/06/2025    HGB 15.0 03/06/2025    HCT 44.7 03/06/2025    MCV 93.1 03/06/2025     03/06/2025     Lab Value Review  The above lab values have been reviewed; the following specialty medication(s) dose adjustment(s) are recommended: none.    Current Medication List  This medication list has been reviewed with the patient and evaluated for any interactions or necessary modifications/recommendations, and updated to include all prescription medications, OTC medications, and supplements the patient is currently taking. This list reflects what is contained in the patient's profile, which has also been marked as reviewed to communicate to other providers it is the most up to date version of the patient's current medication therapy.     Current Outpatient Medications:     buprenorphine-naloxone (SUBOXONE) 8-2 MG per SL tablet, Place 1.5 tablets under the tongue Daily., Disp: , Rfl:     escitalopram (Lexapro) 20 MG tablet, Take 1 tablet by mouth Daily for 90 days., Disp: 90 tablet, Rfl: 3    metoprolol succinate XL (TOPROL-XL) 25 MG 24 hr tablet, Take 1 tablet by mouth Daily for 90 days., Disp: 90 tablet, Rfl: 3    Multiple Vitamins-Iron (MULTIVITAMIN/IRON PO), Take 1 tablet by mouth Daily., Disp: , Rfl:     Sofosbuvir-Velpatasvir (Epclusa) 400-100 MG tablet, Take 1 tablet by mouth Daily., Disp: 28 tablet, Rfl: 2    Tab-A-Christopher/Iron/Beta Carotene tablet tablet, Take 1 tablet by mouth Daily., Disp: , Rfl:   Medicines reviewed by Edgardo Zabala RPH on 6/3/2025 at  1:19 PM    Drug Interactions  none     Adverse Drug Reactions  Medication tolerability: Tolerating with no to minimal ADRs  Medication plan:  Continue therapy with normal follow-up  Plan for ADR Management: none    Hospitalizations and Urgent Care Since Last Assessment  Hospitalizations or Admissions: none  ED Visits: none  Urgent Office Visits: none     Adherence, Self-Administration, and Current Therapy Problems  Adherence related to the patient's specialty therapy was discussed with the patient. The Adherence segment of this outreach has been reviewed and updated.     Adherence Questions  Linked Medication(s) Assessed: Sofosbuvir-Velpatasvir  On average, how many doses/injections does the patient miss per month?: 0 (patient was on a delay start. She confirmed she has about 1 week of medicine left with no missed doses)  What are the identified reasons for non-adherence or missed doses? : no problems identified  What is the estimated medication adherence level?: %  Based on the patient/caregiver response and refill history, does this patient require an MTP to track adherence improvements?: no    Additional Barriers to Patient Self-Administration: none  Methods for Supporting Patient Self-Administration: none    Open Medication Therapy Problems  No medication therapy recommendations to display    Goals of Therapy  Goals related to the patient's specialty therapy were discussed with the patient. The Patient Goals segment of this outreach has been reviewed and updated.   Goals Addressed Today        Specialty Pharmacy General Goal      Prevent fibrosis progression when patient misses <5 % of doses.  Successful sustained virologic response 12 weeks post-treatment (SVR12).    03/11/25:                Progress Toward Meeting Patient-Identified Goals of Therapy: On Track  New Patient-Identified Goals, If Applicable:     Progress Toward Meeting Clinical Goals or Therapeutic Targets: On Track  New Clinical Goals or Therapeutic Targets, If Applicable:     Quality of Life Assessment   Quality of Life related to the patient's enrollment in the patient  management program and services provided was discussed with the patient. The QOL segment of this outreach has been reviewed and updated.  Quality of Life Improvement Scale: 9-A good deal better    Reassessment Plan & Follow-Up  Medication Therapy Changes: second month of therapy filled, patient started first month later than from when we first sent out  Additional Plans, Therapy Recommendations, or Therapy Problems to Be Addressed: none   Pharmacist to perform regular reassessments no more than (6) months from the previous assessment.  Care Coordinator to set up future refill outreaches, coordinate prescription delivery, and escalate clinical questions to pharmacist.     Attestation  I attest the patient was actively involved in and has agreed to the above plan of care. I attest that the specialty medication(s) addressed above are appropriate for the patient based on my reassessment. If the prescribed therapy is at any point deemed not appropriate based on the current or future assessments, a consultation will be initiated with the patient's specialty care provider to determine the best course of action. The revised plan of therapy will be documented along with any required assessments and/or additional patient education provided.     Electronically signed by Edgardo Zabala RPH, 06/03/25, 1:20 PM EDT.

## 2025-06-03 NOTE — PROGRESS NOTES
Specialty Pharmacy Patient Management Program  Call Center Refill Outreach      Beverley is a 45 y.o. female contacted today regarding refills of her medication(s).    Specialty medication(s) and dose(s) confirmed: sofosbuvir-velpatasvir 400-100mg  Other medications being refilled: none    Refill Questions      Flowsheet Row Most Recent Value   Changes to allergies? No   Changes to medications? No   New conditions or infections since last clinic visit No   Unplanned office visit, urgent care, ED, or hospital admission in the last 4 weeks  No   How does patient/caregiver feel medication is working? Very good   Financial problems or insurance changes  No   Since the previous refill, were any specialty medication doses or scheduled injections missed or delayed?  No   Does this patient require a clinical escalation to a pharmacist? No            Delivery Questions      Flowsheet Row Most Recent Value   Delivery method UPS   Delivery address verified with patient/caregiver? Yes   Delivery address Home   Medication(s) being filled and delivered Sofosbuvir-Velpatasvir   Copay verified? Yes   Copay amount 0.00   Copay form of payment No copayment ($0)   Delivery Date Selection 06/04/25   Signature Required Yes   Do you consent to receive electronic handouts?  Yes                   Follow-up: 3 weeks     Edgardo Zabala RPH  Specialty Pharmacist  6/3/2025  13:20 EDT

## 2025-06-26 ENCOUNTER — SPECIALTY PHARMACY (OUTPATIENT)
Dept: PHARMACY | Facility: TELEHEALTH | Age: 45
End: 2025-06-26
Payer: COMMERCIAL

## 2025-06-26 NOTE — PROGRESS NOTES
Specialty Pharmacy Patient Management Program  Call Center Refill Outreach      Beverley is a 45 y.o. female contacted today regarding refills of her medication(s).    Specialty medication(s) and dose(s) confirmed: sofosbuvir-velpatasvir  Other medications being refilled: none    Refill Questions      Flowsheet Row Most Recent Value   Changes to allergies? No   Changes to medications? No   New conditions or infections since last clinic visit No   Unplanned office visit, urgent care, ED, or hospital admission in the last 4 weeks  No   How does patient/caregiver feel medication is working? Very good   Financial problems or insurance changes  No   Since the previous refill, were any specialty medication doses or scheduled injections missed or delayed?  No   Does this patient require a clinical escalation to a pharmacist? No            Delivery Questions      Flowsheet Row Most Recent Value   Delivery method UPS   Delivery address verified with patient/caregiver? Yes   Delivery address Home   Medication(s) being filled and delivered Sofosbuvir-Velpatasvir   Doses left of specialty medications aobut 10 days   Copay verified? Yes   Copay amount 0.00   Copay form of payment No copayment ($0)   Delivery Date Selection 06/27/25   Signature Required Yes   Do you consent to receive electronic handouts?  No                   Follow-up: 3 weeks     Edgardo aZbala RPH  Specialty Pharmacist  6/26/2025  16:12 EDT

## 2025-06-26 NOTE — PROGRESS NOTES
Specialty Pharmacy Patient Management Program  Reassessment     Beverley Tovar is a 45 y.o. female with hep c and enrolled in the Patient Management program offered by [facility]. A follow-up outreach was conducted, including assessment of continued therapy appropriateness, medication adherence, and side effect incidence and management for sofosbuvir-velpatasvir.     Changes to Insurance Coverage or Financial Support  none    Relevant Past Medical History and Comorbidities  Relevant medical history and concomitant health conditions were discussed with the patient. The patient's chart has been reviewed for relevant past medical history and comorbid health conditions and updated as necessary.   Past Medical History:   Diagnosis Date    Anxiety     Cholelithiasis     Depression     Hepatitis C     History of alcohol abuse     History of drug abuse     Hypertension     Liver disease     Low back pain     Obesity      Social History     Socioeconomic History    Marital status: Single   Tobacco Use    Smoking status: Every Day     Current packs/day: 0.50     Average packs/day: 0.5 packs/day for 25.0 years (12.5 ttl pk-yrs)     Types: Cigarettes    Smokeless tobacco: Never   Vaping Use    Vaping status: Former    Start date: 6/14/2022    Quit date: 1/1/2024    Substances: Nicotine, Flavoring   Substance and Sexual Activity    Alcohol use: Not Currently    Drug use: Not Currently     Types: Hydrocodone, Marijuana, Oxycodone     Comment: last used 2018    Sexual activity: Yes     Partners: Male     Birth control/protection: Tubal ligation     Problem list reviewed by Edgardo Zabala RPH on 6/26/2025 at  4:11 PM    Allergies  Known allergies and reactions were discussed with the patient. The patient's chart has been reviewed for allergy information and updated as necessary.   No Known Allergies  Allergies reviewed by Edgardo Zabala RPH on 6/26/2025 at  4:11 PM    Relevant Laboratory  Values  Lab Results   Component Value Date    GLUCOSE 91 03/06/2025    CALCIUM 9.2 03/06/2025     03/06/2025    K 4.2 03/06/2025    CO2 25.4 03/06/2025     03/06/2025    BUN 10 03/06/2025    CREATININE 0.70 03/06/2025    BCR 14.3 03/06/2025    ANIONGAP 7.6 03/06/2025     Lab Results   Component Value Date    WBC 9.39 03/06/2025    HGB 15.0 03/06/2025    HCT 44.7 03/06/2025    MCV 93.1 03/06/2025     03/06/2025     Lab Value Review  The above lab values have been reviewed; the following specialty medication(s) dose adjustment(s) are recommended: none.    Current Medication List  This medication list has been reviewed with the patient and evaluated for any interactions or necessary modifications/recommendations, and updated to include all prescription medications, OTC medications, and supplements the patient is currently taking. This list reflects what is contained in the patient's profile, which has also been marked as reviewed to communicate to other providers it is the most up to date version of the patient's current medication therapy.     Current Outpatient Medications:     buprenorphine-naloxone (SUBOXONE) 8-2 MG per SL tablet, Place 1.5 tablets under the tongue Daily., Disp: , Rfl:     escitalopram (Lexapro) 20 MG tablet, Take 1 tablet by mouth Daily for 90 days., Disp: 90 tablet, Rfl: 3    metoprolol succinate XL (TOPROL-XL) 25 MG 24 hr tablet, Take 1 tablet by mouth Daily for 90 days., Disp: 90 tablet, Rfl: 3    Multiple Vitamins-Iron (MULTIVITAMIN/IRON PO), Take 1 tablet by mouth Daily., Disp: , Rfl:     Sofosbuvir-Velpatasvir (Epclusa) 400-100 MG tablet, Take 1 tablet by mouth Daily., Disp: 28 tablet, Rfl: 2    Tab-A-Christopher/Iron/Beta Carotene tablet tablet, Take 1 tablet by mouth Daily., Disp: , Rfl:   Medicines reviewed by Edgardo Zabala RPH on 6/26/2025 at  4:11 PM    Drug Interactions  none     Adverse Drug Reactions  Medication tolerability: Tolerating with no to minimal  ADRs  Medication plan: Continue therapy with normal follow-up  Plan for ADR Management: none    Hospitalizations and Urgent Care Since Last Assessment  Hospitalizations or Admissions: none  ED Visits: none  Urgent Office Visits: none     Adherence, Self-Administration, and Current Therapy Problems  Adherence related to the patient's specialty therapy was discussed with the patient. The Adherence segment of this outreach has been reviewed and updated.     Adherence Questions  Linked Medication(s) Assessed: Sofosbuvir-Velpatasvir  On average, how many doses/injections does the patient miss per month?: 0  What are the identified reasons for non-adherence or missed doses? : no problems identified  What is the estimated medication adherence level?: %  Based on the patient/caregiver response and refill history, does this patient require an MTP to track adherence improvements?: no    Additional Barriers to Patient Self-Administration: none  Methods for Supporting Patient Self-Administration: none    Open Medication Therapy Problems  No medication therapy recommendations to display    Goals of Therapy  Goals related to the patient's specialty therapy were discussed with the patient. The Patient Goals segment of this outreach has been reviewed and updated.   Goals Addressed Today        Specialty Pharmacy General Goal      Prevent fibrosis progression when patient misses <5 % of doses.  Successful sustained virologic response 12 weeks post-treatment (SVR12).    03/11/25:                Progress Toward Meeting Patient-Identified Goals of Therapy: On Track  New Patient-Identified Goals, If Applicable:     Progress Toward Meeting Clinical Goals or Therapeutic Targets: On Track  New Clinical Goals or Therapeutic Targets, If Applicable:     Quality of Life Assessment   Quality of Life related to the patient's enrollment in the patient management program and services provided was discussed with the patient. The QOL segment  of this outreach has been reviewed and updated.  Quality of Life Improvement Scale: 9-A good deal better    Reassessment Plan & Follow-Up  Medication Therapy Changes: none  Additional Plans, Therapy Recommendations, or Therapy Problems to Be Addressed: none   Pharmacist to perform regular reassessments no more than (6) months from the previous assessment.  Care Coordinator to set up future refill outreaches, coordinate prescription delivery, and escalate clinical questions to pharmacist.     Attestation  I attest the patient was actively involved in and has agreed to the above plan of care. I attest that the specialty medication(s) addressed above are appropriate for the patient based on my reassessment. If the prescribed therapy is at any point deemed not appropriate based on the current or future assessments, a consultation will be initiated with the patient's specialty care provider to determine the best course of action. The revised plan of therapy will be documented along with any required assessments and/or additional patient education provided.     Electronically signed by Edgardo Zabala RPH, 06/26/25, 4:12 PM EDT.

## 2025-08-12 ENCOUNTER — OFFICE VISIT (OUTPATIENT)
Dept: CARDIOLOGY | Facility: CLINIC | Age: 45
End: 2025-08-12
Payer: COMMERCIAL

## 2025-08-12 VITALS
DIASTOLIC BLOOD PRESSURE: 60 MMHG | HEART RATE: 72 BPM | HEIGHT: 70 IN | BODY MASS INDEX: 41.95 KG/M2 | WEIGHT: 293 LBS | SYSTOLIC BLOOD PRESSURE: 126 MMHG

## 2025-08-12 DIAGNOSIS — K21.9 GASTROESOPHAGEAL REFLUX DISEASE WITHOUT ESOPHAGITIS: ICD-10-CM

## 2025-08-12 DIAGNOSIS — F17.210 CIGARETTE NICOTINE DEPENDENCE WITHOUT COMPLICATION: ICD-10-CM

## 2025-08-12 DIAGNOSIS — E66.813 CLASS 3 SEVERE OBESITY WITH SERIOUS COMORBIDITY AND BODY MASS INDEX (BMI) OF 40.0 TO 44.9 IN ADULT: ICD-10-CM

## 2025-08-12 DIAGNOSIS — I10 PRIMARY HYPERTENSION: Primary | ICD-10-CM

## 2025-08-12 PROCEDURE — 3078F DIAST BP <80 MM HG: CPT | Performed by: NURSE PRACTITIONER

## 2025-08-12 PROCEDURE — 1160F RVW MEDS BY RX/DR IN RCRD: CPT | Performed by: NURSE PRACTITIONER

## 2025-08-12 PROCEDURE — 1159F MED LIST DOCD IN RCRD: CPT | Performed by: NURSE PRACTITIONER

## 2025-08-12 PROCEDURE — 99214 OFFICE O/P EST MOD 30 MIN: CPT | Performed by: NURSE PRACTITIONER

## 2025-08-12 PROCEDURE — 3074F SYST BP LT 130 MM HG: CPT | Performed by: NURSE PRACTITIONER

## 2025-08-12 RX ORDER — METOPROLOL SUCCINATE 25 MG/1
25 TABLET, EXTENDED RELEASE ORAL DAILY
COMMUNITY
End: 2025-08-12 | Stop reason: SDUPTHER

## 2025-08-12 RX ORDER — METOPROLOL SUCCINATE 25 MG/1
25 TABLET, EXTENDED RELEASE ORAL DAILY
Qty: 90 TABLET | Refills: 4 | Status: SHIPPED | OUTPATIENT
Start: 2025-08-12

## (undated) DEVICE — GLV SURG BIOGEL LTX PF 8

## (undated) DEVICE — OCCLUSIVE GAUZE STRIP,3% BISMUTH TRIBROMOPHENATE IN PETROLATUM BLEND: Brand: XEROFORM

## (undated) DEVICE — BANDAGE,GAUZE,BULKEE II,4.5"X4.1YD,STRL: Brand: MEDLINE

## (undated) DEVICE — SPNG LAP 18X18IN LF STRL PK/5

## (undated) DEVICE — SOL ISO/ALC RUB 70PCT 4OZ

## (undated) DEVICE — ANTIBACTERIAL UNDYED BRAIDED (POLYGLACTIN 910), SYNTHETIC ABSORBABLE SUTURE: Brand: COATED VICRYL

## (undated) DEVICE — SUT VIC 0 CT1 36IN J946H

## (undated) DEVICE — BNDG ESMARK STRL 6INX12FT LF

## (undated) DEVICE — 3M™ IOBAN™ 2 ANTIMICROBIAL INCISE DRAPE 6650EZ: Brand: IOBAN™ 2

## (undated) DEVICE — Device

## (undated) DEVICE — DRAPE,U/ SHT,SPLIT,PLAS,STERIL: Brand: MEDLINE

## (undated) DEVICE — UNDERCAST PADDING: Brand: DEROYAL

## (undated) DEVICE — BIT DRL STD 2.7MM

## (undated) DEVICE — BNDG ELAS ELITE V/CLOSE 4IN 5YD LF STRL

## (undated) DEVICE — GLV SURG BIOGEL LTX PF 8 1/2

## (undated) DEVICE — PREP SOL POVIDONE/IODINE BT 4OZ

## (undated) DEVICE — PK ORTHO MINOR 40

## (undated) DEVICE — APPL CHLORAPREP W/TINT 26ML ORNG

## (undated) DEVICE — TRY SKINPREP DRYPREP

## (undated) DEVICE — GLV SURG SENSICARE W/ALOE PF LF 8 STRL

## (undated) DEVICE — DRAPE,REIN 53X77,STERILE: Brand: MEDLINE

## (undated) DEVICE — TP CAST SCOTCHCAST PLS 4IN 4YD BLU

## (undated) DEVICE — DRSNG WND GZ CURAD OIL EMULSION 3X8IN LF STRL 1PK

## (undated) DEVICE — DISPOSABLE TOURNIQUET CUFF SINGLE BLADDER, SINGLE PORT AND QUICK CONNECT CONNECTOR: Brand: COLOR CUFF

## (undated) DEVICE — BIT DRL QC SS 2.5X110MM

## (undated) DEVICE — SUT MNCRYL 3/0 PS2 18IN MCP497G

## (undated) DEVICE — DRP C/ARM 41X74IN

## (undated) DEVICE — GUIDEPIN PT 1.6MM 6IN

## (undated) DEVICE — TP CAST SCOTCHCAST PLS 3IN 4YD RED

## (undated) DEVICE — SPNG GZ WOVN 4X4IN 12PLY 10/BX STRL

## (undated) DEVICE — PAD,ABDOMINAL,8"X10",ST,LF: Brand: MEDLINE